# Patient Record
Sex: MALE | Race: WHITE | Employment: OTHER | URBAN - METROPOLITAN AREA
[De-identification: names, ages, dates, MRNs, and addresses within clinical notes are randomized per-mention and may not be internally consistent; named-entity substitution may affect disease eponyms.]

---

## 2017-01-03 RX ORDER — TRAMADOL HYDROCHLORIDE 50 MG/1
TABLET ORAL
Qty: 120 TABLET | Refills: 1 | Status: SHIPPED | OUTPATIENT
Start: 2017-01-03 | End: 2017-03-02 | Stop reason: SDUPTHER

## 2017-01-03 RX ORDER — ISOSORBIDE DINITRATE 10 MG/1
TABLET ORAL
Qty: 93 TABLET | Refills: 1 | Status: SHIPPED | OUTPATIENT
Start: 2017-01-03 | End: 2017-02-28 | Stop reason: SDUPTHER

## 2017-02-01 RX ORDER — TRIAMTERENE AND HYDROCHLOROTHIAZIDE 37.5; 25 MG/1; MG/1
CAPSULE ORAL
Qty: 31 CAPSULE | Refills: 0 | Status: SHIPPED | OUTPATIENT
Start: 2017-02-01 | End: 2017-04-04 | Stop reason: SDUPTHER

## 2017-02-01 RX ORDER — ROPINIROLE 1 MG/1
TABLET, FILM COATED ORAL
Qty: 31 TABLET | Refills: 0 | Status: SHIPPED | OUTPATIENT
Start: 2017-02-01 | End: 2017-04-04 | Stop reason: SDUPTHER

## 2017-02-01 RX ORDER — ATORVASTATIN CALCIUM 10 MG/1
TABLET, FILM COATED ORAL
Qty: 31 TABLET | Refills: 0 | Status: SHIPPED | OUTPATIENT
Start: 2017-02-01 | End: 2017-04-04 | Stop reason: SDUPTHER

## 2017-02-01 RX ORDER — GLIMEPIRIDE 2 MG/1
TABLET ORAL
Qty: 62 TABLET | Refills: 0 | Status: SHIPPED | OUTPATIENT
Start: 2017-02-01 | End: 2017-04-04 | Stop reason: SDUPTHER

## 2017-02-13 ENCOUNTER — OFFICE VISIT (OUTPATIENT)
Dept: INTERNAL MEDICINE CLINIC | Age: 82
End: 2017-02-13

## 2017-02-13 VITALS
DIASTOLIC BLOOD PRESSURE: 80 MMHG | RESPIRATION RATE: 14 BRPM | SYSTOLIC BLOOD PRESSURE: 120 MMHG | BODY MASS INDEX: 27.97 KG/M2 | HEART RATE: 70 BPM | HEIGHT: 66 IN | WEIGHT: 174 LBS

## 2017-02-13 DIAGNOSIS — E11.9 TYPE 2 DIABETES MELLITUS WITHOUT COMPLICATION, WITHOUT LONG-TERM CURRENT USE OF INSULIN (HCC): Primary | ICD-10-CM

## 2017-02-13 DIAGNOSIS — I25.10 CORONARY ARTERY DISEASE INVOLVING NATIVE HEART WITHOUT ANGINA PECTORIS, UNSPECIFIED VESSEL OR LESION TYPE: ICD-10-CM

## 2017-02-13 DIAGNOSIS — E11.69 HYPERLIPIDEMIA ASSOCIATED WITH TYPE 2 DIABETES MELLITUS (HCC): ICD-10-CM

## 2017-02-13 DIAGNOSIS — E78.5 HYPERLIPIDEMIA ASSOCIATED WITH TYPE 2 DIABETES MELLITUS (HCC): ICD-10-CM

## 2017-02-13 DIAGNOSIS — I10 ESSENTIAL HYPERTENSION: ICD-10-CM

## 2017-02-13 DIAGNOSIS — M15.9 PRIMARY OSTEOARTHRITIS INVOLVING MULTIPLE JOINTS: ICD-10-CM

## 2017-02-13 DIAGNOSIS — E11.9 CONTROLLED TYPE 2 DIABETES MELLITUS WITHOUT COMPLICATION, WITHOUT LONG-TERM CURRENT USE OF INSULIN (HCC): ICD-10-CM

## 2017-02-13 DIAGNOSIS — G25.81 RESTLESS LEGS SYNDROME: ICD-10-CM

## 2017-02-13 DIAGNOSIS — E11.9 TYPE 2 DIABETES MELLITUS WITHOUT COMPLICATION, WITHOUT LONG-TERM CURRENT USE OF INSULIN (HCC): ICD-10-CM

## 2017-02-13 DIAGNOSIS — N18.30 CHRONIC KIDNEY DISEASE (CKD), STAGE III (MODERATE) (HCC): ICD-10-CM

## 2017-02-13 DIAGNOSIS — N19 RENAL FAILURE: ICD-10-CM

## 2017-02-13 PROCEDURE — 99214 OFFICE O/P EST MOD 30 MIN: CPT | Performed by: INTERNAL MEDICINE

## 2017-02-13 PROCEDURE — 1036F TOBACCO NON-USER: CPT | Performed by: INTERNAL MEDICINE

## 2017-02-13 PROCEDURE — G8420 CALC BMI NORM PARAMETERS: HCPCS | Performed by: INTERNAL MEDICINE

## 2017-02-13 PROCEDURE — 1123F ACP DISCUSS/DSCN MKR DOCD: CPT | Performed by: INTERNAL MEDICINE

## 2017-02-13 PROCEDURE — G8484 FLU IMMUNIZE NO ADMIN: HCPCS | Performed by: INTERNAL MEDICINE

## 2017-02-13 PROCEDURE — G8427 DOCREV CUR MEDS BY ELIG CLIN: HCPCS | Performed by: INTERNAL MEDICINE

## 2017-02-13 PROCEDURE — G8599 NO ASA/ANTIPLAT THER USE RNG: HCPCS | Performed by: INTERNAL MEDICINE

## 2017-02-13 PROCEDURE — 4040F PNEUMOC VAC/ADMIN/RCVD: CPT | Performed by: INTERNAL MEDICINE

## 2017-02-13 ASSESSMENT — ENCOUNTER SYMPTOMS
NAUSEA: 0
SHORTNESS OF BREATH: 0
VOMITING: 0
RHINORRHEA: 0
WHEEZING: 0
ABDOMINAL PAIN: 0
BACK PAIN: 0

## 2017-02-14 LAB
A/G RATIO: 1.6 (ref 1.1–2.2)
ALBUMIN SERPL-MCNC: 3.9 G/DL (ref 3.4–5)
ALP BLD-CCNC: 103 U/L (ref 40–129)
ALT SERPL-CCNC: 13 U/L (ref 10–40)
ANION GAP SERPL CALCULATED.3IONS-SCNC: 15 MMOL/L (ref 3–16)
AST SERPL-CCNC: 26 U/L (ref 15–37)
BASOPHILS ABSOLUTE: 0 K/UL (ref 0–0.2)
BASOPHILS RELATIVE PERCENT: 0.5 %
BILIRUB SERPL-MCNC: 0.5 MG/DL (ref 0–1)
BUN BLDV-MCNC: 25 MG/DL (ref 7–20)
CALCIUM SERPL-MCNC: 8.9 MG/DL (ref 8.3–10.6)
CHLORIDE BLD-SCNC: 103 MMOL/L (ref 99–110)
CHOLESTEROL, TOTAL: 117 MG/DL (ref 0–199)
CO2: 26 MMOL/L (ref 21–32)
CREAT SERPL-MCNC: 1.5 MG/DL (ref 0.8–1.3)
EOSINOPHILS ABSOLUTE: 0.1 K/UL (ref 0–0.6)
EOSINOPHILS RELATIVE PERCENT: 0.8 %
ESTIMATED AVERAGE GLUCOSE: 145.6 MG/DL
GFR AFRICAN AMERICAN: 53
GFR NON-AFRICAN AMERICAN: 44
GLOBULIN: 2.5 G/DL
GLUCOSE BLD-MCNC: 99 MG/DL (ref 70–99)
HBA1C MFR BLD: 6.7 %
HCT VFR BLD CALC: 47 % (ref 40.5–52.5)
HDLC SERPL-MCNC: 34 MG/DL (ref 40–60)
HEMOGLOBIN: 15.5 G/DL (ref 13.5–17.5)
LDL CHOLESTEROL CALCULATED: 64 MG/DL
LYMPHOCYTES ABSOLUTE: 1.3 K/UL (ref 1–5.1)
LYMPHOCYTES RELATIVE PERCENT: 20.3 %
MCH RBC QN AUTO: 31.1 PG (ref 26–34)
MCHC RBC AUTO-ENTMCNC: 33.1 G/DL (ref 31–36)
MCV RBC AUTO: 94 FL (ref 80–100)
MONOCYTES ABSOLUTE: 0.6 K/UL (ref 0–1.3)
MONOCYTES RELATIVE PERCENT: 10 %
NEUTROPHILS ABSOLUTE: 4.2 K/UL (ref 1.7–7.7)
NEUTROPHILS RELATIVE PERCENT: 68.4 %
PDW BLD-RTO: 13.4 % (ref 12.4–15.4)
PLATELET # BLD: 199 K/UL (ref 135–450)
PMV BLD AUTO: 8.1 FL (ref 5–10.5)
POTASSIUM SERPL-SCNC: 4.3 MMOL/L (ref 3.5–5.1)
RBC # BLD: 4.99 M/UL (ref 4.2–5.9)
SODIUM BLD-SCNC: 144 MMOL/L (ref 136–145)
TOTAL PROTEIN: 6.4 G/DL (ref 6.4–8.2)
TRIGL SERPL-MCNC: 95 MG/DL (ref 0–150)
VLDLC SERPL CALC-MCNC: 19 MG/DL
WBC # BLD: 6.2 K/UL (ref 4–11)

## 2017-03-01 RX ORDER — OLOPATADINE HYDROCHLORIDE 1 MG/ML
SOLUTION/ DROPS OPHTHALMIC
Qty: 5 ML | Refills: 0 | Status: SHIPPED | OUTPATIENT
Start: 2017-03-01 | End: 2019-08-13

## 2017-03-01 RX ORDER — HYDRALAZINE HYDROCHLORIDE 25 MG/1
TABLET, FILM COATED ORAL
Qty: 93 TABLET | Refills: 0 | Status: SHIPPED | OUTPATIENT
Start: 2017-03-01 | End: 2017-05-03 | Stop reason: SDUPTHER

## 2017-03-01 RX ORDER — ATENOLOL 50 MG/1
TABLET ORAL
Qty: 31 TABLET | Refills: 2 | Status: SHIPPED | OUTPATIENT
Start: 2017-03-01 | End: 2017-07-03 | Stop reason: SDUPTHER

## 2017-03-01 RX ORDER — ISOSORBIDE DINITRATE 10 MG/1
TABLET ORAL
Qty: 93 TABLET | Refills: 0 | Status: SHIPPED | OUTPATIENT
Start: 2017-03-01 | End: 2017-04-04 | Stop reason: SDUPTHER

## 2017-03-02 RX ORDER — TRAMADOL HYDROCHLORIDE 50 MG/1
TABLET ORAL
Qty: 120 TABLET | Refills: 2 | Status: SHIPPED | OUTPATIENT
Start: 2017-03-02 | End: 2017-07-27 | Stop reason: SDUPTHER

## 2017-04-04 RX ORDER — ISOSORBIDE DINITRATE 10 MG/1
TABLET ORAL
Qty: 93 TABLET | Refills: 0 | Status: SHIPPED | OUTPATIENT
Start: 2017-04-04 | End: 2017-06-02 | Stop reason: SDUPTHER

## 2017-04-04 RX ORDER — ATORVASTATIN CALCIUM 10 MG/1
TABLET, FILM COATED ORAL
Qty: 31 TABLET | Refills: 0 | Status: SHIPPED | OUTPATIENT
Start: 2017-04-04 | End: 2017-06-02 | Stop reason: SDUPTHER

## 2017-04-04 RX ORDER — GLIMEPIRIDE 2 MG/1
TABLET ORAL
Qty: 62 TABLET | Refills: 0 | Status: SHIPPED | OUTPATIENT
Start: 2017-04-04 | End: 2017-06-02 | Stop reason: SDUPTHER

## 2017-04-04 RX ORDER — TRIAMTERENE AND HYDROCHLOROTHIAZIDE 37.5; 25 MG/1; MG/1
CAPSULE ORAL
Qty: 31 CAPSULE | Refills: 0 | Status: SHIPPED | OUTPATIENT
Start: 2017-04-04 | End: 2017-06-02 | Stop reason: SDUPTHER

## 2017-04-04 RX ORDER — ROPINIROLE 1 MG/1
TABLET, FILM COATED ORAL
Qty: 31 TABLET | Refills: 0 | Status: SHIPPED | OUTPATIENT
Start: 2017-04-04 | End: 2017-06-02 | Stop reason: SDUPTHER

## 2017-05-04 RX ORDER — HYDRALAZINE HYDROCHLORIDE 25 MG/1
TABLET, FILM COATED ORAL
Qty: 93 TABLET | Refills: 1 | Status: SHIPPED | OUTPATIENT
Start: 2017-05-04 | End: 2017-07-31 | Stop reason: SDUPTHER

## 2017-05-17 ENCOUNTER — OFFICE VISIT (OUTPATIENT)
Dept: INTERNAL MEDICINE CLINIC | Age: 82
End: 2017-05-17

## 2017-05-17 VITALS
SYSTOLIC BLOOD PRESSURE: 130 MMHG | HEIGHT: 65 IN | BODY MASS INDEX: 28.32 KG/M2 | WEIGHT: 170 LBS | RESPIRATION RATE: 14 BRPM | DIASTOLIC BLOOD PRESSURE: 80 MMHG | HEART RATE: 70 BPM

## 2017-05-17 DIAGNOSIS — E78.5 HYPERLIPIDEMIA ASSOCIATED WITH TYPE 2 DIABETES MELLITUS (HCC): ICD-10-CM

## 2017-05-17 DIAGNOSIS — Z23 NEED FOR PNEUMOCOCCAL VACCINATION: ICD-10-CM

## 2017-05-17 DIAGNOSIS — G25.81 RESTLESS LEGS SYNDROME: ICD-10-CM

## 2017-05-17 DIAGNOSIS — M15.9 PRIMARY OSTEOARTHRITIS INVOLVING MULTIPLE JOINTS: ICD-10-CM

## 2017-05-17 DIAGNOSIS — E11.9 TYPE 2 DIABETES MELLITUS WITHOUT COMPLICATION, WITHOUT LONG-TERM CURRENT USE OF INSULIN (HCC): Primary | ICD-10-CM

## 2017-05-17 DIAGNOSIS — I10 ESSENTIAL HYPERTENSION: ICD-10-CM

## 2017-05-17 DIAGNOSIS — I25.10 CORONARY ARTERY DISEASE INVOLVING NATIVE HEART WITHOUT ANGINA PECTORIS, UNSPECIFIED VESSEL OR LESION TYPE: ICD-10-CM

## 2017-05-17 DIAGNOSIS — N18.30 CHRONIC KIDNEY DISEASE (CKD), STAGE III (MODERATE) (HCC): ICD-10-CM

## 2017-05-17 DIAGNOSIS — E11.69 HYPERLIPIDEMIA ASSOCIATED WITH TYPE 2 DIABETES MELLITUS (HCC): ICD-10-CM

## 2017-05-17 LAB
BILIRUBIN, POC: NORMAL
BLOOD URINE, POC: NORMAL
CLARITY, POC: NORMAL
COLOR, POC: NORMAL
CREATININE URINE: 164.5 MG/DL (ref 39–259)
GLUCOSE URINE, POC: NORMAL
KETONES, POC: NORMAL
LEUKOCYTE EST, POC: NORMAL
MICROALBUMIN UR-MCNC: <1.2 MG/DL
MICROALBUMIN/CREAT UR-RTO: NORMAL MG/G (ref 0–30)
NITRITE, POC: NORMAL
PH, POC: NORMAL
PROTEIN, POC: NORMAL
SPECIFIC GRAVITY, POC: NORMAL
UROBILINOGEN, POC: NORMAL

## 2017-05-17 PROCEDURE — G8599 NO ASA/ANTIPLAT THER USE RNG: HCPCS | Performed by: INTERNAL MEDICINE

## 2017-05-17 PROCEDURE — G0009 ADMIN PNEUMOCOCCAL VACCINE: HCPCS | Performed by: INTERNAL MEDICINE

## 2017-05-17 PROCEDURE — 1036F TOBACCO NON-USER: CPT | Performed by: INTERNAL MEDICINE

## 2017-05-17 PROCEDURE — 99214 OFFICE O/P EST MOD 30 MIN: CPT | Performed by: INTERNAL MEDICINE

## 2017-05-17 PROCEDURE — G8427 DOCREV CUR MEDS BY ELIG CLIN: HCPCS | Performed by: INTERNAL MEDICINE

## 2017-05-17 PROCEDURE — 90732 PPSV23 VACC 2 YRS+ SUBQ/IM: CPT | Performed by: INTERNAL MEDICINE

## 2017-05-17 PROCEDURE — 4040F PNEUMOC VAC/ADMIN/RCVD: CPT | Performed by: INTERNAL MEDICINE

## 2017-05-17 PROCEDURE — 1123F ACP DISCUSS/DSCN MKR DOCD: CPT | Performed by: INTERNAL MEDICINE

## 2017-05-17 PROCEDURE — G8420 CALC BMI NORM PARAMETERS: HCPCS | Performed by: INTERNAL MEDICINE

## 2017-05-17 PROCEDURE — 81002 URINALYSIS NONAUTO W/O SCOPE: CPT | Performed by: INTERNAL MEDICINE

## 2017-05-17 RX ORDER — DEXTROMETHORPHAN HYDROBROMIDE AND PROMETHAZINE HYDROCHLORIDE 15; 6.25 MG/5ML; MG/5ML
5 SYRUP ORAL 4 TIMES DAILY PRN
Qty: 200 ML | Refills: 0 | Status: SHIPPED | OUTPATIENT
Start: 2017-05-17 | End: 2017-10-19 | Stop reason: SDUPTHER

## 2017-05-17 ASSESSMENT — ENCOUNTER SYMPTOMS
WHEEZING: 0
NAUSEA: 0
ABDOMINAL PAIN: 0
VOMITING: 0
RHINORRHEA: 0
BACK PAIN: 0
SHORTNESS OF BREATH: 0

## 2017-05-25 ENCOUNTER — TELEPHONE (OUTPATIENT)
Dept: ORTHOPEDIC SURGERY | Age: 82
End: 2017-05-25

## 2017-05-25 ENCOUNTER — OFFICE VISIT (OUTPATIENT)
Dept: ORTHOPEDIC SURGERY | Age: 82
End: 2017-05-25

## 2017-05-25 VITALS — HEIGHT: 65 IN | BODY MASS INDEX: 28.32 KG/M2 | WEIGHT: 169.97 LBS

## 2017-05-25 DIAGNOSIS — M25.561 CHRONIC PAIN OF RIGHT KNEE: ICD-10-CM

## 2017-05-25 DIAGNOSIS — G89.29 CHRONIC PAIN OF LEFT KNEE: ICD-10-CM

## 2017-05-25 DIAGNOSIS — M25.562 CHRONIC PAIN OF LEFT KNEE: ICD-10-CM

## 2017-05-25 DIAGNOSIS — G89.29 CHRONIC PAIN OF RIGHT KNEE: ICD-10-CM

## 2017-05-25 DIAGNOSIS — M17.0 PRIMARY OSTEOARTHRITIS OF BOTH KNEES: Primary | ICD-10-CM

## 2017-05-25 PROCEDURE — 20610 DRAIN/INJ JOINT/BURSA W/O US: CPT | Performed by: ORTHOPAEDIC SURGERY

## 2017-05-25 PROCEDURE — 99203 OFFICE O/P NEW LOW 30 MIN: CPT | Performed by: ORTHOPAEDIC SURGERY

## 2017-06-02 RX ORDER — ATORVASTATIN CALCIUM 10 MG/1
TABLET, FILM COATED ORAL
Qty: 31 TABLET | Refills: 0 | Status: SHIPPED | OUTPATIENT
Start: 2017-06-02 | End: 2017-06-02 | Stop reason: SDUPTHER

## 2017-06-02 RX ORDER — ROPINIROLE 1 MG/1
TABLET, FILM COATED ORAL
Qty: 31 TABLET | Refills: 0 | Status: SHIPPED | OUTPATIENT
Start: 2017-06-02 | End: 2017-06-02 | Stop reason: SDUPTHER

## 2017-06-02 RX ORDER — GLIMEPIRIDE 2 MG/1
TABLET ORAL
Qty: 62 TABLET | Refills: 0 | Status: SHIPPED | OUTPATIENT
Start: 2017-06-02 | End: 2017-07-31 | Stop reason: SDUPTHER

## 2017-06-02 RX ORDER — ATORVASTATIN CALCIUM 10 MG/1
TABLET, FILM COATED ORAL
Qty: 31 TABLET | Refills: 0 | Status: SHIPPED | OUTPATIENT
Start: 2017-06-02 | End: 2017-07-31 | Stop reason: SDUPTHER

## 2017-06-02 RX ORDER — TRIAMTERENE AND HYDROCHLOROTHIAZIDE 37.5; 25 MG/1; MG/1
CAPSULE ORAL
Qty: 31 CAPSULE | Refills: 0 | Status: SHIPPED | OUTPATIENT
Start: 2017-06-02 | End: 2017-06-02 | Stop reason: SDUPTHER

## 2017-06-02 RX ORDER — ROPINIROLE 1 MG/1
TABLET, FILM COATED ORAL
Qty: 31 TABLET | Refills: 0 | Status: SHIPPED | OUTPATIENT
Start: 2017-06-02 | End: 2017-10-13 | Stop reason: SDUPTHER

## 2017-06-02 RX ORDER — ISOSORBIDE DINITRATE 10 MG/1
TABLET ORAL
Qty: 93 TABLET | Refills: 0 | Status: SHIPPED | OUTPATIENT
Start: 2017-06-02 | End: 2017-07-31 | Stop reason: SDUPTHER

## 2017-06-02 RX ORDER — TRIAMTERENE AND HYDROCHLOROTHIAZIDE 37.5; 25 MG/1; MG/1
CAPSULE ORAL
Qty: 31 CAPSULE | Refills: 0 | Status: SHIPPED | OUTPATIENT
Start: 2017-06-02 | End: 2017-09-01 | Stop reason: SDUPTHER

## 2017-06-08 ENCOUNTER — OFFICE VISIT (OUTPATIENT)
Dept: ORTHOPEDIC SURGERY | Age: 82
End: 2017-06-08

## 2017-06-08 DIAGNOSIS — M17.0 PRIMARY OSTEOARTHRITIS OF BOTH KNEES: Primary | ICD-10-CM

## 2017-06-08 PROCEDURE — 1036F TOBACCO NON-USER: CPT | Performed by: ORTHOPAEDIC SURGERY

## 2017-06-08 PROCEDURE — 20610 DRAIN/INJ JOINT/BURSA W/O US: CPT | Performed by: ORTHOPAEDIC SURGERY

## 2017-07-05 RX ORDER — ATENOLOL 50 MG/1
TABLET ORAL
Qty: 31 TABLET | Refills: 2 | Status: SHIPPED | OUTPATIENT
Start: 2017-07-05 | End: 2017-09-01 | Stop reason: SDUPTHER

## 2017-07-27 RX ORDER — TRAMADOL HYDROCHLORIDE 50 MG/1
TABLET ORAL
Qty: 120 TABLET | Refills: 2 | Status: SHIPPED | OUTPATIENT
Start: 2017-07-27 | End: 2018-01-10 | Stop reason: SDUPTHER

## 2017-08-01 RX ORDER — ATORVASTATIN CALCIUM 10 MG/1
TABLET, FILM COATED ORAL
Qty: 31 TABLET | Refills: 0 | Status: SHIPPED | OUTPATIENT
Start: 2017-08-01 | End: 2017-10-02 | Stop reason: SDUPTHER

## 2017-08-01 RX ORDER — ISOSORBIDE DINITRATE 10 MG/1
TABLET ORAL
Qty: 93 TABLET | Refills: 0 | Status: SHIPPED | OUTPATIENT
Start: 2017-08-01 | End: 2017-10-02 | Stop reason: SDUPTHER

## 2017-08-01 RX ORDER — HYDRALAZINE HYDROCHLORIDE 25 MG/1
TABLET, FILM COATED ORAL
Qty: 93 TABLET | Refills: 0 | Status: SHIPPED | OUTPATIENT
Start: 2017-08-01 | End: 2017-10-02 | Stop reason: SDUPTHER

## 2017-08-01 RX ORDER — GLIMEPIRIDE 2 MG/1
TABLET ORAL
Qty: 62 TABLET | Refills: 0 | Status: SHIPPED | OUTPATIENT
Start: 2017-08-01 | End: 2017-10-02 | Stop reason: SDUPTHER

## 2017-08-23 ENCOUNTER — OFFICE VISIT (OUTPATIENT)
Dept: INTERNAL MEDICINE CLINIC | Age: 82
End: 2017-08-23

## 2017-08-23 VITALS
SYSTOLIC BLOOD PRESSURE: 120 MMHG | HEIGHT: 65 IN | HEART RATE: 72 BPM | BODY MASS INDEX: 28.32 KG/M2 | DIASTOLIC BLOOD PRESSURE: 70 MMHG | WEIGHT: 170 LBS | RESPIRATION RATE: 14 BRPM

## 2017-08-23 DIAGNOSIS — N19 RENAL FAILURE: ICD-10-CM

## 2017-08-23 DIAGNOSIS — M15.9 PRIMARY OSTEOARTHRITIS INVOLVING MULTIPLE JOINTS: ICD-10-CM

## 2017-08-23 DIAGNOSIS — I25.10 CORONARY ARTERY DISEASE INVOLVING NATIVE HEART WITHOUT ANGINA PECTORIS, UNSPECIFIED VESSEL OR LESION TYPE: ICD-10-CM

## 2017-08-23 DIAGNOSIS — G25.81 RESTLESS LEGS SYNDROME: ICD-10-CM

## 2017-08-23 DIAGNOSIS — E11.9 CONTROLLED TYPE 2 DIABETES MELLITUS WITHOUT COMPLICATION, WITHOUT LONG-TERM CURRENT USE OF INSULIN (HCC): ICD-10-CM

## 2017-08-23 DIAGNOSIS — N18.30 CHRONIC KIDNEY DISEASE (CKD), STAGE III (MODERATE) (HCC): ICD-10-CM

## 2017-08-23 DIAGNOSIS — I10 ESSENTIAL HYPERTENSION: ICD-10-CM

## 2017-08-23 DIAGNOSIS — E78.5 HYPERLIPIDEMIA ASSOCIATED WITH TYPE 2 DIABETES MELLITUS (HCC): ICD-10-CM

## 2017-08-23 DIAGNOSIS — E11.9 CONTROLLED TYPE 2 DIABETES MELLITUS WITHOUT COMPLICATION, WITHOUT LONG-TERM CURRENT USE OF INSULIN (HCC): Primary | ICD-10-CM

## 2017-08-23 DIAGNOSIS — E11.69 HYPERLIPIDEMIA ASSOCIATED WITH TYPE 2 DIABETES MELLITUS (HCC): ICD-10-CM

## 2017-08-23 LAB
A/G RATIO: 1.4 (ref 1.1–2.2)
ALBUMIN SERPL-MCNC: 3.7 G/DL (ref 3.4–5)
ALP BLD-CCNC: 86 U/L (ref 40–129)
ALT SERPL-CCNC: 12 U/L (ref 10–40)
ANION GAP SERPL CALCULATED.3IONS-SCNC: 13 MMOL/L (ref 3–16)
AST SERPL-CCNC: 22 U/L (ref 15–37)
BASOPHILS ABSOLUTE: 0 K/UL (ref 0–0.2)
BASOPHILS RELATIVE PERCENT: 0.6 %
BILIRUB SERPL-MCNC: 0.7 MG/DL (ref 0–1)
BUN BLDV-MCNC: 23 MG/DL (ref 7–20)
CALCIUM SERPL-MCNC: 9.3 MG/DL (ref 8.3–10.6)
CHLORIDE BLD-SCNC: 99 MMOL/L (ref 99–110)
CO2: 25 MMOL/L (ref 21–32)
CREAT SERPL-MCNC: 1.3 MG/DL (ref 0.8–1.3)
EOSINOPHILS ABSOLUTE: 0.1 K/UL (ref 0–0.6)
EOSINOPHILS RELATIVE PERCENT: 1.4 %
GFR AFRICAN AMERICAN: >60
GFR NON-AFRICAN AMERICAN: 52
GLOBULIN: 2.6 G/DL
GLUCOSE BLD-MCNC: 90 MG/DL (ref 70–99)
HCT VFR BLD CALC: 46.2 % (ref 40.5–52.5)
HEMOGLOBIN: 15.3 G/DL (ref 13.5–17.5)
LYMPHOCYTES ABSOLUTE: 1.5 K/UL (ref 1–5.1)
LYMPHOCYTES RELATIVE PERCENT: 25.7 %
MCH RBC QN AUTO: 31.9 PG (ref 26–34)
MCHC RBC AUTO-ENTMCNC: 33.1 G/DL (ref 31–36)
MCV RBC AUTO: 96.4 FL (ref 80–100)
MONOCYTES ABSOLUTE: 0.7 K/UL (ref 0–1.3)
MONOCYTES RELATIVE PERCENT: 12.2 %
NEUTROPHILS ABSOLUTE: 3.6 K/UL (ref 1.7–7.7)
NEUTROPHILS RELATIVE PERCENT: 60.1 %
PDW BLD-RTO: 13.7 % (ref 12.4–15.4)
PLATELET # BLD: 192 K/UL (ref 135–450)
PMV BLD AUTO: 8.1 FL (ref 5–10.5)
POTASSIUM SERPL-SCNC: 4.3 MMOL/L (ref 3.5–5.1)
RBC # BLD: 4.8 M/UL (ref 4.2–5.9)
SODIUM BLD-SCNC: 137 MMOL/L (ref 136–145)
TOTAL PROTEIN: 6.3 G/DL (ref 6.4–8.2)
WBC # BLD: 6 K/UL (ref 4–11)

## 2017-08-23 PROCEDURE — G8419 CALC BMI OUT NRM PARAM NOF/U: HCPCS | Performed by: INTERNAL MEDICINE

## 2017-08-23 PROCEDURE — G8599 NO ASA/ANTIPLAT THER USE RNG: HCPCS | Performed by: INTERNAL MEDICINE

## 2017-08-23 PROCEDURE — 1036F TOBACCO NON-USER: CPT | Performed by: INTERNAL MEDICINE

## 2017-08-23 PROCEDURE — 4040F PNEUMOC VAC/ADMIN/RCVD: CPT | Performed by: INTERNAL MEDICINE

## 2017-08-23 PROCEDURE — 99214 OFFICE O/P EST MOD 30 MIN: CPT | Performed by: INTERNAL MEDICINE

## 2017-08-23 PROCEDURE — G8427 DOCREV CUR MEDS BY ELIG CLIN: HCPCS | Performed by: INTERNAL MEDICINE

## 2017-08-23 PROCEDURE — 1123F ACP DISCUSS/DSCN MKR DOCD: CPT | Performed by: INTERNAL MEDICINE

## 2017-08-23 ASSESSMENT — ENCOUNTER SYMPTOMS
RHINORRHEA: 0
BACK PAIN: 0
VOMITING: 0
WHEEZING: 0
ABDOMINAL PAIN: 0
SHORTNESS OF BREATH: 0
NAUSEA: 0

## 2017-08-24 LAB
ESTIMATED AVERAGE GLUCOSE: 134.1 MG/DL
HBA1C MFR BLD: 6.3 %

## 2017-09-05 RX ORDER — TRIAMTERENE AND HYDROCHLOROTHIAZIDE 37.5; 25 MG/1; MG/1
CAPSULE ORAL
Qty: 31 CAPSULE | Refills: 2 | Status: SHIPPED | OUTPATIENT
Start: 2017-09-05 | End: 2017-12-27 | Stop reason: SDUPTHER

## 2017-09-05 RX ORDER — ATENOLOL 50 MG/1
TABLET ORAL
Qty: 31 TABLET | Refills: 2 | Status: SHIPPED | OUTPATIENT
Start: 2017-09-05 | End: 2017-12-27 | Stop reason: SDUPTHER

## 2017-09-15 ENCOUNTER — TELEPHONE (OUTPATIENT)
Dept: INTERNAL MEDICINE CLINIC | Age: 82
End: 2017-09-15

## 2017-09-15 RX ORDER — GUAIFENESIN 100 MG/5ML
10 SYRUP ORAL 3 TIMES DAILY PRN
Qty: 150 ML | Refills: 0 | Status: SHIPPED | OUTPATIENT
Start: 2017-09-15 | End: 2018-02-28

## 2017-10-02 RX ORDER — GLIMEPIRIDE 2 MG/1
TABLET ORAL
Qty: 62 TABLET | Refills: 1 | Status: SHIPPED | OUTPATIENT
Start: 2017-10-02 | End: 2017-12-27 | Stop reason: SDUPTHER

## 2017-10-02 RX ORDER — ISOSORBIDE DINITRATE 10 MG/1
TABLET ORAL
Qty: 93 TABLET | Refills: 1 | Status: SHIPPED | OUTPATIENT
Start: 2017-10-02 | End: 2017-12-27 | Stop reason: SDUPTHER

## 2017-10-02 RX ORDER — ATORVASTATIN CALCIUM 10 MG/1
TABLET, FILM COATED ORAL
Qty: 31 TABLET | Refills: 1 | Status: SHIPPED | OUTPATIENT
Start: 2017-10-02 | End: 2017-12-27 | Stop reason: SDUPTHER

## 2017-10-02 RX ORDER — HYDRALAZINE HYDROCHLORIDE 25 MG/1
TABLET, FILM COATED ORAL
Qty: 93 TABLET | Refills: 1 | Status: SHIPPED | OUTPATIENT
Start: 2017-10-02 | End: 2017-12-27 | Stop reason: SDUPTHER

## 2017-10-13 RX ORDER — ROPINIROLE 1 MG/1
TABLET, FILM COATED ORAL
Qty: 31 TABLET | Refills: 0 | Status: SHIPPED | OUTPATIENT
Start: 2017-10-13 | End: 2017-11-29 | Stop reason: SDUPTHER

## 2017-10-19 RX ORDER — DEXTROMETHORPHAN HYDROBROMIDE AND PROMETHAZINE HYDROCHLORIDE 15; 6.25 MG/5ML; MG/5ML
5 SYRUP ORAL 4 TIMES DAILY PRN
Qty: 160 ML | Refills: 0 | Status: SHIPPED | OUTPATIENT
Start: 2017-10-19 | End: 2017-10-19 | Stop reason: CLARIF

## 2017-10-20 ENCOUNTER — TELEPHONE (OUTPATIENT)
Dept: INTERNAL MEDICINE CLINIC | Age: 82
End: 2017-10-20

## 2017-10-20 RX ORDER — DEXTROMETHORPHAN POLISTIREX 30 MG/5ML
30 SUSPENSION ORAL 2 TIMES DAILY PRN
Qty: 1 BOTTLE | Refills: 2 | Status: SHIPPED | OUTPATIENT
Start: 2017-10-20 | End: 2017-10-30

## 2017-10-20 NOTE — TELEPHONE ENCOUNTER
----- Message from Jean Paul Carvajal MD sent at 10/20/2017 11:46 AM EDT -----  Contact: pt daughter, Benny Max [phone]  Already done  ----- Message -----  From: Amadarobles Hood  Sent: 10/20/2017  10:44 AM  To: Jean Paul Carvajal MD    Pt daughter said that the pt started coughing yesterday and wants to know if you will send in some sort of cough medicine for him please?       Batsheva/amador    Last visit: 8-23-17  Future visit: 11-22-17

## 2017-11-22 ENCOUNTER — OFFICE VISIT (OUTPATIENT)
Dept: INTERNAL MEDICINE CLINIC | Age: 82
End: 2017-11-22

## 2017-11-22 VITALS
SYSTOLIC BLOOD PRESSURE: 130 MMHG | BODY MASS INDEX: 28.49 KG/M2 | HEART RATE: 80 BPM | WEIGHT: 171 LBS | HEIGHT: 65 IN | DIASTOLIC BLOOD PRESSURE: 80 MMHG | RESPIRATION RATE: 14 BRPM

## 2017-11-22 DIAGNOSIS — I25.10 CORONARY ARTERY DISEASE INVOLVING NATIVE HEART WITHOUT ANGINA PECTORIS, UNSPECIFIED VESSEL OR LESION TYPE: ICD-10-CM

## 2017-11-22 DIAGNOSIS — G25.81 RESTLESS LEGS SYNDROME: ICD-10-CM

## 2017-11-22 DIAGNOSIS — E11.69 HYPERLIPIDEMIA ASSOCIATED WITH TYPE 2 DIABETES MELLITUS (HCC): ICD-10-CM

## 2017-11-22 DIAGNOSIS — E78.5 HYPERLIPIDEMIA ASSOCIATED WITH TYPE 2 DIABETES MELLITUS (HCC): ICD-10-CM

## 2017-11-22 DIAGNOSIS — N18.30 CHRONIC KIDNEY DISEASE (CKD), STAGE III (MODERATE) (HCC): ICD-10-CM

## 2017-11-22 DIAGNOSIS — E11.9 TYPE 2 DIABETES MELLITUS WITHOUT COMPLICATION, WITHOUT LONG-TERM CURRENT USE OF INSULIN (HCC): Primary | ICD-10-CM

## 2017-11-22 DIAGNOSIS — I10 ESSENTIAL HYPERTENSION: ICD-10-CM

## 2017-11-22 DIAGNOSIS — M17.0 PRIMARY OSTEOARTHRITIS OF BOTH KNEES: ICD-10-CM

## 2017-11-22 PROCEDURE — G8599 NO ASA/ANTIPLAT THER USE RNG: HCPCS | Performed by: INTERNAL MEDICINE

## 2017-11-22 PROCEDURE — G8417 CALC BMI ABV UP PARAM F/U: HCPCS | Performed by: INTERNAL MEDICINE

## 2017-11-22 PROCEDURE — 1036F TOBACCO NON-USER: CPT | Performed by: INTERNAL MEDICINE

## 2017-11-22 PROCEDURE — G8484 FLU IMMUNIZE NO ADMIN: HCPCS | Performed by: INTERNAL MEDICINE

## 2017-11-22 PROCEDURE — 99214 OFFICE O/P EST MOD 30 MIN: CPT | Performed by: INTERNAL MEDICINE

## 2017-11-22 PROCEDURE — 1123F ACP DISCUSS/DSCN MKR DOCD: CPT | Performed by: INTERNAL MEDICINE

## 2017-11-22 PROCEDURE — G8428 CUR MEDS NOT DOCUMENT: HCPCS | Performed by: INTERNAL MEDICINE

## 2017-11-22 PROCEDURE — 4040F PNEUMOC VAC/ADMIN/RCVD: CPT | Performed by: INTERNAL MEDICINE

## 2017-11-22 ASSESSMENT — PATIENT HEALTH QUESTIONNAIRE - PHQ9
1. LITTLE INTEREST OR PLEASURE IN DOING THINGS: 0
SUM OF ALL RESPONSES TO PHQ QUESTIONS 1-9: 0
SUM OF ALL RESPONSES TO PHQ9 QUESTIONS 1 & 2: 0
2. FEELING DOWN, DEPRESSED OR HOPELESS: 0

## 2017-11-22 ASSESSMENT — ENCOUNTER SYMPTOMS
RHINORRHEA: 0
SHORTNESS OF BREATH: 0
BACK PAIN: 0
WHEEZING: 0
NAUSEA: 0
VOMITING: 0
ABDOMINAL PAIN: 0

## 2017-11-22 NOTE — PROGRESS NOTES
Subjective:      Patient ID: Angel Carrillo is a 80 y.o. male. HPI    Patient is here for follow up of CAD, diabetes, hypertension, hyperlipidemia, OA, renal failure and restless legs syndrome. Patient presents for routine coronary artery disease follow-up. CAD- Current symptoms: non-existent Pain radiation: none Patient also complains of no other symptoms. Doing well. Patient's does not check his sugars but his DM is controlled. Patient does not have polydipsia and polyuria. No numbness or tingling. Last A1C was 6.3. Patient's BP is controlled on current medications. No chest pain or dyspnea. His OA is progressive. He uses a wheelchair. His restless legs is stable. Current Outpatient Prescriptions   Medication Sig Dispense Refill    rOPINIRole (REQUIP) 1 MG tablet TAKE ONE TABLET AT BEDTIME 31 tablet 0    isosorbide dinitrate (ISORDIL) 10 MG tablet TAKE 1 TABLET THREE TIMES DAILY 93 tablet 1    glimepiride (AMARYL) 2 MG tablet TAKE 1 TABLET TWICE DAILY 62 tablet 1    atorvastatin (LIPITOR) 10 MG tablet TAKE ONE TABLET AT BEDTIME 31 tablet 1    hydrALAZINE (APRESOLINE) 25 MG tablet TAKE 1 TABLET THREE TIMES DAILY 93 tablet 1    guaiFENesin (ALTARUSSIN) 100 MG/5ML syrup Take 10 mLs by mouth 3 times daily as needed for Cough 150 mL 0    triamterene-hydrochlorothiazide (DYAZIDE) 37.5-25 MG per capsule TAKE 1 CAPSULE ONCE DAILY 31 capsule 2    atenolol (TENORMIN) 50 MG tablet TAKE (1) TABLET DAILY 31 tablet 2    traMADol (ULTRAM) 50 MG tablet TAKE 1 TABLET EVERY 6 HOURS AS NEEDED 120 tablet 2    olopatadine (PATANOL) 0.1 % ophthalmic solution PLACE 1 DROP INTO EACH EYE TWICE DAILY 5 mL 0    Misc. Devices (MARIA ISABEL ROLLING WALKER ELITE) MISC Use for ambulation 1 each 0    aspirin 81 MG tablet Take 1 tablet by mouth daily. 30 tablet      No current facility-administered medications for this visit. Review of Systems   Constitutional: Negative for activity change and appetite change. Judgment and thought content normal.       Assessment:      1. Type 2 diabetes mellitus without complication, without long-term current use of insulin ([facility] [address]     2. Coronary artery disease involving native heart without angina pectoris, unspecified vessel or lesion type     3. Essential hypertension     4. Restless legs syndrome     5. Hyperlipidemia associated with type 2 diabetes mellitus ([facility] [address]     6. Chronic kidney disease (CKD), stage III (moderate)     7. Primary osteoarthritis of both knees             Plan:       # DM type 2: well controlled. No issues. Doing well. # CAD: no chest pain. No issues. #  Hypertension: is well controlled. #  CKD: stable. It is stage 3. #  Allergies:  Uses Patanol drops. #  OA: It is progressive. Pain controlled. #  Hyperlipidemia: at goal.        Decrease calorie intake. Exercise,weight loss recommended. The current medical regimen is effective;  continue present plan and medications. See orders.

## 2017-11-30 RX ORDER — ROPINIROLE 1 MG/1
TABLET, FILM COATED ORAL
Qty: 31 TABLET | Refills: 3 | Status: SHIPPED | OUTPATIENT
Start: 2017-11-30 | End: 2017-12-01 | Stop reason: SDUPTHER

## 2017-12-01 RX ORDER — ROPINIROLE 1 MG/1
TABLET, FILM COATED ORAL
Qty: 31 TABLET | Refills: 0 | Status: SHIPPED | OUTPATIENT
Start: 2017-12-01 | End: 2018-01-31 | Stop reason: SDUPTHER

## 2017-12-28 RX ORDER — ATORVASTATIN CALCIUM 10 MG/1
TABLET, FILM COATED ORAL
Qty: 31 TABLET | Refills: 1 | Status: SHIPPED | OUTPATIENT
Start: 2017-12-28 | End: 2018-03-01 | Stop reason: SDUPTHER

## 2017-12-28 RX ORDER — GLIMEPIRIDE 2 MG/1
TABLET ORAL
Qty: 62 TABLET | Refills: 1 | Status: SHIPPED | OUTPATIENT
Start: 2017-12-28 | End: 2018-03-01 | Stop reason: SDUPTHER

## 2017-12-28 RX ORDER — HYDRALAZINE HYDROCHLORIDE 25 MG/1
TABLET, FILM COATED ORAL
Qty: 93 TABLET | Refills: 1 | Status: SHIPPED | OUTPATIENT
Start: 2017-12-28 | End: 2018-03-01 | Stop reason: SDUPTHER

## 2017-12-28 RX ORDER — TRIAMTERENE AND HYDROCHLOROTHIAZIDE 37.5; 25 MG/1; MG/1
CAPSULE ORAL
Qty: 31 CAPSULE | Refills: 1 | Status: SHIPPED | OUTPATIENT
Start: 2017-12-28 | End: 2018-03-01 | Stop reason: SDUPTHER

## 2017-12-28 RX ORDER — ISOSORBIDE DINITRATE 10 MG/1
TABLET ORAL
Qty: 93 TABLET | Refills: 1 | Status: SHIPPED | OUTPATIENT
Start: 2017-12-28 | End: 2018-03-01 | Stop reason: SDUPTHER

## 2017-12-28 RX ORDER — ATENOLOL 50 MG/1
TABLET ORAL
Qty: 31 TABLET | Refills: 1 | Status: SHIPPED | OUTPATIENT
Start: 2017-12-28 | End: 2018-03-01 | Stop reason: SDUPTHER

## 2018-01-10 DIAGNOSIS — M15.9 PRIMARY OSTEOARTHRITIS INVOLVING MULTIPLE JOINTS: Primary | ICD-10-CM

## 2018-01-12 RX ORDER — TRAMADOL HYDROCHLORIDE 50 MG/1
TABLET ORAL
Qty: 120 TABLET | Refills: 0 | Status: SHIPPED | OUTPATIENT
Start: 2018-01-12 | End: 2018-02-10 | Stop reason: SDUPTHER

## 2018-01-31 RX ORDER — ROPINIROLE 1 MG/1
TABLET, FILM COATED ORAL
Qty: 31 TABLET | Refills: 0 | Status: SHIPPED | OUTPATIENT
Start: 2018-01-31 | End: 2018-03-01 | Stop reason: SDUPTHER

## 2018-02-10 DIAGNOSIS — M15.9 PRIMARY OSTEOARTHRITIS INVOLVING MULTIPLE JOINTS: ICD-10-CM

## 2018-02-12 RX ORDER — TRAMADOL HYDROCHLORIDE 50 MG/1
TABLET ORAL
Qty: 120 TABLET | Refills: 0 | Status: SHIPPED | OUTPATIENT
Start: 2018-02-12 | End: 2018-03-14 | Stop reason: SDUPTHER

## 2018-02-28 ENCOUNTER — OFFICE VISIT (OUTPATIENT)
Dept: INTERNAL MEDICINE CLINIC | Age: 83
End: 2018-02-28

## 2018-02-28 VITALS
BODY MASS INDEX: 28.32 KG/M2 | SYSTOLIC BLOOD PRESSURE: 130 MMHG | WEIGHT: 170 LBS | HEIGHT: 65 IN | RESPIRATION RATE: 14 BRPM | DIASTOLIC BLOOD PRESSURE: 80 MMHG | HEART RATE: 60 BPM

## 2018-02-28 DIAGNOSIS — E11.9 TYPE 2 DIABETES MELLITUS WITHOUT COMPLICATION, WITHOUT LONG-TERM CURRENT USE OF INSULIN (HCC): ICD-10-CM

## 2018-02-28 DIAGNOSIS — E11.9 TYPE 2 DIABETES MELLITUS WITHOUT COMPLICATION, WITHOUT LONG-TERM CURRENT USE OF INSULIN (HCC): Primary | ICD-10-CM

## 2018-02-28 DIAGNOSIS — I10 ESSENTIAL HYPERTENSION: ICD-10-CM

## 2018-02-28 DIAGNOSIS — Z00.00 ROUTINE GENERAL MEDICAL EXAMINATION AT A HEALTH CARE FACILITY: ICD-10-CM

## 2018-02-28 DIAGNOSIS — Z71.89 ACP (ADVANCE CARE PLANNING): ICD-10-CM

## 2018-02-28 LAB
A/G RATIO: 1.6 (ref 1.1–2.2)
ALBUMIN SERPL-MCNC: 3.9 G/DL (ref 3.4–5)
ALP BLD-CCNC: 87 U/L (ref 40–129)
ALT SERPL-CCNC: 12 U/L (ref 10–40)
ANION GAP SERPL CALCULATED.3IONS-SCNC: 11 MMOL/L (ref 3–16)
AST SERPL-CCNC: 22 U/L (ref 15–37)
BASOPHILS ABSOLUTE: 0 K/UL (ref 0–0.2)
BASOPHILS RELATIVE PERCENT: 0.7 %
BILIRUB SERPL-MCNC: 0.4 MG/DL (ref 0–1)
BUN BLDV-MCNC: 20 MG/DL (ref 7–20)
CALCIUM SERPL-MCNC: 8.6 MG/DL (ref 8.3–10.6)
CHLORIDE BLD-SCNC: 101 MMOL/L (ref 99–110)
CHOLESTEROL, TOTAL: 105 MG/DL (ref 0–199)
CO2: 28 MMOL/L (ref 21–32)
CREAT SERPL-MCNC: 1.3 MG/DL (ref 0.8–1.3)
EOSINOPHILS ABSOLUTE: 0 K/UL (ref 0–0.6)
EOSINOPHILS RELATIVE PERCENT: 0.5 %
GFR AFRICAN AMERICAN: >60
GFR NON-AFRICAN AMERICAN: 51
GLOBULIN: 2.4 G/DL
GLUCOSE BLD-MCNC: 180 MG/DL (ref 70–99)
HCT VFR BLD CALC: 35.7 % (ref 40.5–52.5)
HDLC SERPL-MCNC: 36 MG/DL (ref 40–60)
HEMOGLOBIN: 12.2 G/DL (ref 13.5–17.5)
LDL CHOLESTEROL CALCULATED: 45 MG/DL
LYMPHOCYTES ABSOLUTE: 1.2 K/UL (ref 1–5.1)
LYMPHOCYTES RELATIVE PERCENT: 18.2 %
MCH RBC QN AUTO: 29.9 PG (ref 26–34)
MCHC RBC AUTO-ENTMCNC: 34.2 G/DL (ref 31–36)
MCV RBC AUTO: 87.5 FL (ref 80–100)
MONOCYTES ABSOLUTE: 0.5 K/UL (ref 0–1.3)
MONOCYTES RELATIVE PERCENT: 8 %
NEUTROPHILS ABSOLUTE: 4.7 K/UL (ref 1.7–7.7)
NEUTROPHILS RELATIVE PERCENT: 72.6 %
PDW BLD-RTO: 14 % (ref 12.4–15.4)
PLATELET # BLD: 192 K/UL (ref 135–450)
PMV BLD AUTO: 8 FL (ref 5–10.5)
POTASSIUM SERPL-SCNC: 4.3 MMOL/L (ref 3.5–5.1)
RBC # BLD: 4.08 M/UL (ref 4.2–5.9)
SODIUM BLD-SCNC: 140 MMOL/L (ref 136–145)
TOTAL PROTEIN: 6.3 G/DL (ref 6.4–8.2)
TRIGL SERPL-MCNC: 121 MG/DL (ref 0–150)
VLDLC SERPL CALC-MCNC: 24 MG/DL
WBC # BLD: 6.5 K/UL (ref 4–11)

## 2018-02-28 PROCEDURE — G0439 PPPS, SUBSEQ VISIT: HCPCS | Performed by: INTERNAL MEDICINE

## 2018-02-28 PROCEDURE — 99497 ADVNCD CARE PLAN 30 MIN: CPT | Performed by: INTERNAL MEDICINE

## 2018-02-28 PROCEDURE — G8599 NO ASA/ANTIPLAT THER USE RNG: HCPCS | Performed by: INTERNAL MEDICINE

## 2018-02-28 ASSESSMENT — ANXIETY QUESTIONNAIRES: GAD7 TOTAL SCORE: 0

## 2018-02-28 ASSESSMENT — PATIENT HEALTH QUESTIONNAIRE - PHQ9: SUM OF ALL RESPONSES TO PHQ QUESTIONS 1-9: 0

## 2018-02-28 ASSESSMENT — LIFESTYLE VARIABLES: HOW OFTEN DO YOU HAVE A DRINK CONTAINING ALCOHOL: 0

## 2018-02-28 NOTE — PATIENT INSTRUCTIONS
Advance Directives: Care Instructions  Your Care Instructions  An advance directive is a legal way to state your wishes at the end of your life. It tells your family and your doctor what to do if you can no longer say what you want. There are two main types of advance directives. You can change them any time that your wishes change. · A living will tells your family and your doctor your wishes about life support and other treatment. · A durable power of  for health care lets you name a person to make treatment decisions for you when you can't speak for yourself. This person is called a health care agent. If you do not have an advance directive, decisions about your medical care may be made by a doctor or a  who doesn't know you. It may help to think of an advance directive as a gift to the people who care for you. If you have one, they won't have to make tough decisions by themselves. Follow-up care is a key part of your treatment and safety. Be sure to make and go to all appointments, and call your doctor if you are having problems. It's also a good idea to know your test results and keep a list of the medicines you take. How can you care for yourself at home? · Discuss your wishes with your loved ones and your doctor. This way, there are no surprises. · Many states have a unique form. Or you might use a universal form that has been approved by many states. This kind of form can sometimes be completed and stored online. Your electronic copy will then be available wherever you have a connection to the Internet. In most cases, doctors will respect your wishes even if you have a form from a different state. · You don't need a  to do an advance directive. But you may want to get legal advice. · Think about these questions when you prepare an advance directive:  ¨ Who do you want to make decisions about your medical care if you are not able to?  Many people choose a family member or can't breathe on your own, your heart stops, or you're unable to swallow. · What things would you still want to be able to do after you receive life-support methods? Would you want to be able to walk? To speak? To eat on your own? To live without the help of machines? · If you have a choice, where do you want to be cared for? In your home? At a hospital or nursing home? · Do you want certain [facility] practices performed if you become very ill? · If you have a choice at the end of your life, where would you prefer to die? At home? In a hospital or nursing home? Somewhere else? · Would you prefer to be buried or cremated? · Do you want your organs to be donated after you die? What should you do with your living will? · Make sure that your family members and your health care agent have copies of your living will. · Give your doctor a copy of your living will to keep in your medical record. If you have more than one doctor, make sure that each one has a copy. · You may want to put a copy of your living will where it can be easily found. Where can you learn more? Go to https://[employer]. org and sign in to your [employer]. account. Enter M627 in the [employer] box to learn more about \"Learning About Living [place]. \"     If you do not have an account, please click on the \"Sign Up Now\" link. Current as of: September 24, 2016  Content Version: 11.5  © 0187-2477 Healthwise, Incorporated. Care instructions adapted under license by [facility] ([facility]). If you have questions about a medical condition or this instruction, always ask your healthcare professional. [place] [address] any warranty or liability for your use of this information. Personalized Preventive Plan for [facility] - 2/28/2018  Medicare offers a range of preventive health benefits.  Some of the tests and screenings are paid in full while other may be subject to a deductible, co-insurance, and/or vision)especially medicines like prescription pain medicines and drugs used to treat mental health conditions   Depending on the brittleness of your bones, the consequences of a fall can be serious and long lasting. Home Life   Research by the Association of Aging [facility]) shows that some home accidents among older adults can be prevented by making simple lifestyle changes and basic modifications and repairs to the home environment. Here are some lifestyle changes that experts recommend:   Have your hearing and vision checked regularly. Be sure to wear prescription glasses that are right for you. Speak to your doctor or pharmacist about the possible side effects of your medicines. A number of medicines can cause dizziness. If you have problems with sleep, talk to your doctor. Limit your intake of alcohol. If necessary, use a cane or walker to help maintain your balance. Wear supportive, rubber-soled shoes, even at home. If you live in a region that gets wintry weather, you may want to put special cleats on your shoes to prevent you from slipping on the snow and ice. Exercise regularly to help maintain muscle tone, agility, and balance. Always hold the banister when going up or down stairs. Also, use  bars when getting in or out of the bath or shower, or using the toilet. To avoid dizziness, get up slowly from a lying down position. Sit up first, dangling your legs for a minute or two before rising to a standing position. Overall Home Safety Check   According to the Consumer Product Safety Commision's \"Older Consumer Home Safety Checklist,\" it is important to check for potential hazards in each room. And remember, proper lighting is an essential factor in home safety. If you cannot see clearly, you are more likely to fall. Important questions to ask yourself include:   Are lamp, electric, extension, and telephone cords placed out of the flow of traffic and maintained in good condition?  Have have a small fire extinguisher. Arrange your kitchen with frequently used items on lower shelves to avoid the need to stand on a stepstool to reach them. Make sure countertops are well-lit to avoid injuries while cutting and preparing food. In the Bathroom    Use a non-slip mat or decals in the tub and shower, since wet, soapy tile or porcelain surfaces are extremely slippery. Make sure bathroom rugs are non-skid or tape them firmly to the floor. Bathtubs should have at least one, preferably two, grab bars, firmly attached to structural supports in the wall. (Do not use built-in soap holders or glass shower doors as grab bars.)    Tub seats fitted with non-slip material on the legs allow you to wash sitting down. For people with limited mobility, bathtub transfer benches allow you to slide safely into the tub. Raised toilet seats and toilet safety rails are helpful for those with knee or hip problems. In the [facility]    Make sure you use a nightlight and that the area around your bed is clear of potential obstacles. Be careful with electric blankets and never go to sleep with a heating pad, which can cause serious burns even if on a low setting. Use fire-resistant mattress covers and pillows, and NEVER smoke in bed. Keep a phone next to the bed that is programmed to dial 911 at the push of a button. If you have a chronic condition, you may want to sign on with an automatic call-in service. Typically the system includes a small pendant that connects directly to an emergency medical voice-response system. You should also make arrangements to stay in contact with someonefriend, neighbor, family memberon a regular schedule. Fire Prevention   According to the [employer] (Smoke Alarms for Every) [address] [place] [address], senior citizens are one of the two highest risk groups for death and serious injuries due to residential fires.    When cooking, wear short-sleeved items, never a bulky long-sleeved robe. The [facility]'s Safety Checklist for Older Consumers emphasizes the importance of checking basements, garages, workshops and storage areas for fire hazards, such as volatile liquids, piles of old rags or clothing and overloaded circuits. Never smoke in bed or when lying down on a couch or recliner chair. Small portable electric or kerosene heaters are responsible for many home fires and should be used cautiously if at all. If you do use one, be sure to keep them away from flammable materials. In case of fire, make sure you have a pre-established emergency exit plan. Have a professional check your fireplace and other fuel-burning appliances yearly. Hel[employer]   Baby boomers entering the masters years will continue to see the development of new products to help older adults live safely and independently in spite of age-related changes. Making Life More Livable  , by Vinny Conklin, lists over 1,000 products for \"living well in the mature years,\" such as bathing and mobility aids, household security devices, ergonomically designed knives and peelers, and faucet valves and knobs for temperature control. Medical supply stores and organizations are good sources of information about products that improve your quality of life and insure your safety.      Last Reviewed: November 2009 Keenan Jordan MD   Updated: 3/7/2011     ·

## 2018-03-01 LAB
ESTIMATED AVERAGE GLUCOSE: 119.8 MG/DL
HBA1C MFR BLD: 5.8 %

## 2018-03-05 RX ORDER — HYDRALAZINE HYDROCHLORIDE 25 MG/1
TABLET, FILM COATED ORAL
Qty: 93 TABLET | Refills: 5 | Status: SHIPPED | OUTPATIENT
Start: 2018-03-05 | End: 2018-09-06

## 2018-03-05 RX ORDER — ISOSORBIDE DINITRATE 10 MG/1
TABLET ORAL
Qty: 93 TABLET | Refills: 5 | Status: SHIPPED | OUTPATIENT
Start: 2018-03-05 | End: 2018-09-05 | Stop reason: SDUPTHER

## 2018-03-05 RX ORDER — GLIMEPIRIDE 2 MG/1
TABLET ORAL
Qty: 62 TABLET | Refills: 5 | Status: SHIPPED | OUTPATIENT
Start: 2018-03-05 | End: 2018-09-05 | Stop reason: SDUPTHER

## 2018-03-05 RX ORDER — TRIAMTERENE AND HYDROCHLOROTHIAZIDE 37.5; 25 MG/1; MG/1
CAPSULE ORAL
Qty: 31 CAPSULE | Refills: 5 | Status: SHIPPED | OUTPATIENT
Start: 2018-03-05 | End: 2018-09-05 | Stop reason: SDUPTHER

## 2018-03-05 RX ORDER — ATENOLOL 50 MG/1
TABLET ORAL
Qty: 31 TABLET | Refills: 5 | Status: SHIPPED | OUTPATIENT
Start: 2018-03-05 | End: 2018-09-05 | Stop reason: SDUPTHER

## 2018-03-05 RX ORDER — ROPINIROLE 1 MG/1
TABLET, FILM COATED ORAL
Qty: 31 TABLET | Refills: 5 | Status: SHIPPED | OUTPATIENT
Start: 2018-03-05 | End: 2018-09-05 | Stop reason: SDUPTHER

## 2018-03-05 RX ORDER — ATORVASTATIN CALCIUM 10 MG/1
TABLET, FILM COATED ORAL
Qty: 31 TABLET | Refills: 5 | Status: SHIPPED | OUTPATIENT
Start: 2018-03-05 | End: 2018-09-05 | Stop reason: SDUPTHER

## 2018-03-06 ENCOUNTER — TELEPHONE (OUTPATIENT)
Dept: INTERNAL MEDICINE CLINIC | Age: 83
End: 2018-03-06

## 2018-03-06 DIAGNOSIS — E11.69 HYPERLIPIDEMIA ASSOCIATED WITH TYPE 2 DIABETES MELLITUS (HCC): Primary | ICD-10-CM

## 2018-03-06 DIAGNOSIS — E78.5 HYPERLIPIDEMIA ASSOCIATED WITH TYPE 2 DIABETES MELLITUS (HCC): Primary | ICD-10-CM

## 2018-03-14 DIAGNOSIS — M15.9 PRIMARY OSTEOARTHRITIS INVOLVING MULTIPLE JOINTS: ICD-10-CM

## 2018-03-15 DIAGNOSIS — E11.69 HYPERLIPIDEMIA ASSOCIATED WITH TYPE 2 DIABETES MELLITUS (HCC): ICD-10-CM

## 2018-03-15 DIAGNOSIS — E78.5 HYPERLIPIDEMIA ASSOCIATED WITH TYPE 2 DIABETES MELLITUS (HCC): ICD-10-CM

## 2018-03-15 LAB
BASOPHILS ABSOLUTE: 0.1 K/UL (ref 0–0.2)
BASOPHILS RELATIVE PERCENT: 0.7 %
EOSINOPHILS ABSOLUTE: 0.1 K/UL (ref 0–0.6)
EOSINOPHILS RELATIVE PERCENT: 1 %
HCT VFR BLD CALC: 39.2 % (ref 40.5–52.5)
HEMOGLOBIN: 12.8 G/DL (ref 13.5–17.5)
LYMPHOCYTES ABSOLUTE: 1.6 K/UL (ref 1–5.1)
LYMPHOCYTES RELATIVE PERCENT: 20.9 %
MCH RBC QN AUTO: 28.9 PG (ref 26–34)
MCHC RBC AUTO-ENTMCNC: 32.5 G/DL (ref 31–36)
MCV RBC AUTO: 88.9 FL (ref 80–100)
MONOCYTES ABSOLUTE: 0.8 K/UL (ref 0–1.3)
MONOCYTES RELATIVE PERCENT: 10.8 %
NEUTROPHILS ABSOLUTE: 5.1 K/UL (ref 1.7–7.7)
NEUTROPHILS RELATIVE PERCENT: 66.6 %
PDW BLD-RTO: 14.4 % (ref 12.4–15.4)
PLATELET # BLD: 214 K/UL (ref 135–450)
PMV BLD AUTO: 8.2 FL (ref 5–10.5)
RBC # BLD: 4.41 M/UL (ref 4.2–5.9)
WBC # BLD: 7.6 K/UL (ref 4–11)

## 2018-03-15 RX ORDER — TRAMADOL HYDROCHLORIDE 50 MG/1
TABLET ORAL
Qty: 120 TABLET | Refills: 0 | Status: SHIPPED | OUTPATIENT
Start: 2018-03-15 | End: 2018-04-12 | Stop reason: SDUPTHER

## 2018-04-10 ENCOUNTER — TELEPHONE (OUTPATIENT)
Dept: INTERNAL MEDICINE CLINIC | Age: 83
End: 2018-04-10

## 2018-04-12 DIAGNOSIS — M15.9 PRIMARY OSTEOARTHRITIS INVOLVING MULTIPLE JOINTS: ICD-10-CM

## 2018-04-13 RX ORDER — TRAMADOL HYDROCHLORIDE 50 MG/1
TABLET ORAL
Qty: 120 TABLET | Refills: 0 | Status: SHIPPED | OUTPATIENT
Start: 2018-04-13 | End: 2018-05-13

## 2018-05-30 ENCOUNTER — OFFICE VISIT (OUTPATIENT)
Dept: INTERNAL MEDICINE CLINIC | Age: 83
End: 2018-05-30

## 2018-05-30 VITALS
SYSTOLIC BLOOD PRESSURE: 120 MMHG | HEART RATE: 80 BPM | DIASTOLIC BLOOD PRESSURE: 80 MMHG | WEIGHT: 163 LBS | BODY MASS INDEX: 27.16 KG/M2 | HEIGHT: 65 IN | RESPIRATION RATE: 14 BRPM

## 2018-05-30 DIAGNOSIS — M17.0 PRIMARY OSTEOARTHRITIS OF BOTH KNEES: ICD-10-CM

## 2018-05-30 DIAGNOSIS — E11.9 TYPE 2 DIABETES MELLITUS WITHOUT COMPLICATION, WITHOUT LONG-TERM CURRENT USE OF INSULIN (HCC): Primary | ICD-10-CM

## 2018-05-30 DIAGNOSIS — N18.30 CHRONIC KIDNEY DISEASE (CKD), STAGE III (MODERATE) (HCC): ICD-10-CM

## 2018-05-30 DIAGNOSIS — I25.10 CORONARY ARTERY DISEASE INVOLVING NATIVE HEART WITHOUT ANGINA PECTORIS, UNSPECIFIED VESSEL OR LESION TYPE: ICD-10-CM

## 2018-05-30 DIAGNOSIS — I10 ESSENTIAL HYPERTENSION: ICD-10-CM

## 2018-05-30 DIAGNOSIS — E78.5 HYPERLIPIDEMIA ASSOCIATED WITH TYPE 2 DIABETES MELLITUS (HCC): ICD-10-CM

## 2018-05-30 DIAGNOSIS — E11.69 HYPERLIPIDEMIA ASSOCIATED WITH TYPE 2 DIABETES MELLITUS (HCC): ICD-10-CM

## 2018-05-30 DIAGNOSIS — G25.81 RESTLESS LEGS SYNDROME: ICD-10-CM

## 2018-05-30 PROCEDURE — 1036F TOBACCO NON-USER: CPT | Performed by: INTERNAL MEDICINE

## 2018-05-30 PROCEDURE — 1123F ACP DISCUSS/DSCN MKR DOCD: CPT | Performed by: INTERNAL MEDICINE

## 2018-05-30 PROCEDURE — 99214 OFFICE O/P EST MOD 30 MIN: CPT | Performed by: INTERNAL MEDICINE

## 2018-05-30 PROCEDURE — G8599 NO ASA/ANTIPLAT THER USE RNG: HCPCS | Performed by: INTERNAL MEDICINE

## 2018-05-30 PROCEDURE — G8417 CALC BMI ABV UP PARAM F/U: HCPCS | Performed by: INTERNAL MEDICINE

## 2018-05-30 PROCEDURE — 4040F PNEUMOC VAC/ADMIN/RCVD: CPT | Performed by: INTERNAL MEDICINE

## 2018-05-30 PROCEDURE — G8428 CUR MEDS NOT DOCUMENT: HCPCS | Performed by: INTERNAL MEDICINE

## 2018-05-30 ASSESSMENT — ENCOUNTER SYMPTOMS
RHINORRHEA: 0
ABDOMINAL PAIN: 0
VOMITING: 0
WHEEZING: 0
SHORTNESS OF BREATH: 0
BACK PAIN: 0
NAUSEA: 0

## 2018-06-30 DIAGNOSIS — M19.90 ARTHRITIS: ICD-10-CM

## 2018-07-02 RX ORDER — TRAMADOL HYDROCHLORIDE 50 MG/1
TABLET ORAL
Qty: 120 TABLET | Refills: 0 | Status: SHIPPED | OUTPATIENT
Start: 2018-07-02 | End: 2018-08-01 | Stop reason: SDUPTHER

## 2018-08-01 DIAGNOSIS — M19.90 ARTHRITIS: ICD-10-CM

## 2018-08-02 RX ORDER — TRAMADOL HYDROCHLORIDE 50 MG/1
TABLET ORAL
Qty: 120 TABLET | Refills: 0 | Status: SHIPPED | OUTPATIENT
Start: 2018-08-02 | End: 2018-09-01

## 2018-08-22 ENCOUNTER — OFFICE VISIT (OUTPATIENT)
Dept: INTERNAL MEDICINE CLINIC | Age: 83
End: 2018-08-22

## 2018-08-22 VITALS
RESPIRATION RATE: 14 BRPM | SYSTOLIC BLOOD PRESSURE: 130 MMHG | WEIGHT: 156 LBS | HEART RATE: 80 BPM | DIASTOLIC BLOOD PRESSURE: 80 MMHG | HEIGHT: 65 IN | BODY MASS INDEX: 25.99 KG/M2

## 2018-08-22 DIAGNOSIS — I25.10 CORONARY ARTERY DISEASE INVOLVING NATIVE HEART WITHOUT ANGINA PECTORIS, UNSPECIFIED VESSEL OR LESION TYPE: ICD-10-CM

## 2018-08-22 DIAGNOSIS — R63.4 WEIGHT LOSS: ICD-10-CM

## 2018-08-22 DIAGNOSIS — E78.5 HYPERLIPIDEMIA ASSOCIATED WITH TYPE 2 DIABETES MELLITUS (HCC): ICD-10-CM

## 2018-08-22 DIAGNOSIS — I10 ESSENTIAL HYPERTENSION: ICD-10-CM

## 2018-08-22 DIAGNOSIS — E11.9 TYPE 2 DIABETES MELLITUS WITHOUT COMPLICATION, WITHOUT LONG-TERM CURRENT USE OF INSULIN (HCC): ICD-10-CM

## 2018-08-22 DIAGNOSIS — E11.9 TYPE 2 DIABETES MELLITUS WITHOUT COMPLICATION, WITHOUT LONG-TERM CURRENT USE OF INSULIN (HCC): Primary | ICD-10-CM

## 2018-08-22 DIAGNOSIS — E11.69 HYPERLIPIDEMIA ASSOCIATED WITH TYPE 2 DIABETES MELLITUS (HCC): ICD-10-CM

## 2018-08-22 DIAGNOSIS — G25.81 RESTLESS LEGS SYNDROME: ICD-10-CM

## 2018-08-22 DIAGNOSIS — R10.84 GENERALIZED ABDOMINAL PAIN: ICD-10-CM

## 2018-08-22 DIAGNOSIS — K59.00 CONSTIPATION, UNSPECIFIED CONSTIPATION TYPE: ICD-10-CM

## 2018-08-22 PROCEDURE — 99214 OFFICE O/P EST MOD 30 MIN: CPT | Performed by: INTERNAL MEDICINE

## 2018-08-22 PROCEDURE — G8599 NO ASA/ANTIPLAT THER USE RNG: HCPCS | Performed by: INTERNAL MEDICINE

## 2018-08-22 PROCEDURE — 1123F ACP DISCUSS/DSCN MKR DOCD: CPT | Performed by: INTERNAL MEDICINE

## 2018-08-22 PROCEDURE — G8417 CALC BMI ABV UP PARAM F/U: HCPCS | Performed by: INTERNAL MEDICINE

## 2018-08-22 PROCEDURE — 4040F PNEUMOC VAC/ADMIN/RCVD: CPT | Performed by: INTERNAL MEDICINE

## 2018-08-22 PROCEDURE — G8427 DOCREV CUR MEDS BY ELIG CLIN: HCPCS | Performed by: INTERNAL MEDICINE

## 2018-08-22 PROCEDURE — 1101F PT FALLS ASSESS-DOCD LE1/YR: CPT | Performed by: INTERNAL MEDICINE

## 2018-08-22 PROCEDURE — 1036F TOBACCO NON-USER: CPT | Performed by: INTERNAL MEDICINE

## 2018-08-22 RX ORDER — SENNA PLUS 8.6 MG/1
2 TABLET ORAL 2 TIMES DAILY
Qty: 120 TABLET | Refills: 11 | Status: SHIPPED | OUTPATIENT
Start: 2018-08-22 | End: 2019-08-13

## 2018-08-22 ASSESSMENT — ENCOUNTER SYMPTOMS
WHEEZING: 0
BACK PAIN: 0
VOMITING: 0
SHORTNESS OF BREATH: 0
RHINORRHEA: 0
ABDOMINAL PAIN: 0
NAUSEA: 0

## 2018-08-22 NOTE — PROGRESS NOTES
medications for this visit. Review of Systems   Constitutional: Negative for activity change and appetite change. HENT: Negative for postnasal drip and rhinorrhea. Respiratory: Negative for shortness of breath and wheezing. Cardiovascular: Negative for chest pain, palpitations and leg swelling. Gastrointestinal: Negative for abdominal pain, nausea and vomiting. Genitourinary: Negative for difficulty urinating and frequency. Musculoskeletal: Negative for back pain and joint swelling. Skin: Negative for rash. Neurological: Negative for light-headedness. Psychiatric/Behavioral: Negative for sleep disturbance. There are no changes to past medical history, family history, social history or review of systems(except as noted in the history section) since prior note (all reviewed with patient). /80 (Site: Right Arm, Position: Sitting, Cuff Size: Medium Adult)   Pulse 80   Resp 14   Ht 5' 5\" (1.651 m)   Wt 156 lb (70.8 kg)   BMI 25.96 kg/m²      Objective:   Physical Exam   Constitutional: He is oriented to person, place, and time. He appears well-developed and well-nourished. HENT:   Head: Normocephalic. Eyes: Pupils are equal, round, and reactive to light. Conjunctivae and EOM are normal.   Neck: Trachea normal and normal range of motion. Neck supple. No JVD present. Carotid bruit is not present. No thyroid mass and no thyromegaly present. Cardiovascular: Normal rate, regular rhythm and normal heart sounds. Exam reveals no gallop. No murmur heard. Pulmonary/Chest: Effort normal and breath sounds normal. No respiratory distress. He has no wheezes. He has no rales. Abdominal: Soft. Bowel sounds are normal. He exhibits no distension and no mass. There is no splenomegaly or hepatomegaly. There is no tenderness. Musculoskeletal: He exhibits no edema. Lymphadenopathy:     He has no cervical adenopathy.    Neurological: He is alert and oriented to person, place, and his DM is controlled. Patient does not have polydipsia and polyuria. No numbness or tingling. Last A1C was 5.8. Patient's BP is controlled on current medications. No chest pain or dyspnea. His OA is progressive. He uses a wheelchair. His restless legs is stable. He saw GI for mild anemia. He is on iron pills and is better. Current Outpatient Prescriptions   Medication Sig Dispense Refill    senna (SENOKOT) 8.6 MG tablet Take 2 tablets by mouth 2 times daily 120 tablet 11    traMADol (ULTRAM) 50 MG tablet TAKE 1 TABLET EVERY 6 HOURS AS NEEDED 120 tablet 0    isosorbide dinitrate (ISORDIL) 10 MG tablet TAKE 1 TABLET THREE TIMES DAILY 93 tablet 5    hydrALAZINE (APRESOLINE) 25 MG tablet TAKE 1 TABLET THREE TIMES DAILY 93 tablet 5    glimepiride (AMARYL) 2 MG tablet TAKE 1 TABLET TWICE DAILY 62 tablet 5    atorvastatin (LIPITOR) 10 MG tablet TAKE ONE TABLET AT BEDTIME 31 tablet 5    atenolol (TENORMIN) 50 MG tablet TAKE (1) TABLET DAILY 31 tablet 5    triamterene-hydrochlorothiazide (DYAZIDE) 37.5-25 MG per capsule TAKE 1 CAPSULE ONCE DAILY 31 capsule 5    rOPINIRole (REQUIP) 1 MG tablet TAKE ONE TABLET AT BEDTIME 31 tablet 5    olopatadine (PATANOL) 0.1 % ophthalmic solution PLACE 1 DROP INTO EACH EYE TWICE DAILY 5 mL 0    Misc. Devices (MARIA ISABEL ROLLING WALKER [employer]) MISC Use for ambulation 1 each 0    aspirin 81 MG tablet Take 1 tablet by mouth daily. 30 tablet      No current facility-administered medications for this visit. Review of Systems   Constitutional: Negative for activity change and appetite change. HENT: Negative for postnasal drip and rhinorrhea. Respiratory: Negative for shortness of breath and wheezing. Cardiovascular: Negative for chest pain, palpitations and leg swelling. Gastrointestinal: Negative for abdominal pain, nausea and vomiting. Genitourinary: Negative for difficulty urinating and frequency.    Musculoskeletal: Negative for back pain and joint lesion type     5. Restless legs syndrome     6. Weight loss  Juan Davis M.D. ([facility])    CT CHEST WO CONTRAST    CT ABDOMEN PELVIS WO CONTRAST Additional Contrast? Oral   7. Generalized abdominal pain  CT CHEST WO CONTRAST    CT ABDOMEN PELVIS WO CONTRAST Additional Contrast? Oral   8. Constipation, unspecified constipation type  CT CHEST WO CONTRAST    CT ABDOMEN PELVIS WO CONTRAST Additional Contrast? Oral           Plan:       # DM type 2: well controlled. No issues. Doing well. # CAD: no chest pain. No issues. #  Hypertension: is well controlled. #  CKD: stable. It is stage 3. #  Allergies:  Uses Patanol drops. #  OA: It is progressive. Pain controlled. #  Hyperlipidemia: at goal.      Claribel Morales received counseling on the following healthy behaviors: nutrition and exercise  Reviewed prior labs and health maintenance  Continue current medications, diet and exercise. Discussed use, benefit, and side effects of prescribed medications. Barriers to medication compliance addressed. Patient given educational materials - see patient instructions  Was a self-tracking handout given in paper form or via [email or web address]? Yes    Requested Prescriptions     Signed Prescriptions Disp Refills    senna (SENOKOT) 8.6 MG tablet 120 tablet 11     Sig: Take 2 tablets by mouth 2 times daily       All patient questions answered. Patient voiced understanding. Quality Measures    Body mass index is 25.96 kg/m². Elevated. Weight control planned discussed conventional weight loss and Healthy diet and regular exercise. BP: 130/80. Blood pressure is normal. Treatment plan consists of No treatment change needed. Fall Risk 2/28/2018 5/17/2017 3/7/2016 1/27/2015 4/14/2014   2 or more falls in past year? no no no no no   Fall with injury in past year? no no no no no     The patient does not have a history of falls. I did not - not indicated , complete a risk assessment for falls.  A plan of care for falls No Treatment plan indicated    Lab Results   Component Value Date    LDLCALC 45 02/28/2018    (goal LDL reduction with dx if diabetes is 50% LDL reduction)    PHQ Scores 2/28/2018 11/22/2017 8/30/2016 4/20/2015 4/14/2014   PHQ2 Score 0 0 0 0 0   PHQ9 Score 0 0 0 0 0     Interpretation of Total Score Depression Severity: 1-4 = Minimal depression, 5-9 = Mild depression, 10-14 = Moderate depression, 15-19 = Moderately severe depression, 20-27 = Severe depression      Derrell Mcdonald was evaluated today and a DME order was entered for a wheeled walker with seat because he requires this to successfully complete daily living tasks of eating, bathing, toileting, personal cares and grooming. A wheeled walker with seat is necessary due to the patient's unsteady gait, upper body weakness, inability to  and ambulation device, ambulating only short distances by pushing a walker, and the need to sit for a short time before resuming ambulation. These tasks cannot be completed with a lesser ambulation device such as a cane, crutch, or standard walker. The need for this equipment was discussed with the patient and he understands and is in agreement. Decrease calorie intake. Exercise,weight loss recommended. The current medical regimen is effective;  continue present plan and medications. See orders. Subjective:      Patient ID: Derrell Mcdonald is a 80 y.o. male. HPI    Patient is here for follow up of CAD, diabetes, hypertension, hyperlipidemia, OA, renal failure and restless legs syndrome. Patient presents for routine coronary artery disease follow-up. CAD- Current symptoms: non-existent Pain radiation: none Patient also complains of no other symptoms. Doing well. Patient's does not check his sugars but his DM is controlled. Patient does not have polydipsia and polyuria. No numbness or tingling. Last A1C was 5.8.   Patient's BP is controlled on current medications. No chest pain or dyspnea. His OA is progressive. He uses a wheelchair. His restless legs is stable. He saw GI for mild anemia. He is on iron pills and is better. Current Outpatient Prescriptions   Medication Sig Dispense Refill    senna (SENOKOT) 8.6 MG tablet Take 2 tablets by mouth 2 times daily 120 tablet 11    traMADol (ULTRAM) 50 MG tablet TAKE 1 TABLET EVERY 6 HOURS AS NEEDED 120 tablet 0    isosorbide dinitrate (ISORDIL) 10 MG tablet TAKE 1 TABLET THREE TIMES DAILY 93 tablet 5    hydrALAZINE (APRESOLINE) 25 MG tablet TAKE 1 TABLET THREE TIMES DAILY 93 tablet 5    glimepiride (AMARYL) 2 MG tablet TAKE 1 TABLET TWICE DAILY 62 tablet 5    atorvastatin (LIPITOR) 10 MG tablet TAKE ONE TABLET AT BEDTIME 31 tablet 5    atenolol (TENORMIN) 50 MG tablet TAKE (1) TABLET DAILY 31 tablet 5    triamterene-hydrochlorothiazide (DYAZIDE) 37.5-25 MG per capsule TAKE 1 CAPSULE ONCE DAILY 31 capsule 5    rOPINIRole (REQUIP) 1 MG tablet TAKE ONE TABLET AT BEDTIME 31 tablet 5    olopatadine (PATANOL) 0.1 % ophthalmic solution PLACE 1 DROP INTO EACH EYE TWICE DAILY 5 mL 0    Misc. Devices (MARIA ISABEL ROLLING WALKER [employer]) MISC Use for ambulation 1 each 0    aspirin 81 MG tablet Take 1 tablet by mouth daily. 30 tablet      No current facility-administered medications for this visit. Review of Systems   Constitutional: Negative for activity change and appetite change. HENT: Negative for postnasal drip and rhinorrhea. Respiratory: Negative for shortness of breath and wheezing. Cardiovascular: Negative for chest pain, palpitations and leg swelling. Gastrointestinal: Negative for abdominal pain, nausea and vomiting. Genitourinary: Negative for difficulty urinating and frequency. Musculoskeletal: Negative for back pain and joint swelling. Skin: Negative for rash. Neurological: Negative for light-headedness. Psychiatric/Behavioral: Negative for sleep disturbance.      There CONTRAST Additional Contrast? Oral   7. Generalized abdominal pain  CT CHEST WO CONTRAST    CT ABDOMEN PELVIS WO CONTRAST Additional Contrast? Oral   8. Constipation, unspecified constipation type  CT CHEST WO CONTRAST    CT ABDOMEN PELVIS WO CONTRAST Additional Contrast? Oral           Plan:       # DM type 2: well controlled. No issues. Doing well. # CAD: no chest pain. No issues. #  Hypertension: is well controlled. #  CKD: stable. It is stage 3. #  Allergies:  Uses Patanol drops. #  OA: It is progressive. Pain controlled. #  Hyperlipidemia: at goal.         Decrease calorie intake. Exercise,weight loss recommended. The current medical regimen is effective;  continue present plan and medications. See orders.

## 2018-08-23 LAB — TSH SERPL DL<=0.05 MIU/L-ACNC: 1.88 UIU/ML (ref 0.27–4.2)

## 2018-09-26 ENCOUNTER — HOSPITAL ENCOUNTER (OUTPATIENT)
Dept: CT IMAGING | Age: 83
Discharge: HOME | End: 2018-09-26
Payer: MEDICARE

## 2018-09-26 DIAGNOSIS — R63.4 WEIGHT LOSS: ICD-10-CM

## 2018-09-26 DIAGNOSIS — R10.84 GENERALIZED ABDOMINAL PAIN: ICD-10-CM

## 2018-09-26 DIAGNOSIS — K59.00 CONSTIPATION, UNSPECIFIED CONSTIPATION TYPE: ICD-10-CM

## 2018-09-26 PROCEDURE — 74176 CT ABD & PELVIS W/O CONTRAST: CPT

## 2018-09-26 PROCEDURE — 6360000004 HC RX CONTRAST MEDICATION: Performed by: INTERNAL MEDICINE

## 2018-09-26 PROCEDURE — 71250 CT THORAX DX C-: CPT

## 2018-09-26 RX ADMIN — IOHEXOL 50 ML: 240 INJECTION, SOLUTION INTRATHECAL; INTRAVASCULAR; INTRAVENOUS; ORAL at 17:28

## 2018-11-02 DIAGNOSIS — M17.0 PRIMARY OSTEOARTHRITIS OF BOTH KNEES: Primary | ICD-10-CM

## 2018-11-05 RX ORDER — TRAMADOL HYDROCHLORIDE 50 MG/1
TABLET ORAL
Qty: 120 TABLET | Refills: 0 | Status: SHIPPED | OUTPATIENT
Start: 2018-11-05 | End: 2018-12-04 | Stop reason: SDUPTHER

## 2018-11-21 ENCOUNTER — OFFICE VISIT (OUTPATIENT)
Dept: INTERNAL MEDICINE CLINIC | Age: 83
End: 2018-11-21

## 2018-11-21 VITALS
DIASTOLIC BLOOD PRESSURE: 80 MMHG | RESPIRATION RATE: 14 BRPM | SYSTOLIC BLOOD PRESSURE: 120 MMHG | HEIGHT: 65 IN | WEIGHT: 162 LBS | HEART RATE: 70 BPM | BODY MASS INDEX: 26.99 KG/M2

## 2018-11-21 DIAGNOSIS — E11.9 TYPE 2 DIABETES MELLITUS WITHOUT COMPLICATION, WITHOUT LONG-TERM CURRENT USE OF INSULIN (HCC): Primary | ICD-10-CM

## 2018-11-21 DIAGNOSIS — E78.5 HYPERLIPIDEMIA ASSOCIATED WITH TYPE 2 DIABETES MELLITUS (HCC): ICD-10-CM

## 2018-11-21 DIAGNOSIS — I10 ESSENTIAL HYPERTENSION: ICD-10-CM

## 2018-11-21 DIAGNOSIS — N18.30 CHRONIC KIDNEY DISEASE (CKD), STAGE III (MODERATE) (HCC): ICD-10-CM

## 2018-11-21 DIAGNOSIS — M17.0 PRIMARY OSTEOARTHRITIS OF BOTH KNEES: ICD-10-CM

## 2018-11-21 DIAGNOSIS — E11.9 TYPE 2 DIABETES MELLITUS WITHOUT COMPLICATION, WITHOUT LONG-TERM CURRENT USE OF INSULIN (HCC): ICD-10-CM

## 2018-11-21 DIAGNOSIS — E11.69 HYPERLIPIDEMIA ASSOCIATED WITH TYPE 2 DIABETES MELLITUS (HCC): ICD-10-CM

## 2018-11-21 DIAGNOSIS — I25.10 CORONARY ARTERY DISEASE INVOLVING NATIVE HEART WITHOUT ANGINA PECTORIS, UNSPECIFIED VESSEL OR LESION TYPE: ICD-10-CM

## 2018-11-21 PROCEDURE — G8427 DOCREV CUR MEDS BY ELIG CLIN: HCPCS | Performed by: INTERNAL MEDICINE

## 2018-11-21 PROCEDURE — G8417 CALC BMI ABV UP PARAM F/U: HCPCS | Performed by: INTERNAL MEDICINE

## 2018-11-21 PROCEDURE — 4040F PNEUMOC VAC/ADMIN/RCVD: CPT | Performed by: INTERNAL MEDICINE

## 2018-11-21 PROCEDURE — G8599 NO ASA/ANTIPLAT THER USE RNG: HCPCS | Performed by: INTERNAL MEDICINE

## 2018-11-21 PROCEDURE — G8484 FLU IMMUNIZE NO ADMIN: HCPCS | Performed by: INTERNAL MEDICINE

## 2018-11-21 PROCEDURE — 99214 OFFICE O/P EST MOD 30 MIN: CPT | Performed by: INTERNAL MEDICINE

## 2018-11-21 PROCEDURE — 1101F PT FALLS ASSESS-DOCD LE1/YR: CPT | Performed by: INTERNAL MEDICINE

## 2018-11-21 PROCEDURE — 1123F ACP DISCUSS/DSCN MKR DOCD: CPT | Performed by: INTERNAL MEDICINE

## 2018-11-21 PROCEDURE — 1036F TOBACCO NON-USER: CPT | Performed by: INTERNAL MEDICINE

## 2018-11-21 ASSESSMENT — ENCOUNTER SYMPTOMS
ABDOMINAL PAIN: 0
BACK PAIN: 0
SHORTNESS OF BREATH: 0
NAUSEA: 0
WHEEZING: 0
VOMITING: 0
RHINORRHEA: 0

## 2018-11-21 NOTE — PROGRESS NOTES
this visit. Review of Systems   Constitutional: Negative for activity change and appetite change. HENT: Negative for postnasal drip and rhinorrhea. Respiratory: Negative for shortness of breath and wheezing. Cardiovascular: Negative for chest pain, palpitations and leg swelling. Gastrointestinal: Negative for abdominal pain, nausea and vomiting. Genitourinary: Negative for difficulty urinating and frequency. Musculoskeletal: Negative for back pain and joint swelling. Skin: Negative for rash. Neurological: Negative for light-headedness. Psychiatric/Behavioral: Negative for sleep disturbance. There are no changes to past medical history, family history, social history or review of systems(except as noted in the history section) since prior note (all reviewed with patient). /80 (Site: Left Upper Arm, Position: Sitting, Cuff Size: Medium Adult)   Pulse 70   Resp 14   Ht 5' 5\" (1.651 m)   Wt 162 lb (73.5 kg)   BMI 26.96 kg/m²      Objective:   Physical Exam   Constitutional: He is oriented to person, place, and time. He appears well-developed and well-nourished. HENT:   Head: Normocephalic. Eyes: Pupils are equal, round, and reactive to light. Conjunctivae and EOM are normal.   Neck: Trachea normal and normal range of motion. Neck supple. No JVD present. Carotid bruit is not present. No thyroid mass and no thyromegaly present. Cardiovascular: Normal rate, regular rhythm and normal heart sounds. Exam reveals no gallop. No murmur heard. Pulmonary/Chest: Effort normal and breath sounds normal. No respiratory distress. He has no wheezes. He has no rales. Abdominal: Soft. Bowel sounds are normal. He exhibits no distension and no mass. There is no splenomegaly or hepatomegaly. There is no tenderness. Musculoskeletal: He exhibits no edema. Lymphadenopathy:     He has no cervical adenopathy. Neurological: He is alert and oriented to person, place, and time. prior note (all reviewed with patient). Ht 5' 5\" (1.651 m)   Wt 162 lb (73.5 kg)   BMI 26.96 kg/m²      Objective:   Physical Exam   Constitutional: He is oriented to person, place, and time. He appears well-developed and well-nourished. HENT:   Head: Normocephalic. Eyes: Conjunctivae and EOM are normal. Pupils are equal, round, and reactive to light. Neck: Trachea normal and normal range of motion. Neck supple. No JVD present. Carotid bruit is not present. No thyroid mass and no thyromegaly present. Cardiovascular: Normal rate, regular rhythm and normal heart sounds. Exam reveals no gallop. No murmur heard. Pulmonary/Chest: Effort normal and breath sounds normal. No respiratory distress. He has no wheezes. He has no rales. Abdominal: Soft. Bowel sounds are normal. He exhibits no distension and no mass. There is no splenomegaly or hepatomegaly. There is no tenderness. Musculoskeletal: He exhibits no edema. Lymphadenopathy:     He has no cervical adenopathy. Neurological: He is alert and oriented to person, place, and time. Skin: Skin is warm and dry. No rash noted. Psychiatric: He has a normal mood and affect. His behavior is normal. Judgment and thought content normal.       Assessment:       Diagnosis Orders   1. Type 2 diabetes mellitus without complication, without long-term current use of insulin ([facility])  Comprehensive Metabolic Panel    CBC Auto Differential    Uric Acid    Hemoglobin A1C   2. Hyperlipidemia associated with type 2 diabetes mellitus ([facility] [address]     3. Essential hypertension     4. Coronary artery disease involving native heart without angina pectoris, unspecified vessel or lesion type     5. Chronic kidney disease (CKD), stage III (moderate) ([facility])     6. Primary osteoarthritis of both knees             Plan:       # DM type 2: well controlled. No issues. Doing well. # CAD: no chest pain. No issues. #  Hypertension: is well controlled. #  CKD: stable. It is stage 3.   #

## 2018-11-22 LAB
A/G RATIO: 1.7 (ref 1.1–2.2)
ALBUMIN SERPL-MCNC: 4.2 G/DL (ref 3.4–5)
ALP BLD-CCNC: 113 U/L (ref 40–129)
ALT SERPL-CCNC: 31 U/L (ref 10–40)
ANION GAP SERPL CALCULATED.3IONS-SCNC: 15 MMOL/L (ref 3–16)
AST SERPL-CCNC: 23 U/L (ref 15–37)
BASOPHILS ABSOLUTE: 0 K/UL (ref 0–0.2)
BASOPHILS RELATIVE PERCENT: 0.5 %
BILIRUB SERPL-MCNC: 0.9 MG/DL (ref 0–1)
BUN BLDV-MCNC: 28 MG/DL (ref 7–20)
CALCIUM SERPL-MCNC: 9.2 MG/DL (ref 8.3–10.6)
CHLORIDE BLD-SCNC: 101 MMOL/L (ref 99–110)
CO2: 26 MMOL/L (ref 21–32)
CREAT SERPL-MCNC: 1.6 MG/DL (ref 0.8–1.3)
EOSINOPHILS ABSOLUTE: 0.1 K/UL (ref 0–0.6)
EOSINOPHILS RELATIVE PERCENT: 0.8 %
ESTIMATED AVERAGE GLUCOSE: 111.2 MG/DL
GFR AFRICAN AMERICAN: 49
GFR NON-AFRICAN AMERICAN: 40
GLOBULIN: 2.5 G/DL
GLUCOSE BLD-MCNC: 100 MG/DL (ref 70–99)
HBA1C MFR BLD: 5.5 %
HCT VFR BLD CALC: 41.8 % (ref 40.5–52.5)
HEMOGLOBIN: 14 G/DL (ref 13.5–17.5)
LYMPHOCYTES ABSOLUTE: 1.3 K/UL (ref 1–5.1)
LYMPHOCYTES RELATIVE PERCENT: 15.6 %
MCH RBC QN AUTO: 32.3 PG (ref 26–34)
MCHC RBC AUTO-ENTMCNC: 33.4 G/DL (ref 31–36)
MCV RBC AUTO: 96.6 FL (ref 80–100)
MONOCYTES ABSOLUTE: 0.7 K/UL (ref 0–1.3)
MONOCYTES RELATIVE PERCENT: 8.6 %
NEUTROPHILS ABSOLUTE: 6.2 K/UL (ref 1.7–7.7)
NEUTROPHILS RELATIVE PERCENT: 74.5 %
PDW BLD-RTO: 12.8 % (ref 12.4–15.4)
PLATELET # BLD: 266 K/UL (ref 135–450)
PMV BLD AUTO: 8 FL (ref 5–10.5)
POTASSIUM SERPL-SCNC: 4.5 MMOL/L (ref 3.5–5.1)
RBC # BLD: 4.33 M/UL (ref 4.2–5.9)
SODIUM BLD-SCNC: 142 MMOL/L (ref 136–145)
TOTAL PROTEIN: 6.7 G/DL (ref 6.4–8.2)
URIC ACID, SERUM: 7.5 MG/DL (ref 3.5–7.2)
WBC # BLD: 8.4 K/UL (ref 4–11)

## 2018-12-04 DIAGNOSIS — M17.0 PRIMARY OSTEOARTHRITIS OF BOTH KNEES: ICD-10-CM

## 2018-12-04 RX ORDER — ROPINIROLE 1 MG/1
TABLET, FILM COATED ORAL
Qty: 31 TABLET | Refills: 0 | Status: SHIPPED | OUTPATIENT
Start: 2018-12-04 | End: 2019-01-02 | Stop reason: SDUPTHER

## 2018-12-04 RX ORDER — HYDRALAZINE HYDROCHLORIDE 25 MG/1
TABLET, FILM COATED ORAL
Qty: 93 TABLET | Refills: 0 | Status: SHIPPED | OUTPATIENT
Start: 2018-12-04 | End: 2019-02-04

## 2018-12-04 RX ORDER — ATENOLOL 50 MG/1
TABLET ORAL
Qty: 31 TABLET | Refills: 0 | Status: SHIPPED | OUTPATIENT
Start: 2018-12-04 | End: 2019-01-02 | Stop reason: SDUPTHER

## 2018-12-04 RX ORDER — GLIMEPIRIDE 2 MG/1
TABLET ORAL
Qty: 62 TABLET | Refills: 0 | Status: SHIPPED | OUTPATIENT
Start: 2018-12-04 | End: 2019-01-02 | Stop reason: SDUPTHER

## 2018-12-04 RX ORDER — ATORVASTATIN CALCIUM 10 MG/1
TABLET, FILM COATED ORAL
Qty: 31 TABLET | Refills: 0 | Status: SHIPPED | OUTPATIENT
Start: 2018-12-04 | End: 2019-01-02 | Stop reason: SDUPTHER

## 2018-12-04 RX ORDER — TRAMADOL HYDROCHLORIDE 50 MG/1
TABLET ORAL
Qty: 120 TABLET | Refills: 0 | Status: SHIPPED | OUTPATIENT
Start: 2018-12-04 | End: 2019-01-02 | Stop reason: SDUPTHER

## 2018-12-04 RX ORDER — ISOSORBIDE DINITRATE 10 MG/1
TABLET ORAL
Qty: 93 TABLET | Refills: 0 | Status: SHIPPED | OUTPATIENT
Start: 2018-12-04 | End: 2019-01-02 | Stop reason: SDUPTHER

## 2018-12-04 RX ORDER — TRIAMTERENE AND HYDROCHLOROTHIAZIDE 37.5; 25 MG/1; MG/1
CAPSULE ORAL
Qty: 31 CAPSULE | Refills: 0 | Status: SHIPPED | OUTPATIENT
Start: 2018-12-04 | End: 2019-01-02 | Stop reason: SDUPTHER

## 2019-01-01 ENCOUNTER — CLINICAL DOCUMENTATION (OUTPATIENT)
Dept: SPIRITUAL SERVICES | Age: 84
End: 2019-01-01

## 2019-01-01 ENCOUNTER — HOSPITAL ENCOUNTER (EMERGENCY)
Age: 84
Discharge: HOME | End: 2019-09-08
Attending: EMERGENCY MEDICINE
Payer: MEDICARE

## 2019-01-01 ENCOUNTER — APPOINTMENT (OUTPATIENT)
Dept: GENERAL RADIOLOGY | Age: 84
End: 2019-01-01
Payer: MEDICARE

## 2019-01-01 ENCOUNTER — OFFICE VISIT (OUTPATIENT)
Dept: INTERNAL MEDICINE CLINIC | Age: 84
End: 2019-01-01

## 2019-01-01 ENCOUNTER — HOSPITAL ENCOUNTER (EMERGENCY)
Age: 84
Discharge: HOME | End: 2019-09-23
Attending: EMERGENCY MEDICINE
Payer: MEDICARE

## 2019-01-01 ENCOUNTER — TELEPHONE (OUTPATIENT)
Dept: INTERNAL MEDICINE CLINIC | Age: 84
End: 2019-01-01

## 2019-01-01 VITALS
OXYGEN SATURATION: 99 % | SYSTOLIC BLOOD PRESSURE: 120 MMHG | DIASTOLIC BLOOD PRESSURE: 80 MMHG | WEIGHT: 142 LBS | HEART RATE: 90 BPM | HEIGHT: 65 IN | RESPIRATION RATE: 14 BRPM | BODY MASS INDEX: 23.66 KG/M2

## 2019-01-01 VITALS
BODY MASS INDEX: 26.33 KG/M2 | TEMPERATURE: 98.5 F | WEIGHT: 158 LBS | RESPIRATION RATE: 18 BRPM | HEART RATE: 95 BPM | OXYGEN SATURATION: 100 % | DIASTOLIC BLOOD PRESSURE: 75 MMHG | HEIGHT: 65 IN | SYSTOLIC BLOOD PRESSURE: 130 MMHG

## 2019-01-01 VITALS
DIASTOLIC BLOOD PRESSURE: 82 MMHG | RESPIRATION RATE: 17 BRPM | WEIGHT: 142 LBS | SYSTOLIC BLOOD PRESSURE: 130 MMHG | BODY MASS INDEX: 23.66 KG/M2 | OXYGEN SATURATION: 98 % | HEIGHT: 65 IN | TEMPERATURE: 98.7 F | HEART RATE: 105 BPM

## 2019-01-01 VITALS
DIASTOLIC BLOOD PRESSURE: 80 MMHG | HEART RATE: 70 BPM | BODY MASS INDEX: 24.99 KG/M2 | SYSTOLIC BLOOD PRESSURE: 128 MMHG | HEIGHT: 65 IN | WEIGHT: 150 LBS | RESPIRATION RATE: 14 BRPM

## 2019-01-01 VITALS
HEIGHT: 65 IN | WEIGHT: 146 LBS | BODY MASS INDEX: 24.32 KG/M2 | SYSTOLIC BLOOD PRESSURE: 110 MMHG | HEART RATE: 72 BPM | TEMPERATURE: 97.7 F | DIASTOLIC BLOOD PRESSURE: 65 MMHG

## 2019-01-01 DIAGNOSIS — I10 ESSENTIAL HYPERTENSION: ICD-10-CM

## 2019-01-01 DIAGNOSIS — R79.89 ELEVATED LFTS: ICD-10-CM

## 2019-01-01 DIAGNOSIS — I25.10 CORONARY ARTERY DISEASE INVOLVING NATIVE HEART WITHOUT ANGINA PECTORIS, UNSPECIFIED VESSEL OR LESION TYPE: ICD-10-CM

## 2019-01-01 DIAGNOSIS — R73.9 HYPERGLYCEMIA: Primary | ICD-10-CM

## 2019-01-01 DIAGNOSIS — N18.30 CHRONIC KIDNEY DISEASE (CKD), STAGE III (MODERATE) (HCC): ICD-10-CM

## 2019-01-01 DIAGNOSIS — R05.9 COUGH: ICD-10-CM

## 2019-01-01 DIAGNOSIS — G25.81 RESTLESS LEGS SYNDROME: ICD-10-CM

## 2019-01-01 DIAGNOSIS — E78.5 HYPERLIPIDEMIA ASSOCIATED WITH TYPE 2 DIABETES MELLITUS (HCC): ICD-10-CM

## 2019-01-01 DIAGNOSIS — Z71.89 ACP (ADVANCE CARE PLANNING): ICD-10-CM

## 2019-01-01 DIAGNOSIS — E11.69 HYPERLIPIDEMIA ASSOCIATED WITH TYPE 2 DIABETES MELLITUS (HCC): ICD-10-CM

## 2019-01-01 DIAGNOSIS — R31.0 GROSS HEMATURIA: Primary | ICD-10-CM

## 2019-01-01 DIAGNOSIS — E11.9 TYPE 2 DIABETES MELLITUS WITHOUT COMPLICATION, WITHOUT LONG-TERM CURRENT USE OF INSULIN (HCC): ICD-10-CM

## 2019-01-01 DIAGNOSIS — Z00.00 ENCOUNTER FOR INITIAL PREVENTIVE PHYSICAL EXAMINATION COVERED BY MEDICARE: Primary | ICD-10-CM

## 2019-01-01 DIAGNOSIS — E11.9 TYPE 2 DIABETES MELLITUS WITHOUT COMPLICATION, WITHOUT LONG-TERM CURRENT USE OF INSULIN (HCC): Primary | ICD-10-CM

## 2019-01-01 DIAGNOSIS — Z00.00 ROUTINE GENERAL MEDICAL EXAMINATION AT A HEALTH CARE FACILITY: ICD-10-CM

## 2019-01-01 DIAGNOSIS — C61 ADENOCARCINOMA OF PROSTATE (HCC): ICD-10-CM

## 2019-01-01 DIAGNOSIS — J20.9 ACUTE BRONCHITIS, UNSPECIFIED ORGANISM: Primary | ICD-10-CM

## 2019-01-01 LAB
A/G RATIO: 0.9 (ref 1.1–2.2)
A/G RATIO: 1.2 (ref 1.1–2.2)
ALBUMIN SERPL-MCNC: 3.4 G/DL (ref 3.4–5)
ALBUMIN SERPL-MCNC: 3.5 G/DL (ref 3.4–5)
ALP BLD-CCNC: 108 U/L (ref 40–129)
ALP BLD-CCNC: 118 U/L (ref 40–129)
ALT SERPL-CCNC: 6 U/L (ref 10–40)
ALT SERPL-CCNC: 6 U/L (ref 10–40)
ANION GAP SERPL CALCULATED.3IONS-SCNC: 12 MMOL/L (ref 3–16)
ANION GAP SERPL CALCULATED.3IONS-SCNC: 12 MMOL/L (ref 3–16)
ANION GAP SERPL CALCULATED.3IONS-SCNC: 14 MMOL/L (ref 3–16)
ANION GAP SERPL CALCULATED.3IONS-SCNC: 14 MMOL/L (ref 3–16)
ANION GAP SERPL CALCULATED.3IONS-SCNC: 17 MMOL/L (ref 3–16)
AST SERPL-CCNC: 11 U/L (ref 15–37)
AST SERPL-CCNC: 13 U/L (ref 15–37)
BASOPHILS ABSOLUTE: 0.1 K/UL (ref 0–0.2)
BASOPHILS RELATIVE PERCENT: 0.8 %
BASOPHILS RELATIVE PERCENT: 1 %
BASOPHILS RELATIVE PERCENT: 1 %
BASOPHILS RELATIVE PERCENT: 1.1 %
BILIRUB SERPL-MCNC: 0.5 MG/DL (ref 0–1)
BILIRUB SERPL-MCNC: 0.5 MG/DL (ref 0–1)
BILIRUBIN URINE: ABNORMAL
BLOOD, URINE: ABNORMAL
BUN BLDV-MCNC: 20 MG/DL (ref 7–20)
BUN BLDV-MCNC: 23 MG/DL (ref 7–20)
BUN BLDV-MCNC: 24 MG/DL (ref 7–20)
BUN BLDV-MCNC: 25 MG/DL (ref 7–20)
BUN BLDV-MCNC: 26 MG/DL (ref 7–20)
CALCIUM SERPL-MCNC: 8.8 MG/DL (ref 8.3–10.6)
CALCIUM SERPL-MCNC: 8.9 MG/DL (ref 8.3–10.6)
CALCIUM SERPL-MCNC: 8.9 MG/DL (ref 8.3–10.6)
CALCIUM SERPL-MCNC: 9 MG/DL (ref 8.3–10.6)
CALCIUM SERPL-MCNC: 9 MG/DL (ref 8.3–10.6)
CHLORIDE BLD-SCNC: 100 MMOL/L (ref 99–110)
CHLORIDE BLD-SCNC: 100 MMOL/L (ref 99–110)
CHLORIDE BLD-SCNC: 105 MMOL/L (ref 99–110)
CHLORIDE BLD-SCNC: 93 MMOL/L (ref 99–110)
CHLORIDE BLD-SCNC: 97 MMOL/L (ref 99–110)
CHOLESTEROL, TOTAL: 143 MG/DL (ref 0–199)
CLARITY: ABNORMAL
CO2: 22 MMOL/L (ref 21–32)
CO2: 23 MMOL/L (ref 21–32)
CO2: 23 MMOL/L (ref 21–32)
CO2: 24 MMOL/L (ref 21–32)
CO2: 24 MMOL/L (ref 21–32)
COLOR: ABNORMAL
CREAT SERPL-MCNC: 1 MG/DL (ref 0.8–1.3)
CREAT SERPL-MCNC: 1.1 MG/DL (ref 0.8–1.3)
CREAT SERPL-MCNC: 1.2 MG/DL (ref 0.8–1.3)
CREAT SERPL-MCNC: 1.2 MG/DL (ref 0.8–1.3)
CREAT SERPL-MCNC: 1.3 MG/DL (ref 0.8–1.3)
EKG ATRIAL RATE: 107 BPM
EKG DIAGNOSIS: NORMAL
EKG P AXIS: 75 DEGREES
EKG P-R INTERVAL: 230 MS
EKG Q-T INTERVAL: 350 MS
EKG QRS DURATION: 68 MS
EKG QTC CALCULATION (BAZETT): 467 MS
EKG R AXIS: 45 DEGREES
EKG T AXIS: -62 DEGREES
EKG VENTRICULAR RATE: 107 BPM
EOSINOPHILS ABSOLUTE: 0.1 K/UL (ref 0–0.6)
EOSINOPHILS ABSOLUTE: 0.2 K/UL (ref 0–0.6)
EOSINOPHILS ABSOLUTE: 0.3 K/UL (ref 0–0.6)
EOSINOPHILS ABSOLUTE: 0.3 K/UL (ref 0–0.6)
EOSINOPHILS RELATIVE PERCENT: 1.7 %
EOSINOPHILS RELATIVE PERCENT: 2 %
EOSINOPHILS RELATIVE PERCENT: 3.4 %
EOSINOPHILS RELATIVE PERCENT: 4.6 %
ESTIMATED AVERAGE GLUCOSE: 105.4 MG/DL
ESTIMATED AVERAGE GLUCOSE: 223.1 MG/DL
GFR AFRICAN AMERICAN: >60
GFR NON-AFRICAN AMERICAN: 51
GFR NON-AFRICAN AMERICAN: 56
GFR NON-AFRICAN AMERICAN: 56
GFR NON-AFRICAN AMERICAN: >60
GFR NON-AFRICAN AMERICAN: >60
GLOBULIN: 2.9 G/DL
GLOBULIN: 3.6 G/DL
GLUCOSE BLD-MCNC: 124 MG/DL (ref 70–99)
GLUCOSE BLD-MCNC: 233 MG/DL (ref 70–99)
GLUCOSE BLD-MCNC: 255 MG/DL (ref 70–99)
GLUCOSE BLD-MCNC: 392 MG/DL (ref 70–99)
GLUCOSE BLD-MCNC: 604 MG/DL (ref 70–99)
GLUCOSE URINE: ABNORMAL MG/DL
HBA1C MFR BLD: 5.3 %
HBA1C MFR BLD: 9.4 %
HCT VFR BLD CALC: 30.4 % (ref 40.5–52.5)
HCT VFR BLD CALC: 30.4 % (ref 40.5–52.5)
HCT VFR BLD CALC: 32.8 % (ref 40.5–52.5)
HCT VFR BLD CALC: 34.2 % (ref 40.5–52.5)
HDLC SERPL-MCNC: 38 MG/DL (ref 40–60)
HEMOGLOBIN: 10 G/DL (ref 13.5–17.5)
HEMOGLOBIN: 10.6 G/DL (ref 13.5–17.5)
HEMOGLOBIN: 9.4 G/DL (ref 13.5–17.5)
HEMOGLOBIN: 9.5 G/DL (ref 13.5–17.5)
INR BLD: 1.08 (ref 0.86–1.14)
KETONES, URINE: ABNORMAL MG/DL
LDL CHOLESTEROL CALCULATED: 84 MG/DL
LEUKOCYTE ESTERASE, URINE: ABNORMAL
LYMPHOCYTES ABSOLUTE: 1.2 K/UL (ref 1–5.1)
LYMPHOCYTES ABSOLUTE: 1.3 K/UL (ref 1–5.1)
LYMPHOCYTES ABSOLUTE: 1.3 K/UL (ref 1–5.1)
LYMPHOCYTES ABSOLUTE: 1.6 K/UL (ref 1–5.1)
LYMPHOCYTES RELATIVE PERCENT: 14 %
LYMPHOCYTES RELATIVE PERCENT: 15.7 %
LYMPHOCYTES RELATIVE PERCENT: 18.2 %
LYMPHOCYTES RELATIVE PERCENT: 22.2 %
MCH RBC QN AUTO: 22.5 PG (ref 26–34)
MCH RBC QN AUTO: 23.3 PG (ref 26–34)
MCH RBC QN AUTO: 24 PG (ref 26–34)
MCH RBC QN AUTO: 24.8 PG (ref 26–34)
MCHC RBC AUTO-ENTMCNC: 30.3 G/DL (ref 31–36)
MCHC RBC AUTO-ENTMCNC: 30.9 G/DL (ref 31–36)
MCHC RBC AUTO-ENTMCNC: 31 G/DL (ref 31–36)
MCHC RBC AUTO-ENTMCNC: 31.3 G/DL (ref 31–36)
MCV RBC AUTO: 74 FL (ref 80–100)
MCV RBC AUTO: 74.5 FL (ref 80–100)
MCV RBC AUTO: 77.7 FL (ref 80–100)
MCV RBC AUTO: 80.1 FL (ref 80–100)
MICROSCOPIC EXAMINATION: YES
MONOCYTES ABSOLUTE: 0.6 K/UL (ref 0–1.3)
MONOCYTES ABSOLUTE: 0.8 K/UL (ref 0–1.3)
MONOCYTES ABSOLUTE: 0.9 K/UL (ref 0–1.3)
MONOCYTES ABSOLUTE: 1.1 K/UL (ref 0–1.3)
MONOCYTES RELATIVE PERCENT: 10 %
MONOCYTES RELATIVE PERCENT: 10.3 %
MONOCYTES RELATIVE PERCENT: 13.2 %
MONOCYTES RELATIVE PERCENT: 9.9 %
NEUTROPHILS ABSOLUTE: 3.4 K/UL (ref 1.7–7.7)
NEUTROPHILS ABSOLUTE: 5.7 K/UL (ref 1.7–7.7)
NEUTROPHILS ABSOLUTE: 5.8 K/UL (ref 1.7–7.7)
NEUTROPHILS ABSOLUTE: 6.5 K/UL (ref 1.7–7.7)
NEUTROPHILS RELATIVE PERCENT: 62.2 %
NEUTROPHILS RELATIVE PERCENT: 66.1 %
NEUTROPHILS RELATIVE PERCENT: 71.2 %
NEUTROPHILS RELATIVE PERCENT: 71.4 %
NITRITE, URINE: ABNORMAL
ORGANISM: ABNORMAL
PDW BLD-RTO: 16.1 % (ref 12.4–15.4)
PDW BLD-RTO: 16.6 % (ref 12.4–15.4)
PDW BLD-RTO: 16.9 % (ref 12.4–15.4)
PDW BLD-RTO: 18.4 % (ref 12.4–15.4)
PH UA: ABNORMAL (ref 5–8)
PLATELET # BLD: 205 K/UL (ref 135–450)
PLATELET # BLD: 231 K/UL (ref 135–450)
PLATELET # BLD: 272 K/UL (ref 135–450)
PLATELET # BLD: 313 K/UL (ref 135–450)
PMV BLD AUTO: 7.4 FL (ref 5–10.5)
PMV BLD AUTO: 7.5 FL (ref 5–10.5)
PMV BLD AUTO: 7.6 FL (ref 5–10.5)
PMV BLD AUTO: 7.7 FL (ref 5–10.5)
POTASSIUM SERPL-SCNC: 3.4 MMOL/L (ref 3.5–5.1)
POTASSIUM SERPL-SCNC: 4.1 MMOL/L (ref 3.5–5.1)
POTASSIUM SERPL-SCNC: 4.2 MMOL/L (ref 3.5–5.1)
POTASSIUM SERPL-SCNC: 4.2 MMOL/L (ref 3.5–5.1)
POTASSIUM SERPL-SCNC: 4.3 MMOL/L (ref 3.5–5.1)
PROTEIN UA: ABNORMAL MG/DL
PROTHROMBIN TIME: 12.3 SEC (ref 9.8–13)
RBC # BLD: 3.91 M/UL (ref 4.2–5.9)
RBC # BLD: 4.08 M/UL (ref 4.2–5.9)
RBC # BLD: 4.27 M/UL (ref 4.2–5.9)
RBC # BLD: 4.44 M/UL (ref 4.2–5.9)
RBC UA: >100 /HPF (ref 0–2)
SODIUM BLD-SCNC: 131 MMOL/L (ref 136–145)
SODIUM BLD-SCNC: 134 MMOL/L (ref 136–145)
SODIUM BLD-SCNC: 136 MMOL/L (ref 136–145)
SODIUM BLD-SCNC: 139 MMOL/L (ref 136–145)
SODIUM BLD-SCNC: 140 MMOL/L (ref 136–145)
SPECIFIC GRAVITY UA: 1.02 (ref 1–1.03)
TOTAL PROTEIN: 6.4 G/DL (ref 6.4–8.2)
TOTAL PROTEIN: 7 G/DL (ref 6.4–8.2)
TRIGL SERPL-MCNC: 106 MG/DL (ref 0–150)
TSH SERPL DL<=0.05 MIU/L-ACNC: 3.48 UIU/ML (ref 0.27–4.2)
URIC ACID, SERUM: 4.2 MG/DL (ref 3.5–7.2)
URINE CULTURE, ROUTINE: ABNORMAL
URINE TYPE: ABNORMAL
UROBILINOGEN, URINE: ABNORMAL E.U./DL
VLDLC SERPL CALC-MCNC: 21 MG/DL
WBC # BLD: 5.5 K/UL (ref 4–11)
WBC # BLD: 8.1 K/UL (ref 4–11)
WBC # BLD: 8.5 K/UL (ref 4–11)
WBC # BLD: 9.1 K/UL (ref 4–11)
WBC UA: ABNORMAL /HPF (ref 0–5)

## 2019-01-01 PROCEDURE — G8598 ASA/ANTIPLAT THER USED: HCPCS | Performed by: INTERNAL MEDICINE

## 2019-01-01 PROCEDURE — G8420 CALC BMI NORM PARAMETERS: HCPCS | Performed by: PHYSICIAN ASSISTANT

## 2019-01-01 PROCEDURE — 85610 PROTHROMBIN TIME: CPT

## 2019-01-01 PROCEDURE — 36415 COLL VENOUS BLD VENIPUNCTURE: CPT

## 2019-01-01 PROCEDURE — 87077 CULTURE AEROBIC IDENTIFY: CPT

## 2019-01-01 PROCEDURE — G8427 DOCREV CUR MEDS BY ELIG CLIN: HCPCS | Performed by: INTERNAL MEDICINE

## 2019-01-01 PROCEDURE — 51798 US URINE CAPACITY MEASURE: CPT

## 2019-01-01 PROCEDURE — 99213 OFFICE O/P EST LOW 20 MIN: CPT | Performed by: PHYSICIAN ASSISTANT

## 2019-01-01 PROCEDURE — 1123F ACP DISCUSS/DSCN MKR DOCD: CPT | Performed by: PHYSICIAN ASSISTANT

## 2019-01-01 PROCEDURE — 85025 COMPLETE CBC W/AUTO DIFF WBC: CPT

## 2019-01-01 PROCEDURE — 1036F TOBACCO NON-USER: CPT | Performed by: PHYSICIAN ASSISTANT

## 2019-01-01 PROCEDURE — 4040F PNEUMOC VAC/ADMIN/RCVD: CPT | Performed by: INTERNAL MEDICINE

## 2019-01-01 PROCEDURE — 1123F ACP DISCUSS/DSCN MKR DOCD: CPT | Performed by: INTERNAL MEDICINE

## 2019-01-01 PROCEDURE — 81001 URINALYSIS AUTO W/SCOPE: CPT

## 2019-01-01 PROCEDURE — 96374 THER/PROPH/DIAG INJ IV PUSH: CPT

## 2019-01-01 PROCEDURE — 93005 ELECTROCARDIOGRAM TRACING: CPT | Performed by: EMERGENCY MEDICINE

## 2019-01-01 PROCEDURE — G0439 PPPS, SUBSEQ VISIT: HCPCS | Performed by: INTERNAL MEDICINE

## 2019-01-01 PROCEDURE — 71045 X-RAY EXAM CHEST 1 VIEW: CPT

## 2019-01-01 PROCEDURE — G8427 DOCREV CUR MEDS BY ELIG CLIN: HCPCS | Performed by: PHYSICIAN ASSISTANT

## 2019-01-01 PROCEDURE — 80053 COMPREHEN METABOLIC PANEL: CPT

## 2019-01-01 PROCEDURE — 1036F TOBACCO NON-USER: CPT | Performed by: INTERNAL MEDICINE

## 2019-01-01 PROCEDURE — 99283 EMERGENCY DEPT VISIT LOW MDM: CPT

## 2019-01-01 PROCEDURE — G8484 FLU IMMUNIZE NO ADMIN: HCPCS | Performed by: INTERNAL MEDICINE

## 2019-01-01 PROCEDURE — G8598 ASA/ANTIPLAT THER USED: HCPCS | Performed by: PHYSICIAN ASSISTANT

## 2019-01-01 PROCEDURE — 99284 EMERGENCY DEPT VISIT MOD MDM: CPT

## 2019-01-01 PROCEDURE — G8420 CALC BMI NORM PARAMETERS: HCPCS | Performed by: INTERNAL MEDICINE

## 2019-01-01 PROCEDURE — 99214 OFFICE O/P EST MOD 30 MIN: CPT | Performed by: INTERNAL MEDICINE

## 2019-01-01 PROCEDURE — 6370000000 HC RX 637 (ALT 250 FOR IP): Performed by: EMERGENCY MEDICINE

## 2019-01-01 PROCEDURE — 4040F PNEUMOC VAC/ADMIN/RCVD: CPT | Performed by: PHYSICIAN ASSISTANT

## 2019-01-01 PROCEDURE — 87186 SC STD MICRODIL/AGAR DIL: CPT

## 2019-01-01 PROCEDURE — 99497 ADVNCD CARE PLAN 30 MIN: CPT | Performed by: INTERNAL MEDICINE

## 2019-01-01 PROCEDURE — 80048 BASIC METABOLIC PNL TOTAL CA: CPT

## 2019-01-01 PROCEDURE — 87086 URINE CULTURE/COLONY COUNT: CPT

## 2019-01-01 PROCEDURE — 93010 ELECTROCARDIOGRAM REPORT: CPT | Performed by: INTERNAL MEDICINE

## 2019-01-01 RX ORDER — DOXYCYCLINE HYCLATE 100 MG
100 TABLET ORAL 2 TIMES DAILY
Qty: 14 TABLET | Refills: 0 | OUTPATIENT
Start: 2019-01-01 | End: 2019-01-01

## 2019-01-01 RX ORDER — TRIAMTERENE AND HYDROCHLOROTHIAZIDE 37.5; 25 MG/1; MG/1
CAPSULE ORAL
Qty: 30 CAPSULE | Refills: 2 | Status: SHIPPED | OUTPATIENT
Start: 2019-01-01 | End: 2019-01-01 | Stop reason: SDUPTHER

## 2019-01-01 RX ORDER — GLIMEPIRIDE 2 MG/1
TABLET ORAL
Qty: 60 TABLET | Refills: 1 | Status: SHIPPED | OUTPATIENT
Start: 2019-01-01 | End: 2019-01-01 | Stop reason: SDUPTHER

## 2019-01-01 RX ORDER — TRIAMTERENE AND HYDROCHLOROTHIAZIDE 37.5; 25 MG/1; MG/1
CAPSULE ORAL
Qty: 30 CAPSULE | Refills: 2 | Status: ON HOLD | OUTPATIENT
Start: 2019-01-01 | End: 2020-01-01 | Stop reason: HOSPADM

## 2019-01-01 RX ORDER — BENZONATATE 200 MG/1
200 CAPSULE ORAL 3 TIMES DAILY PRN
Qty: 30 CAPSULE | Refills: 0 | Status: SHIPPED | OUTPATIENT
Start: 2019-01-01 | End: 2019-01-01

## 2019-01-01 RX ORDER — ROPINIROLE 1 MG/1
TABLET, FILM COATED ORAL
Qty: 30 TABLET | Refills: 2 | Status: SHIPPED | OUTPATIENT
Start: 2019-01-01 | End: 2019-01-01 | Stop reason: SDUPTHER

## 2019-01-01 RX ORDER — GLIMEPIRIDE 2 MG/1
TABLET ORAL
Qty: 60 TABLET | Refills: 2 | Status: SHIPPED | OUTPATIENT
Start: 2019-01-01 | End: 2020-01-01 | Stop reason: SDUPTHER

## 2019-01-01 RX ORDER — AZITHROMYCIN 250 MG/1
250 TABLET, FILM COATED ORAL SEE ADMIN INSTRUCTIONS
Qty: 6 TABLET | Refills: 0 | Status: SHIPPED | OUTPATIENT
Start: 2019-01-01 | End: 2019-01-01

## 2019-01-01 RX ORDER — ROPINIROLE 1 MG/1
TABLET, FILM COATED ORAL
Qty: 30 TABLET | Refills: 2 | Status: SHIPPED | OUTPATIENT
Start: 2019-01-01 | End: 2020-01-01

## 2019-01-01 RX ORDER — ISOSORBIDE DINITRATE 10 MG/1
TABLET ORAL
Qty: 90 TABLET | Refills: 2 | Status: SHIPPED | OUTPATIENT
Start: 2019-01-01 | End: 2019-01-01 | Stop reason: SDUPTHER

## 2019-01-01 RX ORDER — ISOSORBIDE DINITRATE 10 MG/1
TABLET ORAL
Qty: 90 TABLET | Refills: 2 | Status: SHIPPED | OUTPATIENT
Start: 2019-01-01 | End: 2020-01-01

## 2019-01-01 RX ORDER — BENZONATATE 200 MG/1
200 CAPSULE ORAL 3 TIMES DAILY PRN
Qty: 30 CAPSULE | Refills: 0 | OUTPATIENT
Start: 2019-01-01 | End: 2019-01-01

## 2019-01-01 RX ORDER — DOXYCYCLINE HYCLATE 100 MG
100 TABLET ORAL 2 TIMES DAILY
Qty: 14 TABLET | Refills: 0 | Status: SHIPPED | OUTPATIENT
Start: 2019-01-01 | End: 2019-01-01

## 2019-01-01 RX ADMIN — INSULIN HUMAN 10 UNITS: 100 INJECTION, SOLUTION PARENTERAL at 21:38

## 2019-01-01 ASSESSMENT — ENCOUNTER SYMPTOMS
COUGH: 1
VOMITING: 0
SHORTNESS OF BREATH: 0
ABDOMINAL PAIN: 0
WHEEZING: 0
SHORTNESS OF BREATH: 0
NAUSEA: 0
CONSTIPATION: 0
NAUSEA: 0
SORE THROAT: 0
RHINORRHEA: 1
BACK PAIN: 0
ABDOMINAL PAIN: 0
RHINORRHEA: 0
DIARRHEA: 0
VOMITING: 0

## 2019-01-01 ASSESSMENT — PATIENT HEALTH QUESTIONNAIRE - PHQ9: SUM OF ALL RESPONSES TO PHQ QUESTIONS 1-9: 3

## 2019-01-01 ASSESSMENT — LIFESTYLE VARIABLES: HOW OFTEN DO YOU HAVE A DRINK CONTAINING ALCOHOL: 0

## 2019-01-02 DIAGNOSIS — M17.0 PRIMARY OSTEOARTHRITIS OF BOTH KNEES: ICD-10-CM

## 2019-01-03 RX ORDER — HYDRALAZINE HYDROCHLORIDE 25 MG/1
TABLET, FILM COATED ORAL
Qty: 93 TABLET | Refills: 0 | Status: SHIPPED | OUTPATIENT
Start: 2019-01-03 | End: 2019-02-04

## 2019-01-03 RX ORDER — ROPINIROLE 1 MG/1
TABLET, FILM COATED ORAL
Qty: 31 TABLET | Refills: 0 | Status: SHIPPED | OUTPATIENT
Start: 2019-01-03 | End: 2019-02-01 | Stop reason: SDUPTHER

## 2019-01-03 RX ORDER — ISOSORBIDE DINITRATE 10 MG/1
TABLET ORAL
Qty: 93 TABLET | Refills: 0 | Status: SHIPPED | OUTPATIENT
Start: 2019-01-03 | End: 2019-02-01 | Stop reason: SDUPTHER

## 2019-01-03 RX ORDER — TRIAMTERENE AND HYDROCHLOROTHIAZIDE 37.5; 25 MG/1; MG/1
CAPSULE ORAL
Qty: 31 CAPSULE | Refills: 0 | Status: SHIPPED | OUTPATIENT
Start: 2019-01-03 | End: 2019-02-01 | Stop reason: SDUPTHER

## 2019-01-03 RX ORDER — ATORVASTATIN CALCIUM 10 MG/1
TABLET, FILM COATED ORAL
Qty: 31 TABLET | Refills: 0 | Status: SHIPPED | OUTPATIENT
Start: 2019-01-03 | End: 2019-02-01 | Stop reason: SDUPTHER

## 2019-01-03 RX ORDER — GLIMEPIRIDE 2 MG/1
TABLET ORAL
Qty: 62 TABLET | Refills: 0 | Status: SHIPPED | OUTPATIENT
Start: 2019-01-03 | End: 2019-02-01 | Stop reason: SDUPTHER

## 2019-01-03 RX ORDER — TRAMADOL HYDROCHLORIDE 50 MG/1
TABLET ORAL
Qty: 120 TABLET | Refills: 0 | Status: SHIPPED | OUTPATIENT
Start: 2019-01-03 | End: 2019-02-01 | Stop reason: SDUPTHER

## 2019-01-03 RX ORDER — ATENOLOL 50 MG/1
TABLET ORAL
Qty: 31 TABLET | Refills: 0 | Status: SHIPPED | OUTPATIENT
Start: 2019-01-03 | End: 2019-02-01 | Stop reason: SDUPTHER

## 2019-02-01 DIAGNOSIS — M17.0 PRIMARY OSTEOARTHRITIS OF BOTH KNEES: ICD-10-CM

## 2019-02-04 RX ORDER — TRIAMTERENE AND HYDROCHLOROTHIAZIDE 37.5; 25 MG/1; MG/1
CAPSULE ORAL
Qty: 31 CAPSULE | Refills: 0 | Status: SHIPPED | OUTPATIENT
Start: 2019-02-04 | End: 2019-03-02 | Stop reason: SDUPTHER

## 2019-02-04 RX ORDER — TRAMADOL HYDROCHLORIDE 50 MG/1
TABLET ORAL
Qty: 120 TABLET | Refills: 0 | Status: SHIPPED | OUTPATIENT
Start: 2019-02-04 | End: 2019-03-04

## 2019-02-04 RX ORDER — GLIMEPIRIDE 2 MG/1
TABLET ORAL
Qty: 62 TABLET | Refills: 0 | Status: SHIPPED | OUTPATIENT
Start: 2019-02-04 | End: 2019-03-02 | Stop reason: SDUPTHER

## 2019-02-04 RX ORDER — ATENOLOL 50 MG/1
TABLET ORAL
Qty: 31 TABLET | Refills: 0 | Status: SHIPPED | OUTPATIENT
Start: 2019-02-04 | End: 2019-03-02 | Stop reason: SDUPTHER

## 2019-02-04 RX ORDER — ATORVASTATIN CALCIUM 10 MG/1
TABLET, FILM COATED ORAL
Qty: 31 TABLET | Refills: 0 | Status: SHIPPED | OUTPATIENT
Start: 2019-02-04 | End: 2019-03-02 | Stop reason: SDUPTHER

## 2019-02-04 RX ORDER — ROPINIROLE 1 MG/1
TABLET, FILM COATED ORAL
Qty: 31 TABLET | Refills: 0 | Status: SHIPPED | OUTPATIENT
Start: 2019-02-04 | End: 2019-03-02 | Stop reason: SDUPTHER

## 2019-02-04 RX ORDER — HYDRALAZINE HYDROCHLORIDE 25 MG/1
TABLET, FILM COATED ORAL
Qty: 93 TABLET | Refills: 0 | Status: SHIPPED | OUTPATIENT
Start: 2019-02-04 | End: 2019-03-04

## 2019-02-04 RX ORDER — ISOSORBIDE DINITRATE 10 MG/1
TABLET ORAL
Qty: 93 TABLET | Refills: 0 | Status: SHIPPED | OUTPATIENT
Start: 2019-02-04 | End: 2019-03-02 | Stop reason: SDUPTHER

## 2019-02-20 ENCOUNTER — OFFICE VISIT (OUTPATIENT)
Dept: INTERNAL MEDICINE CLINIC | Age: 84
End: 2019-02-20

## 2019-02-20 VITALS
RESPIRATION RATE: 14 BRPM | WEIGHT: 166 LBS | BODY MASS INDEX: 27.66 KG/M2 | HEIGHT: 65 IN | DIASTOLIC BLOOD PRESSURE: 80 MMHG | SYSTOLIC BLOOD PRESSURE: 120 MMHG | HEART RATE: 70 BPM

## 2019-02-20 DIAGNOSIS — I25.10 CORONARY ARTERY DISEASE INVOLVING NATIVE HEART WITHOUT ANGINA PECTORIS, UNSPECIFIED VESSEL OR LESION TYPE: ICD-10-CM

## 2019-02-20 DIAGNOSIS — E11.69 HYPERLIPIDEMIA ASSOCIATED WITH TYPE 2 DIABETES MELLITUS (HCC): ICD-10-CM

## 2019-02-20 DIAGNOSIS — N18.30 CHRONIC KIDNEY DISEASE (CKD), STAGE III (MODERATE) (HCC): ICD-10-CM

## 2019-02-20 DIAGNOSIS — M17.0 PRIMARY OSTEOARTHRITIS OF BOTH KNEES: ICD-10-CM

## 2019-02-20 DIAGNOSIS — E11.9 TYPE 2 DIABETES MELLITUS WITHOUT COMPLICATION, WITHOUT LONG-TERM CURRENT USE OF INSULIN (HCC): Primary | ICD-10-CM

## 2019-02-20 DIAGNOSIS — E11.9 CONTROLLED TYPE 2 DIABETES MELLITUS WITHOUT COMPLICATION, WITHOUT LONG-TERM CURRENT USE OF INSULIN (HCC): ICD-10-CM

## 2019-02-20 DIAGNOSIS — E78.5 HYPERLIPIDEMIA ASSOCIATED WITH TYPE 2 DIABETES MELLITUS (HCC): ICD-10-CM

## 2019-02-20 DIAGNOSIS — I10 ESSENTIAL HYPERTENSION: ICD-10-CM

## 2019-02-20 PROCEDURE — G8484 FLU IMMUNIZE NO ADMIN: HCPCS | Performed by: INTERNAL MEDICINE

## 2019-02-20 PROCEDURE — G8599 NO ASA/ANTIPLAT THER USE RNG: HCPCS | Performed by: INTERNAL MEDICINE

## 2019-02-20 PROCEDURE — 1101F PT FALLS ASSESS-DOCD LE1/YR: CPT | Performed by: INTERNAL MEDICINE

## 2019-02-20 PROCEDURE — 99214 OFFICE O/P EST MOD 30 MIN: CPT | Performed by: INTERNAL MEDICINE

## 2019-02-20 PROCEDURE — G8417 CALC BMI ABV UP PARAM F/U: HCPCS | Performed by: INTERNAL MEDICINE

## 2019-02-20 PROCEDURE — G8427 DOCREV CUR MEDS BY ELIG CLIN: HCPCS | Performed by: INTERNAL MEDICINE

## 2019-02-20 PROCEDURE — 4040F PNEUMOC VAC/ADMIN/RCVD: CPT | Performed by: INTERNAL MEDICINE

## 2019-02-20 PROCEDURE — 1036F TOBACCO NON-USER: CPT | Performed by: INTERNAL MEDICINE

## 2019-02-20 PROCEDURE — 1123F ACP DISCUSS/DSCN MKR DOCD: CPT | Performed by: INTERNAL MEDICINE

## 2019-02-20 ASSESSMENT — ENCOUNTER SYMPTOMS
NAUSEA: 0
ABDOMINAL PAIN: 0
BACK PAIN: 0
RHINORRHEA: 0
WHEEZING: 0
SHORTNESS OF BREATH: 0
VOMITING: 0

## 2019-03-02 DIAGNOSIS — M17.0 PRIMARY OSTEOARTHRITIS OF BOTH KNEES: Primary | ICD-10-CM

## 2019-03-04 RX ORDER — ATORVASTATIN CALCIUM 10 MG/1
TABLET, FILM COATED ORAL
Qty: 31 TABLET | Refills: 0 | Status: SHIPPED | OUTPATIENT
Start: 2019-03-04 | End: 2019-04-01 | Stop reason: SDUPTHER

## 2019-03-04 RX ORDER — HYDRALAZINE HYDROCHLORIDE 25 MG/1
TABLET, FILM COATED ORAL
Qty: 93 TABLET | Refills: 0 | Status: SHIPPED | OUTPATIENT
Start: 2019-03-04 | End: 2019-04-02

## 2019-03-04 RX ORDER — ISOSORBIDE DINITRATE 10 MG/1
TABLET ORAL
Qty: 93 TABLET | Refills: 0 | Status: SHIPPED | OUTPATIENT
Start: 2019-03-04 | End: 2019-04-01 | Stop reason: SDUPTHER

## 2019-03-04 RX ORDER — TRAMADOL HYDROCHLORIDE 50 MG/1
TABLET ORAL
Qty: 120 TABLET | Refills: 0 | Status: SHIPPED | OUTPATIENT
Start: 2019-03-04 | End: 2019-04-02

## 2019-03-04 RX ORDER — ROPINIROLE 1 MG/1
TABLET, FILM COATED ORAL
Qty: 31 TABLET | Refills: 0 | Status: SHIPPED | OUTPATIENT
Start: 2019-03-04 | End: 2019-04-01 | Stop reason: SDUPTHER

## 2019-03-04 RX ORDER — ATENOLOL 50 MG/1
TABLET ORAL
Qty: 31 TABLET | Refills: 0 | Status: SHIPPED | OUTPATIENT
Start: 2019-03-04 | End: 2019-04-01 | Stop reason: SDUPTHER

## 2019-03-04 RX ORDER — GLIMEPIRIDE 2 MG/1
TABLET ORAL
Qty: 62 TABLET | Refills: 0 | Status: SHIPPED | OUTPATIENT
Start: 2019-03-04 | End: 2019-04-01 | Stop reason: SDUPTHER

## 2019-03-04 RX ORDER — TRIAMTERENE AND HYDROCHLOROTHIAZIDE 37.5; 25 MG/1; MG/1
CAPSULE ORAL
Qty: 31 CAPSULE | Refills: 0 | Status: SHIPPED | OUTPATIENT
Start: 2019-03-04 | End: 2019-04-01 | Stop reason: SDUPTHER

## 2019-04-01 DIAGNOSIS — M19.90 ARTHRITIS: Primary | ICD-10-CM

## 2019-04-02 RX ORDER — ATORVASTATIN CALCIUM 10 MG/1
TABLET, FILM COATED ORAL
Qty: 30 TABLET | Refills: 2 | Status: SHIPPED | OUTPATIENT
Start: 2019-04-02 | End: 2019-06-03 | Stop reason: SDUPTHER

## 2019-04-02 RX ORDER — ATENOLOL 50 MG/1
TABLET ORAL
Qty: 30 TABLET | Refills: 2 | Status: ON HOLD | OUTPATIENT
Start: 2019-04-02 | End: 2019-06-17 | Stop reason: HOSPADM

## 2019-04-02 RX ORDER — GLIMEPIRIDE 2 MG/1
TABLET ORAL
Qty: 60 TABLET | Refills: 2 | Status: SHIPPED | OUTPATIENT
Start: 2019-04-02 | End: 2019-06-03 | Stop reason: SDUPTHER

## 2019-04-02 RX ORDER — TRAMADOL HYDROCHLORIDE 50 MG/1
TABLET ORAL
Qty: 120 TABLET | Refills: 0 | Status: SHIPPED | OUTPATIENT
Start: 2019-04-03 | End: 2019-05-03

## 2019-04-02 RX ORDER — ISOSORBIDE DINITRATE 10 MG/1
TABLET ORAL
Qty: 90 TABLET | Refills: 2 | Status: SHIPPED | OUTPATIENT
Start: 2019-04-02 | End: 2019-01-01 | Stop reason: SDUPTHER

## 2019-04-02 RX ORDER — ROPINIROLE 1 MG/1
TABLET, FILM COATED ORAL
Qty: 30 TABLET | Refills: 2 | Status: SHIPPED | OUTPATIENT
Start: 2019-04-02 | End: 2019-01-01 | Stop reason: SDUPTHER

## 2019-04-02 RX ORDER — TRIAMTERENE AND HYDROCHLOROTHIAZIDE 37.5; 25 MG/1; MG/1
CAPSULE ORAL
Qty: 30 CAPSULE | Refills: 2 | Status: ON HOLD | OUTPATIENT
Start: 2019-04-02 | End: 2019-06-17 | Stop reason: HOSPADM

## 2019-04-02 RX ORDER — HYDRALAZINE HYDROCHLORIDE 25 MG/1
TABLET, FILM COATED ORAL
Qty: 90 TABLET | Refills: 2 | Status: ON HOLD | OUTPATIENT
Start: 2019-04-02 | End: 2019-06-17 | Stop reason: HOSPADM

## 2019-05-06 DIAGNOSIS — M17.0 PRIMARY OSTEOARTHRITIS OF BOTH KNEES: Primary | ICD-10-CM

## 2019-05-06 RX ORDER — TRAMADOL HYDROCHLORIDE 50 MG/1
TABLET ORAL
Qty: 120 TABLET | Refills: 0 | Status: SHIPPED | OUTPATIENT
Start: 2019-05-06 | End: 2019-06-05

## 2019-06-01 DIAGNOSIS — M17.0 PRIMARY OSTEOARTHRITIS OF BOTH KNEES: Primary | ICD-10-CM

## 2019-06-03 RX ORDER — GLIMEPIRIDE 2 MG/1
TABLET ORAL
Qty: 180 TABLET | Refills: 0 | Status: SHIPPED | OUTPATIENT
Start: 2019-06-03 | End: 2019-06-12 | Stop reason: ALTCHOICE

## 2019-06-03 RX ORDER — TRAMADOL HYDROCHLORIDE 50 MG/1
TABLET ORAL
Qty: 120 TABLET | Refills: 0 | Status: ON HOLD | OUTPATIENT
Start: 2019-06-06 | End: 2019-06-17 | Stop reason: SDUPTHER

## 2019-06-03 RX ORDER — ATORVASTATIN CALCIUM 10 MG/1
TABLET, FILM COATED ORAL
Qty: 90 TABLET | Refills: 0 | Status: ON HOLD | OUTPATIENT
Start: 2019-06-03 | End: 2019-06-17 | Stop reason: HOSPADM

## 2019-06-12 ENCOUNTER — OFFICE VISIT (OUTPATIENT)
Dept: INTERNAL MEDICINE CLINIC | Age: 84
End: 2019-06-12

## 2019-06-12 VITALS
RESPIRATION RATE: 14 BRPM | DIASTOLIC BLOOD PRESSURE: 70 MMHG | BODY MASS INDEX: 23.99 KG/M2 | SYSTOLIC BLOOD PRESSURE: 120 MMHG | WEIGHT: 144 LBS | HEART RATE: 70 BPM | HEIGHT: 65 IN

## 2019-06-12 DIAGNOSIS — I10 ESSENTIAL HYPERTENSION: ICD-10-CM

## 2019-06-12 DIAGNOSIS — G25.81 RESTLESS LEGS SYNDROME: ICD-10-CM

## 2019-06-12 DIAGNOSIS — E78.5 HYPERLIPIDEMIA ASSOCIATED WITH TYPE 2 DIABETES MELLITUS (HCC): ICD-10-CM

## 2019-06-12 DIAGNOSIS — E11.9 TYPE 2 DIABETES MELLITUS WITHOUT COMPLICATION, WITHOUT LONG-TERM CURRENT USE OF INSULIN (HCC): Primary | ICD-10-CM

## 2019-06-12 DIAGNOSIS — N18.30 CHRONIC KIDNEY DISEASE (CKD), STAGE III (MODERATE) (HCC): ICD-10-CM

## 2019-06-12 DIAGNOSIS — E11.69 HYPERLIPIDEMIA ASSOCIATED WITH TYPE 2 DIABETES MELLITUS (HCC): ICD-10-CM

## 2019-06-12 DIAGNOSIS — I25.10 CORONARY ARTERY DISEASE INVOLVING NATIVE HEART WITHOUT ANGINA PECTORIS, UNSPECIFIED VESSEL OR LESION TYPE: ICD-10-CM

## 2019-06-12 DIAGNOSIS — E11.9 TYPE 2 DIABETES MELLITUS WITHOUT COMPLICATION, WITHOUT LONG-TERM CURRENT USE OF INSULIN (HCC): ICD-10-CM

## 2019-06-12 LAB
A/G RATIO: 1 (ref 1.1–2.2)
ALBUMIN SERPL-MCNC: 3.1 G/DL (ref 3.4–5)
ALP BLD-CCNC: 406 U/L (ref 40–129)
ALT SERPL-CCNC: 66 U/L (ref 10–40)
ANION GAP SERPL CALCULATED.3IONS-SCNC: 15 MMOL/L (ref 3–16)
AST SERPL-CCNC: 142 U/L (ref 15–37)
BASOPHILS ABSOLUTE: 0.1 K/UL (ref 0–0.2)
BASOPHILS RELATIVE PERCENT: 0.6 %
BILIRUB SERPL-MCNC: 2.3 MG/DL (ref 0–1)
BILIRUBIN, POC: NORMAL
BLOOD URINE, POC: NORMAL
BUN BLDV-MCNC: 26 MG/DL (ref 7–20)
CALCIUM SERPL-MCNC: 8.7 MG/DL (ref 8.3–10.6)
CHLORIDE BLD-SCNC: 102 MMOL/L (ref 99–110)
CHOLESTEROL, TOTAL: 87 MG/DL (ref 0–199)
CLARITY, POC: NORMAL
CO2: 23 MMOL/L (ref 21–32)
COLOR, POC: NORMAL
CREAT SERPL-MCNC: 1.5 MG/DL (ref 0.8–1.3)
CREATININE URINE: 111 MG/DL (ref 39–259)
EOSINOPHILS ABSOLUTE: 0.1 K/UL (ref 0–0.6)
EOSINOPHILS RELATIVE PERCENT: 0.5 %
GFR AFRICAN AMERICAN: 53
GFR NON-AFRICAN AMERICAN: 44
GLOBULIN: 3.1 G/DL
GLUCOSE BLD-MCNC: 71 MG/DL (ref 70–99)
GLUCOSE URINE, POC: NORMAL
HCT VFR BLD CALC: 41.8 % (ref 40.5–52.5)
HDLC SERPL-MCNC: 44 MG/DL (ref 40–60)
HEMOGLOBIN: 13.6 G/DL (ref 13.5–17.5)
KETONES, POC: NORMAL
LDL CHOLESTEROL CALCULATED: 30 MG/DL
LEUKOCYTE EST, POC: NORMAL
LYMPHOCYTES ABSOLUTE: 1.3 K/UL (ref 1–5.1)
LYMPHOCYTES RELATIVE PERCENT: 9.6 %
MCH RBC QN AUTO: 30.6 PG (ref 26–34)
MCHC RBC AUTO-ENTMCNC: 32.5 G/DL (ref 31–36)
MCV RBC AUTO: 94.2 FL (ref 80–100)
MICROALBUMIN UR-MCNC: <1.2 MG/DL
MICROALBUMIN/CREAT UR-RTO: NORMAL MG/G (ref 0–30)
MONOCYTES ABSOLUTE: 1 K/UL (ref 0–1.3)
MONOCYTES RELATIVE PERCENT: 7.8 %
NEUTROPHILS ABSOLUTE: 10.9 K/UL (ref 1.7–7.7)
NEUTROPHILS RELATIVE PERCENT: 81.5 %
NITRITE, POC: POSITIVE
PDW BLD-RTO: 16.5 % (ref 12.4–15.4)
PH, POC: NORMAL
PLATELET # BLD: 234 K/UL (ref 135–450)
PMV BLD AUTO: 8.4 FL (ref 5–10.5)
POTASSIUM SERPL-SCNC: 4.6 MMOL/L (ref 3.5–5.1)
PROTEIN, POC: NORMAL
RBC # BLD: 4.43 M/UL (ref 4.2–5.9)
SODIUM BLD-SCNC: 140 MMOL/L (ref 136–145)
SPECIFIC GRAVITY, POC: NORMAL
TOTAL PROTEIN: 6.2 G/DL (ref 6.4–8.2)
TRIGL SERPL-MCNC: 63 MG/DL (ref 0–150)
URIC ACID, SERUM: 7.1 MG/DL (ref 3.5–7.2)
UROBILINOGEN, POC: NORMAL
VITAMIN B-12: 898 PG/ML (ref 211–911)
VLDLC SERPL CALC-MCNC: 13 MG/DL
WBC # BLD: 13.3 K/UL (ref 4–11)

## 2019-06-12 PROCEDURE — 1036F TOBACCO NON-USER: CPT | Performed by: INTERNAL MEDICINE

## 2019-06-12 PROCEDURE — G8599 NO ASA/ANTIPLAT THER USE RNG: HCPCS | Performed by: INTERNAL MEDICINE

## 2019-06-12 PROCEDURE — 1123F ACP DISCUSS/DSCN MKR DOCD: CPT | Performed by: INTERNAL MEDICINE

## 2019-06-12 PROCEDURE — 4040F PNEUMOC VAC/ADMIN/RCVD: CPT | Performed by: INTERNAL MEDICINE

## 2019-06-12 PROCEDURE — G8427 DOCREV CUR MEDS BY ELIG CLIN: HCPCS | Performed by: INTERNAL MEDICINE

## 2019-06-12 PROCEDURE — 99214 OFFICE O/P EST MOD 30 MIN: CPT | Performed by: INTERNAL MEDICINE

## 2019-06-12 PROCEDURE — 81002 URINALYSIS NONAUTO W/O SCOPE: CPT | Performed by: INTERNAL MEDICINE

## 2019-06-12 PROCEDURE — G8420 CALC BMI NORM PARAMETERS: HCPCS | Performed by: INTERNAL MEDICINE

## 2019-06-12 ASSESSMENT — ENCOUNTER SYMPTOMS
ABDOMINAL PAIN: 0
BACK PAIN: 0
RHINORRHEA: 0
VOMITING: 0
SHORTNESS OF BREATH: 0
NAUSEA: 0
WHEEZING: 0

## 2019-06-12 NOTE — PROGRESS NOTES
Subjective:      Patient ID: Jewell Sesay is a 80 y.o. male. HPI  Patient is here for follow up of CAD, diabetes, hypertension, hyperlipidemia, OA, renal failure and restless legs syndrome. Patient presents for routine coronary artery disease follow-up. CAD- Current symptoms: non-existent Pain radiation: none Patient also complains of no other symptoms. Doing well. Patient's does not check his sugars but his DM is controlled. Patient does not have polydipsia and polyuria. No numbness or tingling. Last A1C was 5.5. He has lost 22 lbs. His son says he is not eating well and drinks a lot of water. He has become more fussy. CT abd and chest recently was negative. Patient's BP is controlled on current medications. No chest pain or dyspnea. His OA is progressive. He uses a wheelchair. His restless legs is stable. Current Outpatient Medications:     traMADol (ULTRAM) 50 MG tablet, TAKE 1 TABLET EVERY 6 HOURS AS NEEDED, Disp: 120 tablet, Rfl: 0    atorvastatin (LIPITOR) 10 MG tablet, TAKE ONE TABLET AT BEDTIME, Disp: 90 tablet, Rfl: 0    atenolol (TENORMIN) 50 MG tablet, TAKE (1) TABLET DAILY, Disp: 30 tablet, Rfl: 2    isosorbide dinitrate (ISORDIL) 10 MG tablet, TAKE 1 TABLET THREE TIMES DAILY, Disp: 90 tablet, Rfl: 2    rOPINIRole (REQUIP) 1 MG tablet, TAKE ONE TABLET AT BEDTIME, Disp: 30 tablet, Rfl: 2    hydrALAZINE (APRESOLINE) 25 MG tablet, TAKE 1 TABLET THREE TIMES DAILY, Disp: 90 tablet, Rfl: 2    triamterene-hydrochlorothiazide (DYAZIDE) 37.5-25 MG per capsule, TAKE 1 CAPSULE ONCE DAILY, Disp: 30 capsule, Rfl: 2    senna (SENOKOT) 8.6 MG tablet, Take 2 tablets by mouth 2 times daily, Disp: 120 tablet, Rfl: 11    olopatadine (PATANOL) 0.1 % ophthalmic solution, PLACE 1 DROP INTO EACH EYE TWICE DAILY, Disp: 5 mL, Rfl: 0    Misc. Devices ([address]) MISC, Use for ambulation, Disp: 1 each, Rfl: 0    aspirin 81 MG tablet, Take 1 tablet by mouth daily. , Disp: 30 tablet, Rfl:           Review of Systems   Constitutional: Positive for unexpected weight change. Negative for activity change and appetite change. HENT: Negative for postnasal drip and rhinorrhea. Respiratory: Negative for shortness of breath and wheezing. Cardiovascular: Negative for chest pain, palpitations and leg swelling. Gastrointestinal: Negative for abdominal pain, nausea and vomiting. Genitourinary: Negative for difficulty urinating and frequency. Musculoskeletal: Negative for back pain and joint swelling. Skin: Negative for rash. Neurological: Negative for light-headedness. Psychiatric/Behavioral: Negative for sleep disturbance. There are no changes to past medical history, family history, social history or review of systems(except as noted in the history section) since prior note (all reviewed with patient). /70 (Site: Right Upper Arm, Position: Sitting, Cuff Size: Medium Adult)   Pulse 70   Resp 14   Ht 5' 5\" (1.651 m)   Wt 144 lb (65.3 kg)   BMI 23.96 kg/m²      Objective:   Physical Exam   Constitutional: He is oriented to person, place, and time. He appears well-developed and well-nourished. HENT:   Head: Normocephalic. Eyes: Pupils are equal, round, and reactive to light. Conjunctivae and EOM are normal.   Neck: Trachea normal and normal range of motion. Neck supple. No JVD present. Carotid bruit is not present. No thyroid mass and no thyromegaly present. Cardiovascular: Normal rate, regular rhythm and normal heart sounds. Exam reveals no gallop. No murmur heard. Pulmonary/Chest: Effort normal and breath sounds normal. No respiratory distress. He has no wheezes. He has no rales. Abdominal: Soft. Bowel sounds are normal. He exhibits no distension and no mass. There is no splenomegaly or hepatomegaly. There is no tenderness. Musculoskeletal: He exhibits no edema. Lymphadenopathy:     He has no cervical adenopathy.    Neurological: He is alert and oriented to person, place, and time. Skin: Skin is warm and dry. No rash noted. Psychiatric: He has a normal mood and affect. His behavior is normal. Judgment and thought content normal.       Assessment:       Diagnosis Orders   1. Type 2 diabetes mellitus without complication, without long-term current use of insulin ([facility])  Comprehensive Metabolic Panel    CBC Auto Differential    Hemoglobin A1C    Lipid Panel    Microalbumin / Creatinine Urine Ratio    POCT Urinalysis no Micro    Uric Acid    Vitamin B12   2. Hyperlipidemia associated with type 2 diabetes mellitus ([facility] [address]     3. Essential hypertension     4. Coronary artery disease involving native heart without angina pectoris, unspecified vessel or lesion type     5. Chronic kidney disease (CKD), stage III (moderate) ([facility])     6. Restless legs syndrome             Plan:       # DM type 2: well controlled. No issues. Doing well. Stop Amaryl. # CAD: no chest pain. No issues. #  Hypertension: is well controlled. #  CKD: stable. It is stage 3. #  Allergies:  Uses Patanol drops. #  OA: It is progressive. Pain controlled. #  Hyperlipidemia: at goal.  #  Weight loss. ? Poor appetite. Neg work up recently. Limited prognosis given advance age. Decrease calorie intake. Exercise,weight loss recommended. The current medical regimen is effective;  continue present plan and medications. See orders.

## 2019-06-13 ENCOUNTER — TELEPHONE (OUTPATIENT)
Dept: INTERNAL MEDICINE CLINIC | Age: 84
End: 2019-06-13

## 2019-06-13 DIAGNOSIS — R79.89 ELEVATED LFTS: Primary | ICD-10-CM

## 2019-06-13 LAB
ESTIMATED AVERAGE GLUCOSE: 125.5 MG/DL
HBA1C MFR BLD: 6 %

## 2019-06-14 ENCOUNTER — APPOINTMENT (OUTPATIENT)
Dept: GENERAL RADIOLOGY | Age: 84
DRG: 637 | End: 2019-06-14
Payer: MEDICARE

## 2019-06-14 ENCOUNTER — APPOINTMENT (OUTPATIENT)
Dept: CT IMAGING | Age: 84
DRG: 637 | End: 2019-06-14
Payer: MEDICARE

## 2019-06-14 ENCOUNTER — HOSPITAL ENCOUNTER (INPATIENT)
Age: 84
LOS: 3 days | Discharge: NURSING, REHAB OR LONG-TERM CARE | DRG: 637 | End: 2019-06-17
Attending: FAMILY MEDICINE | Admitting: INTERNAL MEDICINE
Payer: MEDICARE

## 2019-06-14 DIAGNOSIS — E16.2 HYPOGLYCEMIA: ICD-10-CM

## 2019-06-14 DIAGNOSIS — R79.89 ELEVATED LFTS: ICD-10-CM

## 2019-06-14 DIAGNOSIS — R41.0 CONFUSION: Primary | ICD-10-CM

## 2019-06-14 DIAGNOSIS — M17.0 PRIMARY OSTEOARTHRITIS OF BOTH KNEES: ICD-10-CM

## 2019-06-14 DIAGNOSIS — T68.XXXA HYPOTHERMIA, INITIAL ENCOUNTER: ICD-10-CM

## 2019-06-14 PROBLEM — G93.40 ACUTE ENCEPHALOPATHY: Status: ACTIVE | Noted: 2019-06-14

## 2019-06-14 LAB
A/G RATIO: 0.8 (ref 1.1–2.2)
ALBUMIN SERPL-MCNC: 3.1 G/DL (ref 3.4–5)
ALP BLD-CCNC: 389 U/L (ref 40–129)
ALT SERPL-CCNC: 71 U/L (ref 10–40)
AMMONIA: 30 UMOL/L (ref 16–60)
ANION GAP SERPL CALCULATED.3IONS-SCNC: 13 MMOL/L (ref 3–16)
APTT: 29.3 SEC (ref 26–36)
AST SERPL-CCNC: 109 U/L (ref 15–37)
BASOPHILS ABSOLUTE: 0 K/UL (ref 0–0.2)
BASOPHILS RELATIVE PERCENT: 0.3 %
BILIRUB SERPL-MCNC: 1.9 MG/DL (ref 0–1)
BILIRUBIN URINE: NEGATIVE
BLOOD, URINE: NEGATIVE
BUN BLDV-MCNC: 22 MG/DL (ref 7–20)
CALCIUM SERPL-MCNC: 9.1 MG/DL (ref 8.3–10.6)
CHLORIDE BLD-SCNC: 102 MMOL/L (ref 99–110)
CLARITY: CLEAR
CO2: 27 MMOL/L (ref 21–32)
COLOR: YELLOW
CREAT SERPL-MCNC: 1.4 MG/DL (ref 0.8–1.3)
EKG ATRIAL RATE: 78 BPM
EKG DIAGNOSIS: NORMAL
EKG Q-T INTERVAL: 418 MS
EKG QRS DURATION: 76 MS
EKG QTC CALCULATION (BAZETT): 488 MS
EKG R AXIS: 20 DEGREES
EKG T AXIS: -16 DEGREES
EKG VENTRICULAR RATE: 82 BPM
EOSINOPHILS ABSOLUTE: 0 K/UL (ref 0–0.6)
EOSINOPHILS RELATIVE PERCENT: 0.1 %
ETHANOL: NORMAL MG/DL (ref 0–0.08)
GFR AFRICAN AMERICAN: 57
GFR NON-AFRICAN AMERICAN: 47
GLOBULIN: 3.8 G/DL
GLUCOSE BLD-MCNC: 157 MG/DL
GLUCOSE BLD-MCNC: 157 MG/DL (ref 70–99)
GLUCOSE BLD-MCNC: 47 MG/DL (ref 70–99)
GLUCOSE BLD-MCNC: 88 MG/DL (ref 70–99)
GLUCOSE URINE: NEGATIVE MG/DL
HCT VFR BLD CALC: 44.3 % (ref 40.5–52.5)
HEMOGLOBIN: 14.4 G/DL (ref 13.5–17.5)
INR BLD: 1.06 (ref 0.86–1.14)
KETONES, URINE: NEGATIVE MG/DL
LACTIC ACID: 2.3 MMOL/L (ref 0.4–2)
LEUKOCYTE ESTERASE, URINE: NEGATIVE
LIPASE: 22 U/L (ref 13–60)
LYMPHOCYTES ABSOLUTE: 0.8 K/UL (ref 1–5.1)
LYMPHOCYTES RELATIVE PERCENT: 6.3 %
MCH RBC QN AUTO: 30.1 PG (ref 26–34)
MCHC RBC AUTO-ENTMCNC: 32.5 G/DL (ref 31–36)
MCV RBC AUTO: 92.8 FL (ref 80–100)
MICROSCOPIC EXAMINATION: ABNORMAL
MONOCYTES ABSOLUTE: 1.3 K/UL (ref 0–1.3)
MONOCYTES RELATIVE PERCENT: 9.6 %
NEUTROPHILS ABSOLUTE: 11 K/UL (ref 1.7–7.7)
NEUTROPHILS RELATIVE PERCENT: 83.7 %
NITRITE, URINE: NEGATIVE
OCCULT BLOOD DIAGNOSTIC: NORMAL
PDW BLD-RTO: 16.3 % (ref 12.4–15.4)
PERFORMED ON: ABNORMAL
PERFORMED ON: NORMAL
PH UA: 7 (ref 5–8)
PLATELET # BLD: 271 K/UL (ref 135–450)
PMV BLD AUTO: 8.1 FL (ref 5–10.5)
POTASSIUM SERPL-SCNC: 3.8 MMOL/L (ref 3.5–5.1)
PROTEIN UA: NEGATIVE MG/DL
PROTHROMBIN TIME: 12.1 SEC (ref 9.8–13)
RBC # BLD: 4.78 M/UL (ref 4.2–5.9)
SODIUM BLD-SCNC: 142 MMOL/L (ref 136–145)
SPECIFIC GRAVITY UA: 1.01 (ref 1–1.03)
TOTAL PROTEIN: 6.9 G/DL (ref 6.4–8.2)
URINE REFLEX TO CULTURE: ABNORMAL
URINE TYPE: ABNORMAL
UROBILINOGEN, URINE: 4 E.U./DL
WBC # BLD: 13.1 K/UL (ref 4–11)

## 2019-06-14 PROCEDURE — 36415 COLL VENOUS BLD VENIPUNCTURE: CPT

## 2019-06-14 PROCEDURE — 2580000003 HC RX 258: Performed by: FAMILY MEDICINE

## 2019-06-14 PROCEDURE — 87040 BLOOD CULTURE FOR BACTERIA: CPT

## 2019-06-14 PROCEDURE — G0328 FECAL BLOOD SCRN IMMUNOASSAY: HCPCS

## 2019-06-14 PROCEDURE — 96365 THER/PROPH/DIAG IV INF INIT: CPT

## 2019-06-14 PROCEDURE — 96375 TX/PRO/DX INJ NEW DRUG ADDON: CPT

## 2019-06-14 PROCEDURE — 99285 EMERGENCY DEPT VISIT HI MDM: CPT

## 2019-06-14 PROCEDURE — 85025 COMPLETE CBC W/AUTO DIFF WBC: CPT

## 2019-06-14 PROCEDURE — 83690 ASSAY OF LIPASE: CPT

## 2019-06-14 PROCEDURE — G0480 DRUG TEST DEF 1-7 CLASSES: HCPCS

## 2019-06-14 PROCEDURE — 85610 PROTHROMBIN TIME: CPT

## 2019-06-14 PROCEDURE — 93005 ELECTROCARDIOGRAM TRACING: CPT | Performed by: FAMILY MEDICINE

## 2019-06-14 PROCEDURE — 1200000000 HC SEMI PRIVATE

## 2019-06-14 PROCEDURE — 71045 X-RAY EXAM CHEST 1 VIEW: CPT

## 2019-06-14 PROCEDURE — 85730 THROMBOPLASTIN TIME PARTIAL: CPT

## 2019-06-14 PROCEDURE — 81003 URINALYSIS AUTO W/O SCOPE: CPT

## 2019-06-14 PROCEDURE — 83605 ASSAY OF LACTIC ACID: CPT

## 2019-06-14 PROCEDURE — 6360000004 HC RX CONTRAST MEDICATION: Performed by: FAMILY MEDICINE

## 2019-06-14 PROCEDURE — 2500000003 HC RX 250 WO HCPCS: Performed by: FAMILY MEDICINE

## 2019-06-14 PROCEDURE — 96372 THER/PROPH/DIAG INJ SC/IM: CPT

## 2019-06-14 PROCEDURE — 82140 ASSAY OF AMMONIA: CPT

## 2019-06-14 PROCEDURE — 2580000003 HC RX 258

## 2019-06-14 PROCEDURE — 84443 ASSAY THYROID STIM HORMONE: CPT

## 2019-06-14 PROCEDURE — 51702 INSERT TEMP BLADDER CATH: CPT

## 2019-06-14 PROCEDURE — 70450 CT HEAD/BRAIN W/O DYE: CPT

## 2019-06-14 PROCEDURE — 93010 ELECTROCARDIOGRAM REPORT: CPT | Performed by: INTERNAL MEDICINE

## 2019-06-14 PROCEDURE — 80053 COMPREHEN METABOLIC PANEL: CPT

## 2019-06-14 PROCEDURE — 6360000002 HC RX W HCPCS: Performed by: FAMILY MEDICINE

## 2019-06-14 PROCEDURE — 74177 CT ABD & PELVIS W/CONTRAST: CPT

## 2019-06-14 RX ORDER — DEXTROSE MONOHYDRATE 25 G/50ML
50 INJECTION, SOLUTION INTRAVENOUS ONCE
Status: COMPLETED | OUTPATIENT
Start: 2019-06-14 | End: 2019-06-14

## 2019-06-14 RX ORDER — 0.9 % SODIUM CHLORIDE 0.9 %
1000 INTRAVENOUS SOLUTION INTRAVENOUS ONCE
Status: DISCONTINUED | OUTPATIENT
Start: 2019-06-14 | End: 2019-06-14

## 2019-06-14 RX ORDER — DEXTROSE AND SODIUM CHLORIDE 5; .9 G/100ML; G/100ML
INJECTION, SOLUTION INTRAVENOUS CONTINUOUS
Status: DISCONTINUED | OUTPATIENT
Start: 2019-06-14 | End: 2019-06-15

## 2019-06-14 RX ORDER — PIPERACILLIN SODIUM, TAZOBACTAM SODIUM 4; .5 G/20ML; G/20ML
4.5 INJECTION, POWDER, LYOPHILIZED, FOR SOLUTION INTRAVENOUS ONCE
Status: COMPLETED | OUTPATIENT
Start: 2019-06-14 | End: 2019-06-14

## 2019-06-14 RX ORDER — 0.9 % SODIUM CHLORIDE 0.9 %
30 INTRAVENOUS SOLUTION INTRAVENOUS ONCE
Status: COMPLETED | OUTPATIENT
Start: 2019-06-14 | End: 2019-06-14

## 2019-06-14 RX ORDER — DEXTROSE MONOHYDRATE 25 G/50ML
INJECTION, SOLUTION INTRAVENOUS
Status: COMPLETED
Start: 2019-06-14 | End: 2019-06-14

## 2019-06-14 RX ADMIN — PIPERACILLIN AND TAZOBACTAM 4.5 G: 4; .5 INJECTION, POWDER, FOR SOLUTION INTRAVENOUS at 22:00

## 2019-06-14 RX ADMIN — SODIUM CHLORIDE 1959 ML: 9 INJECTION, SOLUTION INTRAVENOUS at 20:12

## 2019-06-14 RX ADMIN — METRONIDAZOLE 500 MG: 500 INJECTION, SOLUTION INTRAVENOUS at 20:12

## 2019-06-14 RX ADMIN — DEXTROSE 50 % IN WATER (D50W) INTRAVENOUS SYRINGE 50 ML: at 20:17

## 2019-06-14 RX ADMIN — DEXTROSE MONOHYDRATE AND SODIUM CHLORIDE: 5; .9 INJECTION, SOLUTION INTRAVENOUS at 22:21

## 2019-06-14 RX ADMIN — DEXTROSE MONOHYDRATE 50 ML: 25 INJECTION, SOLUTION INTRAVENOUS at 20:17

## 2019-06-14 RX ADMIN — IOPAMIDOL 75 ML: 755 INJECTION, SOLUTION INTRAVENOUS at 21:07

## 2019-06-15 ENCOUNTER — APPOINTMENT (OUTPATIENT)
Dept: ULTRASOUND IMAGING | Age: 84
DRG: 637 | End: 2019-06-15
Payer: MEDICARE

## 2019-06-15 LAB
A/G RATIO: 0.7 (ref 1.1–2.2)
ALBUMIN SERPL-MCNC: 2.2 G/DL (ref 3.4–5)
ALP BLD-CCNC: 279 U/L (ref 40–129)
ALT SERPL-CCNC: 48 U/L (ref 10–40)
ANION GAP SERPL CALCULATED.3IONS-SCNC: 9 MMOL/L (ref 3–16)
AST SERPL-CCNC: 68 U/L (ref 15–37)
BASOPHILS ABSOLUTE: 0 K/UL (ref 0–0.2)
BASOPHILS RELATIVE PERCENT: 0.3 %
BILIRUB SERPL-MCNC: 1.3 MG/DL (ref 0–1)
BUN BLDV-MCNC: 17 MG/DL (ref 7–20)
CALCIUM SERPL-MCNC: 7.8 MG/DL (ref 8.3–10.6)
CHLORIDE BLD-SCNC: 105 MMOL/L (ref 99–110)
CO2: 22 MMOL/L (ref 21–32)
CREAT SERPL-MCNC: 1.3 MG/DL (ref 0.8–1.3)
EOSINOPHILS ABSOLUTE: 0 K/UL (ref 0–0.6)
EOSINOPHILS RELATIVE PERCENT: 0.2 %
GFR AFRICAN AMERICAN: >60
GFR NON-AFRICAN AMERICAN: 51
GLOBULIN: 3 G/DL
GLUCOSE BLD-MCNC: 101 MG/DL (ref 70–99)
GLUCOSE BLD-MCNC: 102 MG/DL (ref 70–99)
GLUCOSE BLD-MCNC: 103 MG/DL (ref 70–99)
GLUCOSE BLD-MCNC: 127 MG/DL (ref 70–99)
GLUCOSE BLD-MCNC: 140 MG/DL (ref 70–99)
GLUCOSE BLD-MCNC: 209 MG/DL (ref 70–99)
GLUCOSE BLD-MCNC: 46 MG/DL (ref 70–99)
GLUCOSE BLD-MCNC: 52 MG/DL (ref 70–99)
GLUCOSE BLD-MCNC: 62 MG/DL (ref 70–99)
GLUCOSE BLD-MCNC: 65 MG/DL (ref 70–99)
GLUCOSE BLD-MCNC: 67 MG/DL (ref 70–99)
GLUCOSE BLD-MCNC: 94 MG/DL (ref 70–99)
HCT VFR BLD CALC: 36 % (ref 40.5–52.5)
HEMOGLOBIN: 11.8 G/DL (ref 13.5–17.5)
LACTIC ACID: 1.6 MMOL/L (ref 0.4–2)
LACTIC ACID: 2.1 MMOL/L (ref 0.4–2)
LACTIC ACID: 2.5 MMOL/L (ref 0.4–2)
LYMPHOCYTES ABSOLUTE: 1.1 K/UL (ref 1–5.1)
LYMPHOCYTES RELATIVE PERCENT: 10.4 %
MAGNESIUM: 2 MG/DL (ref 1.8–2.4)
MCH RBC QN AUTO: 30.9 PG (ref 26–34)
MCHC RBC AUTO-ENTMCNC: 32.8 G/DL (ref 31–36)
MCV RBC AUTO: 94.3 FL (ref 80–100)
MONOCYTES ABSOLUTE: 1.2 K/UL (ref 0–1.3)
MONOCYTES RELATIVE PERCENT: 11.4 %
NEUTROPHILS ABSOLUTE: 8.1 K/UL (ref 1.7–7.7)
NEUTROPHILS RELATIVE PERCENT: 77.7 %
PDW BLD-RTO: 16.6 % (ref 12.4–15.4)
PERFORMED ON: ABNORMAL
PERFORMED ON: NORMAL
PLATELET # BLD: 188 K/UL (ref 135–450)
PMV BLD AUTO: 7.8 FL (ref 5–10.5)
POTASSIUM REFLEX MAGNESIUM: 3.4 MMOL/L (ref 3.5–5.1)
RBC # BLD: 3.82 M/UL (ref 4.2–5.9)
SODIUM BLD-SCNC: 136 MMOL/L (ref 136–145)
TOTAL PROTEIN: 5.2 G/DL (ref 6.4–8.2)
WBC # BLD: 10.4 K/UL (ref 4–11)

## 2019-06-15 PROCEDURE — 1200000000 HC SEMI PRIVATE

## 2019-06-15 PROCEDURE — 85025 COMPLETE CBC W/AUTO DIFF WBC: CPT

## 2019-06-15 PROCEDURE — 97165 OT EVAL LOW COMPLEX 30 MIN: CPT

## 2019-06-15 PROCEDURE — 36415 COLL VENOUS BLD VENIPUNCTURE: CPT

## 2019-06-15 PROCEDURE — 6370000000 HC RX 637 (ALT 250 FOR IP): Performed by: INTERNAL MEDICINE

## 2019-06-15 PROCEDURE — 97116 GAIT TRAINING THERAPY: CPT

## 2019-06-15 PROCEDURE — 2580000003 HC RX 258: Performed by: INTERNAL MEDICINE

## 2019-06-15 PROCEDURE — 76705 ECHO EXAM OF ABDOMEN: CPT

## 2019-06-15 PROCEDURE — 97530 THERAPEUTIC ACTIVITIES: CPT

## 2019-06-15 PROCEDURE — 6360000002 HC RX W HCPCS: Performed by: INTERNAL MEDICINE

## 2019-06-15 PROCEDURE — 80053 COMPREHEN METABOLIC PANEL: CPT

## 2019-06-15 PROCEDURE — 97162 PT EVAL MOD COMPLEX 30 MIN: CPT

## 2019-06-15 PROCEDURE — 99223 1ST HOSP IP/OBS HIGH 75: CPT | Performed by: INTERNAL MEDICINE

## 2019-06-15 PROCEDURE — 83735 ASSAY OF MAGNESIUM: CPT

## 2019-06-15 PROCEDURE — 83605 ASSAY OF LACTIC ACID: CPT

## 2019-06-15 PROCEDURE — 97535 SELF CARE MNGMENT TRAINING: CPT

## 2019-06-15 RX ORDER — NICOTINE POLACRILEX 4 MG
15 LOZENGE BUCCAL PRN
Status: DISCONTINUED | OUTPATIENT
Start: 2019-06-15 | End: 2019-06-17 | Stop reason: HOSPADM

## 2019-06-15 RX ORDER — CALCIUM CARBONATE 200(500)MG
500 TABLET,CHEWABLE ORAL 3 TIMES DAILY PRN
Status: DISCONTINUED | OUTPATIENT
Start: 2019-06-15 | End: 2019-06-17 | Stop reason: HOSPADM

## 2019-06-15 RX ORDER — DEXTROSE MONOHYDRATE 25 G/50ML
12.5 INJECTION, SOLUTION INTRAVENOUS PRN
Status: DISCONTINUED | OUTPATIENT
Start: 2019-06-15 | End: 2019-06-17 | Stop reason: HOSPADM

## 2019-06-15 RX ORDER — DEXTROSE MONOHYDRATE 50 MG/ML
100 INJECTION, SOLUTION INTRAVENOUS PRN
Status: DISCONTINUED | OUTPATIENT
Start: 2019-06-15 | End: 2019-06-17 | Stop reason: HOSPADM

## 2019-06-15 RX ORDER — ASPIRIN 81 MG/1
81 TABLET, CHEWABLE ORAL DAILY
Status: DISCONTINUED | OUTPATIENT
Start: 2019-06-15 | End: 2019-06-17 | Stop reason: HOSPADM

## 2019-06-15 RX ORDER — ISOSORBIDE DINITRATE 10 MG/1
10 TABLET ORAL
Status: DISCONTINUED | OUTPATIENT
Start: 2019-06-15 | End: 2019-06-17 | Stop reason: HOSPADM

## 2019-06-15 RX ORDER — SODIUM CHLORIDE 0.9 % (FLUSH) 0.9 %
10 SYRINGE (ML) INJECTION EVERY 12 HOURS SCHEDULED
Status: DISCONTINUED | OUTPATIENT
Start: 2019-06-15 | End: 2019-06-17 | Stop reason: HOSPADM

## 2019-06-15 RX ORDER — ACETAMINOPHEN 325 MG/1
650 TABLET ORAL EVERY 4 HOURS PRN
Status: DISCONTINUED | OUTPATIENT
Start: 2019-06-15 | End: 2019-06-17 | Stop reason: HOSPADM

## 2019-06-15 RX ORDER — DEXTROSE AND SODIUM CHLORIDE 5; .9 G/100ML; G/100ML
INJECTION, SOLUTION INTRAVENOUS CONTINUOUS
Status: DISCONTINUED | OUTPATIENT
Start: 2019-06-15 | End: 2019-06-16

## 2019-06-15 RX ORDER — SODIUM CHLORIDE 0.9 % (FLUSH) 0.9 %
10 SYRINGE (ML) INJECTION PRN
Status: DISCONTINUED | OUTPATIENT
Start: 2019-06-15 | End: 2019-06-17 | Stop reason: HOSPADM

## 2019-06-15 RX ORDER — OLOPATADINE HYDROCHLORIDE 1 MG/ML
1 SOLUTION/ DROPS OPHTHALMIC 2 TIMES DAILY
Status: DISCONTINUED | OUTPATIENT
Start: 2019-06-15 | End: 2019-06-17 | Stop reason: HOSPADM

## 2019-06-15 RX ORDER — POTASSIUM CHLORIDE 20 MEQ/1
20 TABLET, EXTENDED RELEASE ORAL ONCE
Status: COMPLETED | OUTPATIENT
Start: 2019-06-15 | End: 2019-06-15

## 2019-06-15 RX ORDER — ATORVASTATIN CALCIUM 10 MG/1
10 TABLET, FILM COATED ORAL NIGHTLY
Status: DISCONTINUED | OUTPATIENT
Start: 2019-06-15 | End: 2019-06-15

## 2019-06-15 RX ORDER — SENNA PLUS 8.6 MG/1
2 TABLET ORAL 2 TIMES DAILY PRN
Status: DISCONTINUED | OUTPATIENT
Start: 2019-06-15 | End: 2019-06-17 | Stop reason: HOSPADM

## 2019-06-15 RX ORDER — PROCHLORPERAZINE EDISYLATE 5 MG/ML
10 INJECTION INTRAMUSCULAR; INTRAVENOUS EVERY 6 HOURS PRN
Status: DISCONTINUED | OUTPATIENT
Start: 2019-06-15 | End: 2019-06-17 | Stop reason: HOSPADM

## 2019-06-15 RX ORDER — DEXTROSE AND SODIUM CHLORIDE 5; .9 G/100ML; G/100ML
INJECTION, SOLUTION INTRAVENOUS CONTINUOUS
Status: DISPENSED | OUTPATIENT
Start: 2019-06-15 | End: 2019-06-15

## 2019-06-15 RX ORDER — ROPINIROLE 0.25 MG/1
0.5 TABLET, FILM COATED ORAL NIGHTLY
Status: DISCONTINUED | OUTPATIENT
Start: 2019-06-15 | End: 2019-06-17 | Stop reason: HOSPADM

## 2019-06-15 RX ADMIN — ISOSORBIDE DINITRATE 10 MG: 10 TABLET ORAL at 06:19

## 2019-06-15 RX ADMIN — DEXTROSE 50 % IN WATER (D50W) INTRAVENOUS SYRINGE 12.5 G: at 07:51

## 2019-06-15 RX ADMIN — DEXTROSE AND SODIUM CHLORIDE: 5; 900 INJECTION, SOLUTION INTRAVENOUS at 11:22

## 2019-06-15 RX ADMIN — Medication 10 ML: at 09:31

## 2019-06-15 RX ADMIN — POTASSIUM CHLORIDE 20 MEQ: 20 TABLET, EXTENDED RELEASE ORAL at 09:30

## 2019-06-15 RX ADMIN — CALCIUM CARBONATE 500 MG: 500 TABLET, CHEWABLE ORAL at 16:57

## 2019-06-15 RX ADMIN — DEXTROSE MONOHYDRATE AND SODIUM CHLORIDE: 5; .9 INJECTION, SOLUTION INTRAVENOUS at 01:35

## 2019-06-15 RX ADMIN — ROPINIROLE HYDROCHLORIDE 0.5 MG: 0.25 TABLET, FILM COATED ORAL at 21:12

## 2019-06-15 RX ADMIN — DEXTROSE AND SODIUM CHLORIDE: 5; 900 INJECTION, SOLUTION INTRAVENOUS at 17:59

## 2019-06-15 RX ADMIN — ACETAMINOPHEN 650 MG: 325 TABLET ORAL at 21:11

## 2019-06-15 RX ADMIN — DEXTROSE AND SODIUM CHLORIDE: 5; 900 INJECTION, SOLUTION INTRAVENOUS at 04:00

## 2019-06-15 RX ADMIN — ROPINIROLE HYDROCHLORIDE 0.5 MG: 0.25 TABLET, FILM COATED ORAL at 02:31

## 2019-06-15 RX ADMIN — ACETAMINOPHEN 650 MG: 325 TABLET ORAL at 09:32

## 2019-06-15 RX ADMIN — ISOSORBIDE DINITRATE 10 MG: 10 TABLET ORAL at 15:34

## 2019-06-15 RX ADMIN — ASPIRIN 81 MG 81 MG: 81 TABLET ORAL at 09:30

## 2019-06-15 RX ADMIN — CALCIUM CARBONATE 500 MG: 500 TABLET, CHEWABLE ORAL at 21:11

## 2019-06-15 RX ADMIN — OLOPATADINE HYDROCHLORIDE 1 DROP: 1 SOLUTION/ DROPS OPHTHALMIC at 09:31

## 2019-06-15 RX ADMIN — DEXTROSE AND SODIUM CHLORIDE: 5; 900 INJECTION, SOLUTION INTRAVENOUS at 21:12

## 2019-06-15 RX ADMIN — DEXTROSE 50 % IN WATER (D50W) INTRAVENOUS SYRINGE 12.5 G: at 11:21

## 2019-06-15 RX ADMIN — ENOXAPARIN SODIUM 30 MG: 100 INJECTION SUBCUTANEOUS at 09:32

## 2019-06-15 RX ADMIN — OLOPATADINE HYDROCHLORIDE 1 DROP: 1 SOLUTION/ DROPS OPHTHALMIC at 02:31

## 2019-06-15 RX ADMIN — ACETAMINOPHEN 650 MG: 325 TABLET ORAL at 16:57

## 2019-06-15 RX ADMIN — ISOSORBIDE DINITRATE 10 MG: 10 TABLET ORAL at 12:21

## 2019-06-15 RX ADMIN — ATORVASTATIN CALCIUM 10 MG: 10 TABLET, FILM COATED ORAL at 02:31

## 2019-06-15 ASSESSMENT — PAIN DESCRIPTION - FREQUENCY
FREQUENCY: CONTINUOUS
FREQUENCY: INTERMITTENT

## 2019-06-15 ASSESSMENT — PAIN SCALES - GENERAL
PAINLEVEL_OUTOF10: 5
PAINLEVEL_OUTOF10: 0
PAINLEVEL_OUTOF10: 2
PAINLEVEL_OUTOF10: 1
PAINLEVEL_OUTOF10: 0
PAINLEVEL_OUTOF10: 3
PAINLEVEL_OUTOF10: 4
PAINLEVEL_OUTOF10: 3
PAINLEVEL_OUTOF10: 3

## 2019-06-15 ASSESSMENT — PAIN DESCRIPTION - DESCRIPTORS
DESCRIPTORS: ACHING;DISCOMFORT
DESCRIPTORS: ACHING

## 2019-06-15 ASSESSMENT — PAIN DESCRIPTION - PAIN TYPE
TYPE: CHRONIC PAIN
TYPE: CHRONIC PAIN

## 2019-06-15 ASSESSMENT — PAIN DESCRIPTION - PROGRESSION
CLINICAL_PROGRESSION: GRADUALLY WORSENING
CLINICAL_PROGRESSION: GRADUALLY IMPROVING

## 2019-06-15 ASSESSMENT — PAIN DESCRIPTION - LOCATION
LOCATION: KNEE
LOCATION: KNEE

## 2019-06-15 ASSESSMENT — PAIN DESCRIPTION - ONSET
ONSET: GRADUAL
ONSET: GRADUAL

## 2019-06-15 ASSESSMENT — PAIN DESCRIPTION - ORIENTATION: ORIENTATION: RIGHT;LEFT

## 2019-06-15 NOTE — PROGRESS NOTES
FSBS was 52, 1/2 amp Dextrose given at this time. Pt being transported to [address]. Will recheck blood sugar when pt returns.

## 2019-06-15 NOTE — ED NOTES
Patient is alert and oriented. Denies pain. States he's unsure of brought him in today.       Samir Burgess RN  06/14/19 0042

## 2019-06-15 NOTE — ED NOTES
Warmer applied to patient for rectal temperature. Dr. Jessica Goyal aware.      Carlyle Patel RN  06/14/19 2014

## 2019-06-15 NOTE — H&P
History and Physical        HISTORY OF PRESENT ILLNESS: 66-year-old male with a history of hypertension, type 2 diabetes, coronary artery disease and osteoarthritis was brought to the emergency room after his son found him unresponsive at home 911 was called his blood sugar was in the 70s. Continues brought to the emergency room his blood sugar was in the 40s and the patient was confused he was treated with D50. He was placed on D5 IV drip and admitted to the [place] [id number] unit. He was seen with his primary care physician a couple of days ago and was advised to stop Amaryl and the patient assures me that he did stop taking Amaryl. He is not on any other diabetic medications. The family has also noticed a functional decline in the past couple of months or so. Patient has No Known Allergies. Past Medical History:   Diagnosis Date    BPH (benign prostatic hypertrophy)     CAD (coronary artery disease)     Chronic kidney disease (CKD), stage III (moderate) ([facility])     Diabetes mellitus type 2, [facility] 7/28/2015    Hyperlipidemia     Hyperlipidemia associated with type 2 diabetes mellitus ([facility] 11/30/2015    Hypertension     Neoplasm of uncertain behavior of skin 8/19/2011    Osteoarthrosis     Renal failure     Restless legs syndrome     Type II or unspecified type diabetes mellitus without mention of complication, not stated as uncontrolled        History reviewed. No pertinent surgical history.     Scheduled Meds:   aspirin  81 mg Oral Daily    atorvastatin  10 mg Oral Nightly    isosorbide dinitrate  10 mg Oral TID AC    olopatadine  1 drop Both Eyes BID    rOPINIRole  0.5 mg Oral Nightly    sodium chloride flush  10 mL Intravenous 2 times per day    enoxaparin  30 mg Subcutaneous Daily       Continuous Infusions:   dextrose 5 % and 0.9 % NaCl 125 mL/hr at 06/15/19 0400    dextrose         PRN Meds:  senna, sodium chloride flush, prochlorperazine, glucose, dextrose, glucagon (rDNA), dextrose       reports that he has never smoked. He has never used smokeless tobacco.    History reviewed. No pertinent family history. Social History     Socioeconomic History    Marital status:      Spouse name: None    Number of children: None    Years of education: None    Highest education level: None   Occupational History    None   Social Needs    Financial resource strain: None    Food insecurity:     Worry: None     Inability: None    Transportation needs:     Medical: None     Non-medical: None   Tobacco Use    Smoking status: Never Smoker    Smokeless tobacco: Never Used   Substance and Sexual Activity    Alcohol use: No    Drug use: No    Sexual activity: Not Currently     Partners: Female   Lifestyle    Physical activity:     Days per week: None     Minutes per session: None    Stress: None   Relationships    Social connections:     Talks on phone: None     Gets together: None     Attends Jew service: None     Active member of club or organization: None     Attends meetings of clubs or organizations: None     Relationship status: None    Intimate partner violence:     Fear of current or ex partner: None     Emotionally abused: None     Physically abused: None     Forced sexual activity: None   Other Topics Concern    None   Social History Narrative    None     REVIEW OF SYSTEMS:   Constitutional: Negative for fever   HENT: Negative for sore throat   Eyes: Negative for redness   Respiratory: Negative for dyspnea, cough   Cardiovascular: Negative for chest pain   Gastrointestinal: Negative for vomiting, diarrhea   Genitourinary: Negative for hematuria   Musculoskeletal: Negative for arthralgias   Skin: Negative for rash   Neurological: Negative for syncope   Hematological: Negative for adenopathy       Vitals:    06/15/19 0055   BP: (!) 102/58   Pulse: 68   Resp: 16   Temp: 98.6 °F (37 °C)   SpO2: 99%     Gen: No distress. Alert. Eyes: PERRL. No sclera icterus.  No conjunctival injection. ENT: No discharge. Pharynx clear. Neck: Trachea midline. Normal thyroid. Resp: No accessory muscle use. No crackles. No wheezes. No rhonchi. No dullness on percussion. CV: Regular rate. Regular rhythm. No murmur or rub. No edema. GI: Non-tender. Non-distended. No masses. No organomegaly. Normal bowel sounds. No hernia. Skin: Warm and dry. No nodule on exposed extremities. No rash on exposed extremities. Lymph: No cervical LAD. No supraclavicular LAD. M/S: No cyanosis. No joint deformity. No clubbing. Neuro: Awake. Psych: Oriented x 3. No anxiety or agitation. CBC:   Recent Labs     06/12/19  0953 06/14/19  1927 06/15/19  0649   WBC 13.3* 13.1* 10.4   HGB 13.6 14.4 11.8*   HCT 41.8 44.3 36.0*   MCV 94.2 92.8 94.3    271 188     BMP:   Recent Labs     06/12/19  0953 06/14/19  1927 06/15/19  0649    142 136   K 4.6 3.8 3.4*    102 105   CO2 23 27 22   BUN 26* 22* 17   CREATININE 1.5* 1.4* 1.3     LIVER PROFILE:   Recent Labs     06/12/19  0953 06/14/19  1927 06/15/19  0649   * 109* 68*   ALT 66* 71* 48*   LIPASE  --  22.0  --    BILITOT 2.3* 1.9* 1.3*   ALKPHOS 406* 389* 279*     PT/INR:   Recent Labs     06/14/19 1927   PROTIME 12.1   INR 1.06     APTT:   Recent Labs     06/14/19 1927   APTT 29.3     UA:  Recent Labs     06/12/19  1009 06/14/19 1958   NITRITE positive  --    COLORU  --  Yellow   PHUR  --  7.0   CLARITYU  --  Clear   SPECGRAV  --  1.015   LEUKOCYTESUR neg Negative   UROBILINOGEN  --  4.0*   BILIRUBINUR neg Negative   BLOODU neg Negative   GLUCOSEU neg Negative       Chest imaging was reviewed by me- No acute cardiopulmonary disease.  The patient's chin obscures visualization  of the superior mediastinum and lung apices. Possible subluxation of the left glenohumeral joint. CT a/p   No acute abnormality in the abdomen or pelvis. CT head   No acute intracranial hemorrhage or mass effect.     Extensive chronic small vessel ischemic disease. Small bilateral basal ganglia remote lacunar infarcts. ASSESSMENT:  Active Problems:    Acute encephalopathy  Resolved Problems:    * No resolved hospital problems. *        PLAN:  1. Metabolic encephalopathy secondary to hypoglycemia. The encephalopathy has now resolved. He is currently on a D5 IV drip. His sugars continue to be low. We will start him on a general diet and monitor sugars very closely. 2.  Coronary artery disease. Stable. Continue aspirin . Hold Lipitor given elevated LFTs. 3.  Hypertension. His blood pressure readings have been low. Hold the triamterene hydrochlorothiazide and atenolol. 4.  Elevated LFTs. CT abdomen pelvis unremarkable. He does not have abdominal pain or tenderness. Hold Lipitor. Will obtain a right upper quadrant ultrasound. 5.  Generalized weakness and debility. Will obtain OT PT consult. Might need SNF.     Lovenox for DVT prophylaxis               [facility] 6/15/2019 7:49 AM

## 2019-06-15 NOTE — PROGRESS NOTES
NT    Upper Extremity Proprioception:   WNL    Coordination and Tone  WNL    Balance  Functional Sitting Balance: WNL  Functional Standing Balance:Impaired    Bed mobility:    Supine to Sit:          SBA with HOB elevated ,used handrail  Sit to Supine:          SBA with HOB flat  Rolling:                    Not Tested  Scooting at EOB:   SBA  Scooting to [facility]:   Not Tested    Transfers:    Sit to stand:                       CGA  Stand to sit:                       CGA  Bed to Chair:          CGA with use of rolling walker (RW) and gait belt    Activity Tolerance   Pt completed therapy session with No adverse symptoms    Dressing:  UE: Mod A to don/dof hospital gown in bed   LE:    Not Tested    Bathing:    UE: Mod A  LE:  Mod A    Eating:   Min A    Toileting: Max A for kennedy care     Positioning Needs: In bed, call light and needs in reach. Alarm Set    Exercise / Activities Initiated:   N/A    Patient/Family Education:   Role of OT  Recommendations for DC  (attempted, pt limited comprehension 2/2 [place])     Assessment of Deficits: Pt seen for Occupational therapy evaluation in acute care setting. Pt demonstrated decreased Activity Tolerance, ADL's, Balance, Bed Mobility, Safety Awareness, Strength and Transfers. Pt functioning below baseline and will likely benefit from skilled occupational therapy services to maximize safety and independence. Goal(s) : To be met in 3 Visits:  1). Bed to toilet: Min A    To be met in 5 Visits:  1). Supine to Sit: Independent  2). Upper Body Bathing:  Min A  3). Lower Body Bathing:  Min A  4). Upper Body Dressing: Min A  5). Lower Body Dressing: Min A  6). Pt to janae UE exs x 15 reps    Rehabilitation Potential:  Good for goals listed above. Strengths for achieving goals include: Pt motivated and Pt cooperative  Barriers to achieving goals include:  Hearing      Plan:   To be seen 3-5 x/wk while in acute care setting for therapeutic exercises, bed mobility, transfers, dressing, bathing, family/patient education with adaptive equipment, breathing technique instruction.      RICARDO Mars, OTR/L   [id number]           If patient discharges from this facility prior to next visit, this note will serve as the Discharge Summary

## 2019-06-15 NOTE — ED PROVIDER NOTES
Triage Chief Complaint:   Altered Mental Status (pt son states pt last seen normal 1430 this afternoon. pt son woke up from nap between 1530 and 1700 and found pt with altered mental status. pt not able to stand and pivot to wheel chair. pt unable to answer questions.  )    [place]:  Isela Riojas is a 80 y.o. male that presents obtunded and unable to ambulate requiring 2 people to get him out of the car into a wheelchair and then into the stretcher. Patient is unable to provide a review of systems. He does demonstrate scleral icterus. Per the family he has had increasing episodes of sleepiness over the last several days but today he would not wake up. There is also been on intended weight loss that the patient's other regular doctor for 2 days ago with no change in therapy at that time. 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 No pertinent surgical history. History reviewed. No pertinent family history.   Social History     Socioeconomic History    Marital status:      Spouse name: Not on file    Number of children: Not on file    Years of education: Not on file    Highest education level: Not on file   Occupational History    Not on file   Social Needs    Financial resource strain: Not on file    Food insecurity:     Worry: Not on file     Inability: Not on file    Transportation needs:     Medical: Not on file Non-medical: Not on file   Tobacco Use    Smoking status: Never Smoker    Smokeless tobacco: Never Used   Substance and Sexual Activity    Alcohol use: No    Drug use: No    Sexual activity: Not Currently     Partners: Female   Lifestyle    Physical activity:     Days per week: Not on file     Minutes per session: Not on file    Stress: Not on file   Relationships    Social connections:     Talks on phone: Not on file     Gets together: Not on file     Attends Spiritism service: Not on file     Active member of club or organization: Not on file     Attends meetings of clubs or organizations: Not on file     Relationship status: Not on file    Intimate partner violence:     Fear of current or ex partner: Not on file     Emotionally abused: Not on file     Physically abused: Not on file     Forced sexual activity: Not on file   Other Topics Concern    Not on file   Social History Narrative    Not on file     Current Facility-Administered Medications   Medication Dose Route Frequency Provider Last Rate Last Dose    aspirin chewable tablet 81 mg  81 mg Oral Daily Fidel Madera MD        atorvastatin (LIPITOR) tablet 10 mg  10 mg Oral Nightly Fidel Madera MD   10 mg at 06/15/19 0231    isosorbide dinitrate (ISORDIL) tablet 10 mg  10 mg Oral TID [facility] [place] Fidel Madera MD   10 mg at 06/15/19 0619    olopatadine (PATANOL) 0.1 % ophthalmic solution 1 drop  1 drop Both Eyes BID Fidel Madera MD   1 drop at 06/15/19 0231    rOPINIRole (REQUIP) tablet 0.5 mg  0.5 mg Oral Nightly Fidel Madera MD   0.5 mg at 06/15/19 0231    senna (SENOKOT) tablet 17.2 mg  2 tablet Oral BID PRN Fidel Madera MD        sodium chloride flush 0.9 % injection 10 mL  10 mL Intravenous 2 times per day Fidel Madera MD        sodium chloride flush 0.9 % injection 10 mL  10 mL Intravenous PRN Fidel Madera MD        enoxaparin (LOVENOX) injection 30 mg  30 mg Subcutaneous Daily MD Guille Mcfadden prochlorperazine (COMPAZINE) injection 10 mg  10 mg Intravenous Q6H PRN Fidel Madera MD        dextrose 5 % and 0.9 % sodium chloride infusion   Intravenous Continuous Fidel Madera  mL/hr at 06/15/19 0400      glucose (GLUTOSE) 40 % oral gel 15 g  15 g Oral PRN Fidel Madera MD        dextrose 50 % IV solution  12.5 g Intravenous PRN Fidel Madera MD        glucagon (rDNA) injection 1 mg  1 mg Intramuscular PRN Fidel Madera MD        dextrose 5 % solution  100 mL/hr Intravenous PRN Fidel Madera MD         No Known Allergies    Nursing Notes Reviewed    Physical Exam:  ED Triage Vitals   Enc Vitals Group      BP 06/14/19 1939 (!) 183/116      Pulse 06/14/19 1939 113      Resp 06/14/19 1939 16      Temp 06/14/19 1958 96.3 °F (35.7 °C)      Temp Source 06/14/19 1958 Rectal      SpO2 06/14/19 1939 96 %      Weight 06/14/19 1939 144 lb (65.3 kg)      Height 06/14/19 1939 5' 5\" (1.651 m)      Head Circumference --       Peak Flow --       Pain Score --       Pain Loc --       Pain Edu? --       Excl. in [address]? --        My pulse ox interpretation is - normal    General appearance: Dry, cachectic, tachycardic, obtunded  Skin:  Warm. Dry. No petechiae or purpura. Eye:  Extraocular movements intact. PERRLA  Ears, nose, mouth and throat: Dry mucous membranes, no trismus. Tympanic membranes bilaterally normal.  Oropharynx with no exudate or erythema. Neck:  Trachea midline. Supple. No cervical lymphadenopathy  Extremity:  No swelling. Normal ROM. No calf pain or asymmetric swelling. No lower extremity edema  Heart: Tachycardic, normal S1 & S2, no extra heart sounds. Perfusion:  Intact, capillary refill 3 to 4 seconds  Respiratory:  Lungs clear to auscultation bilaterally. Respirations nonlabored. Abdominal:  Normal bowel sounds. Firm. No fluid wave no peritoneal signs. No hepatosplenomegaly. Back:  No CVA tenderness to palpation. No bruising.   No CTL tenderness to palpation or step-off  Neurological: Symmetrical facies but obtunded. Protecting airway. Moves upper extremities and lower extremities appropriately to pain.           Psychiatric: Unable to be obtained    I have reviewed and interpreted all of the currently available lab results from this visit (if applicable):  Results for orders placed or performed during the hospital encounter of 06/14/19   CBC Auto Differential   Result Value Ref Range    WBC 13.1 (H) 4.0 - 11.0 K/uL    RBC 4.78 4.20 - 5.90 M/uL    Hemoglobin 14.4 13.5 - 17.5 g/dL    Hematocrit 44.3 40.5 - 52.5 %    MCV 92.8 80.0 - 100.0 fL    MCH 30.1 26.0 - 34.0 pg    MCHC 32.5 31.0 - 36.0 g/dL    RDW 16.3 (H) 12.4 - 15.4 %    Platelets 458 573 - 636 K/uL    MPV 8.1 5.0 - 10.5 fL    Neutrophils % 83.7 %    Lymphocytes % 6.3 %    Monocytes % 9.6 %    Eosinophils % 0.1 %    Basophils % 0.3 %    Neutrophils # 11.0 (H) 1.7 - 7.7 K/uL    Lymphocytes # 0.8 (L) 1.0 - 5.1 K/uL    Monocytes # 1.3 0.0 - 1.3 K/uL    Eosinophils # 0.0 0.0 - 0.6 K/uL    Basophils # 0.0 0.0 - 0.2 K/uL   Comprehensive Metabolic Panel   Result Value Ref Range    Sodium 142 136 - 145 mmol/L    Potassium 3.8 3.5 - 5.1 mmol/L    Chloride 102 99 - 110 mmol/L    CO2 27 21 - 32 mmol/L    Anion Gap 13 3 - 16    Glucose 47 (LL) 70 - 99 mg/dL    BUN 22 (H) 7 - 20 mg/dL    CREATININE 1.4 (H) 0.8 - 1.3 mg/dL    GFR Non- 47 (A) >60    GFR  57 (A) >60    Calcium 9.1 8.3 - 10.6 mg/dL    Total Protein 6.9 6.4 - 8.2 g/dL    Alb 3.1 (L) 3.4 - 5.0 g/dL    Albumin/Globulin Ratio 0.8 (L) 1.1 - 2.2    Total Bilirubin 1.9 (H) 0.0 - 1.0 mg/dL    Alkaline Phosphatase 389 (H) 40 - 129 U/L    ALT 71 (H) 10 - 40 U/L     (H) 15 - 37 U/L    Globulin 3.8 g/dL   Lipase   Result Value Ref Range    Lipase 22.0 13.0 - 60.0 U/L   Urine, reflex to culture   Result Value Ref Range    Color, UA Yellow Straw/Yellow    Clarity, UA Clear Clear    Glucose, Ur Negative Negative mg/dL Bilirubin Urine Negative Negative    Ketones, Urine Negative Negative mg/dL    Specific Gravity, UA 1.015 1.005 - 1.030    Blood, Urine Negative Negative    pH, UA 7.0 5.0 - 8.0    Protein, UA Negative Negative mg/dL    Urobilinogen, Urine 4.0 (A) <2.0 E.U./dL    Nitrite, Urine Negative Negative    Leukocyte Esterase, Urine Negative Negative    Microscopic Examination Not Indicated     Urine Reflex to Culture Not Indicated     Urine Type Not Specified    Ethanol   Result Value Ref Range    Ethanol Lvl None Detected mg/dL   Lactic Acid, Plasma   Result Value Ref Range    Lactic Acid 2.3 (H) 0.4 - 2.0 mmol/L   Ammonia   Result Value Ref Range    Ammonia 30 16 - 60 umol/L   Protime-INR   Result Value Ref Range    Protime 12.1 9.8 - 13.0 sec    INR 1.06 0.86 - 1.14   APTT   Result Value Ref Range    aPTT 29.3 26.0 - 36.0 sec   Blood occult stool #1   Result Value Ref Range    Occult Blood Diagnostic Result: Negative  Normal range: Negative      Lactic Acid, Plasma   Result Value Ref Range    Lactic Acid 2.1 (H) 0.4 - 2.0 mmol/L   POCT Glucose   Result Value Ref Range    POC Glucose 88 70 - 99 mg/dl    Performed on ACCU-CHEK    POCT Glucose   Result Value Ref Range    Glucose 157 mg/dL   POCT Glucose   Result Value Ref Range    POC Glucose 157 (H) 70 - 99 mg/dl    Performed on ACCU-CHEK    POCT Glucose   Result Value Ref Range    POC Glucose 127 (H) 70 - 99 mg/dl    Performed on ACCU-CHEK    POCT Glucose   Result Value Ref Range    POC Glucose 103 (H) 70 - 99 mg/dl    Performed on ACCU-CHEK    POCT Glucose   Result Value Ref Range    POC Glucose 65 (L) 70 - 99 mg/dl    Performed on ACCU-CHEK    POCT Glucose   Result Value Ref Range    POC Glucose 101 (H) 70 - 99 mg/dl    Performed on ACCU-CHEK    EKG 12 Lead   Result Value Ref Range    Ventricular Rate 82 BPM    Atrial Rate 78 BPM    QRS Duration 76 ms    Q-T Interval 418 ms    QTc Calculation (Bazett) 488 ms    R Axis 20 degrees    T Axis -16 degrees    Diagnosis Normal sinus rhythmPremature atrial complexesPremature ventricular complexesNonspecific T wave abnormality , probably digitalis effectProlonged QTAbnormal ECGNo previous ECGs availableConfirmed by Mary Abernathy MD, [facility] ([id number]) on 6/14/2019 9:15:42 PM      Radiographs (if obtained):  [] The following radiograph was interpreted by myself in the absence of a radiologist:   [x] Radiologist's Report Reviewed:  CT ABDOMEN PELVIS W IV CONTRAST   Final Result   No acute abnormality in the abdomen or pelvis. CT Head WO Contrast   Final Result   No acute intracranial hemorrhage or mass effect. Extensive chronic small vessel ischemic disease. Small bilateral basal ganglia remote lacunar infarcts. XR CHEST PORTABLE   Final Result   No acute cardiopulmonary disease. The patient's chin obscures visualization   of the superior mediastinum and lung apices. Possible subluxation of the left glenohumeral joint. EKG (if obtained): (All EKG's are interpreted by myself in the absence of a cardiologist) EKG with no comparison demonstrates normal sinus rhythm. Rate 82. Premature atrial and ventricular complexes are present. QRS 76. QTc 488. Normal R wave progression. Prolonged QTC. No acute ST elevations or depressions. Abnormal EKG. Chart review shows recent radiographs:  Ct Head Wo Contrast    Result Date: 6/14/2019  EXAMINATION: CT OF THE HEAD WITHOUT CONTRAST  6/14/2019 8:57 pm TECHNIQUE: CT of the head was performed without the administration of intravenous contrast. Dose modulation, iterative reconstruction, and/or weight based adjustment of the mA/kV was utilized to reduce the radiation dose to as low as reasonably achievable. COMPARISON: None. HISTORY: ORDERING SYSTEM PROVIDED HISTORY: altered TECHNOLOGIST PROVIDED HISTORY: Has a \"code stroke\" or \"stroke alert\" been called? ->No Ordering Physician Provided Reason for Exam: weight loss Acuity: Acute Type of Exam: Initial FINDINGS: BRAIN/VENTRICLES: Generalized cerebral and cerebellar volume loss. Ill-defined periventricular white matter hypoattenuation, commonly seen in the setting of chronic small vessel ischemic disease. Small bilateral basal ganglia remote lacunar infarcts. There is no acute intracranial hemorrhage, mass effect or midline shift. No abnormal extra-axial fluid collection. The gray-white differentiation is maintained without evidence of an acute infarct. Mild ventricular prominence likely relates to central volume loss. Atherosclerosis of the intracranial internal carotid and vertebrobasilar arteries. . ORBITS: Prior bilateral cataract surgery. SINUSES: The visualized paranasal sinuses and mastoid air cells demonstrate no acute abnormality. SOFT TISSUES/SKULL:  No acute abnormality of the visualized skull or soft tissues. No acute intracranial hemorrhage or mass effect. Extensive chronic small vessel ischemic disease. Small bilateral basal ganglia remote lacunar infarcts. Ct Abdomen Pelvis W Iv Contrast    Result Date: 6/14/2019  EXAMINATION: CT OF THE ABDOMEN AND PELVIS WITH CONTRAST 6/14/2019 8:58 pm TECHNIQUE: CT of the abdomen and pelvis was performed with the administration of intravenous contrast. Multiplanar reformatted images are provided for review. Dose modulation, iterative reconstruction, and/or weight based adjustment of the mA/kV was utilized to reduce the radiation dose to as low as reasonably achievable. COMPARISON: CT abdomen and pelvis 09/26/2018. HISTORY: ORDERING SYSTEM PROVIDED HISTORY: altered, weight loss, abnormal lft's TECHNOLOGIST PROVIDED HISTORY: IV contrast only. Thank you. Ordering Physician Provided Reason for Exam: weight loss Acuity: Acute Type of Exam: Initial FINDINGS: Lower Chest: Coronary artery atherosclerotic vascular calcifications. Limited images through the lung bases demonstrate no acute process. Organs: 1.9 cm cyst in the left hepatic lobe. The gallbladder is absent. fluid bolus, empiric antibiotics with Zosyn for potential intra-abdominal source, blood cultures, urine culture, chest x-ray. Consideration for intubation was made however patient protecting airway. CT had negative for acute changes to include bleed mass stroke fracture or other. Chest x-ray negative for evidence of infiltrate, pneumothorax, pleural effusion, cardiomegaly, wide mediastinum    Glucose 47.  1 amp of D50 given. And D5 normal saline started at 250 cc/h in addition to the 30 cc/kg fluid bolus. With both treatments patient slowly began to regain a normal level of alertness    Liver function enzymes elevated. Unclear etiology. CT abdomen pelvis negative for acute intra-abdominal process as well as any evidence of malignancy surprisingly. Hypothermia treated with warming blankets. Patient did have a transient drop in blood pressure when antibiotics given and warming blankets placed however he responded well to fluid bolus and did not require initiation of pressors. At time patient was transferred he is dramatically better, talking, alert, eating. Family states patient has not looked this good in a weeks. In total critical care time of 70 minutes with CNS, endocrine, infectious disease, renal, metabolic, hepatic, pulmonary, electrolyte systems out acute risk of decompensation and collapse requiring frequent bedside repeat evaluations, consideration of intubation and central line placement, and multiple family updates at bedside. This is exclusionary of any billable procedures. Clinical Impression:  1. Confusion    2. Elevated LFTs    3. Hypothermia, initial encounter    4.  Hypoglycemia      Disposition referral (if applicable):  Markel Salmon MD  [address] , [address] [facility]  [phone]          Disposition medications (if applicable):  Current Discharge Medication List          Comment: Please note this report has been produced using speech recognition software and may contain errors related to that system including errors in grammar, punctuation, and spelling, as well as words and phrases that may be inappropriate. If there are any questions or concerns please feel free to contact the dictating provider for clarification.       Dalton Vasquez MD  06/15/19 6254

## 2019-06-15 NOTE — PLAN OF CARE
80 yoM presented to [facility] lethargic and needed to be carried by 2 people from car into ER.   MS change x last 3 days, functional decline for last month. He was found to have BGT of 47. W/ dextrose and IVF he has perked up significantly per ED doc. Hypoglycemia w/o hx DM  Acute encephalopathy   Elevated LFT's w/ scleral icterus, normal NH3. Lactic acidosis w/o identifiable source of infection.    Leukocytosis  Prolonged QTc

## 2019-06-15 NOTE — ED NOTES
Patient sitting up in bed feeding himself after verbal clearance by Dr. Fred Nails. The patient is alert and oriented. Denies pain.       Dennise Myers RN  06/14/19 5579

## 2019-06-15 NOTE — PROGRESS NOTES
Pt sitting in bed. C/o of knee pain. Ordered tylenol given and ice applied to bilateral knees. Lungs clear to auscultation. Alert and oriented with confusion at times. Call light within reach. NPO at this time for ultrasound. Bed alarm on.

## 2019-06-15 NOTE — PROGRESS NOTES
Inpatient Physical Therapy Evaluation and Treatment    Unit: [facility]  Date:  6/15/2019  Patient Name:    Robert Bueno  Admitting diagnosis:  Acute encephalopathy [G93.40]  Acute encephalopathy [G93.40]  Admit Date:  6/14/2019  Precautions/Restrictions/WB Status/ Lines/ Wounds/ Oxygen: fall risk, IV, bed/chair alarm and guido catheter , [employer]    Treatment Time:  12:40-14:00  Treatment Number:  1   Timed Code Treatment Minutes: 70minutes  Total Treatment Minutes:   [de-identified] minutes  80year old man found unresponsive at home by son, and brought to ED. Blood sugar was in the 40's in the ED. PMH: HTN,DM2, CAD, OA B knees  Abdominal CT was negative; Head CT revealed no acute process, extensive chronic small vessel ischemic disease and small bilateral basal ganglia remote lacunar infarcts    Patient Goals for Therapy: \" to get up \"          Discharge Recommendations: SNF  DME needs for discharge: defer to facility       Therapy recommendations for staff:   Assist of 1 with use of rolling walker (RW) and gait belt for all transfers or ambulation to/from chair    Home Health S4 Level Recommendation:  NA  AM-PAC Mobility Score    AM-PAC Inpatient Mobility Raw Score : 17          Preadmission Environment    Pt. Lives with Jean Marie Noguera"  (son)   Home environment:    mobile home/trailer  Steps to enter first floor:   No steps     (ramp  Steps to second floor: N/A  Bathroom:       Walk in [employer], Grab bars and [address]   Equipment owned:      Caviar and quad cane      Preadmission Status / PLOF:  History of falls             Yes  (Pt reports multiple falls in the last few weeks. Pt reports 1 fall was about a week ago and he laid on the floor for 16 hours.)   Pt. Able to drive          No  Pt Fully independent with ADL's         Yes  Pt. Required assistance from family for:  Pt makes microwave meals and some sandwiches. Pt reports son does cleaning, laundry and grocery shopping.    Pt. Fully independent for transfers and gait and walked with: Dexter Villagomez      Pain   Not currently, c/o bilateral knee pain earlier  Location: B knees  Pain Medicine Status: received tylenol and ice earlier    Cognition    A&O Person  and Place    Able to follow 1 step commands inconsistently    Subjective  Patient lying supine in bed with no family present  Pt agreeable to this PT eval & tx. Requesting to use BSC    Upper Extremity ROM/Strength  Please see OT evaluation. Lower Extremity ROM / Strength    AROM WFL: No  ROM limitations:   Difficulty following cues to complete MMT. Grossly 3+/4-/5 BLE's    Lower Extremity Sensation    WFL    Lower Extremity Proprioception:   NT    Coordination and Tone  NT    Balance  Static Sitting:  Good    Tolerance:   Dynamic Sitting:  Good -   Static Standing: Fair + with RW   Tolerance:   Dynamic Standing: Fair     Bed Mobility   Supine to Sit:   SBA with HOB elevated ,used handrail  Sit to Supine:  SBA with HOB flat  Rolling:   Not Tested  Scooting at EOB: SBA  Scooting to [facility]:  Not Tested    Transfer Training     Sit to stand:   CGA  Stand to sit:   CGA  Bed to BSC:  CGA with use of rolling walker (RW) and gait belt    Gait gait completed as indicated below  Distance:  3ft x2  Deviations (firm surface/linoleum): decreased katy, [place]uff[place] and narrow MARCK   Assistive Device Used:  rolling walker (RW) and gait belt  Level of Assist: CGA  Comment: forward flexed trunk, lacks full extension in B knees    Stair Training deferred, pt unsafe/not appropriate to complete stairs at this time      Activity Tolerance   Pt completed therapy session with No nausea, dizziness, pain or SOB noted w/activity  SpO2:   HR:  BP:     Positioning Needs   Pt returned to bed, call light and needs in reach    Exercises Initiated    N/A    Other  RN states MD does not choose to order and additional x-rays at this time  Attempted to discuss D/C recommendations-unsure that patient fully heard/understood recommendations.     Patient/Family Education Pt educated on role of inpatient PT, POC, importance of continued activity, DC recommendations and calling for assist with mobility. Assessment  Pt seen for Physical Therapy evaluation in acute care setting. Pt demonstrated decreased Activity tolerance, Balance, Safety and Strength and decreased independence with Ambulation, Bed Mobility  and Transfers. Goals : To be met in 3 visits:  1). Independent with LE Ex x 10 reps    To be met in 6 visits:  1). Supine to/from sit: Supervision  2). Sit to/from stand: SBA and with use of RW  3). Bed to chair: SBA and with use of RW  4). Gait: Ambulate 50 ft  with  CGA and with use of RW  and use of rolling walker (RW)  5). Tolerate B LE exercises 3 sets of 10-15 reps    Rehabilitation Potential: Good  Strengths for achieving goals include:   PLOF and Pt cooperative  Barriers to achieving goals include:    Pain  and Weakness    Plan    To be seen 3-5 x / week  while in acute care setting for therapeutic exercises, bed mobility, transfers, progressive gait training, balance training, and family/patient education. [place] [facility], PT #[id number]    If patient discharges from this facility prior to next visit, this note will serve as the Discharge Summary.

## 2019-06-15 NOTE — ED NOTES
Report to [facility] for transport to [facility].      Minh Arauz RN  06/15/19 6726

## 2019-06-15 NOTE — CONSULTS
Gastroenterology Consult Note    Patient:   Krzysztof Cardenas   :    1924   Facility:   [facility]  Referring/PCP: Angel Bae MD  Date:     6/15/2019  Consultant:   Florence Summers DO    Subjective: This 80 y.o. male was admitted 2019 with a diagnosis of \"Acute encephalopathy [G93.40]  Acute encephalopathy [G93.40]\" and is seen in consultation regarding abnormal liver enzymes    81 yo male with HTN, diabetes, CAD, osteoarthritis presented after being found down and unresponsive with hypoglycemia. Patient had normal liver enzymes in 2018 but they were elevated on 2019 and have been trending down since admission. Patient appears to have some confusion but it is unclear what his baseline cognitive status is. Past Medical History:   Diagnosis Date    BPH (benign prostatic hypertrophy)     CAD (coronary artery disease)     Chronic kidney disease (CKD), stage III (moderate) (HCC)     Diabetes mellitus type 2, controlled ([facility] [address] 2015    Hyperlipidemia     Hyperlipidemia associated with type 2 diabetes mellitus ([facility] [address] 2015    Hypertension     Neoplasm of uncertain behavior of skin 2011    Osteoarthrosis     Renal failure     Restless legs syndrome     Type II or unspecified type diabetes mellitus without mention of complication, not stated as uncontrolled      History reviewed. No pertinent surgical history. Social:   Social History     Tobacco Use    Smoking status: Never Smoker    Smokeless tobacco: Never Used   Substance Use Topics    Alcohol use: No     Family: History reviewed. No pertinent family history.     Scheduled Medications:    aspirin  81 mg Oral Daily    isosorbide dinitrate  10 mg Oral TID AC    olopatadine  1 drop Both Eyes BID    rOPINIRole  0.5 mg Oral Nightly    sodium chloride flush  10 mL Intravenous 2 times per day    enoxaparin  30 mg Subcutaneous Daily     Infusions:    dextrose 5 % and 0.9 % NaCl 125 mL/hr at 06/15/19 1122    dextrose       PRN Medications: senna, sodium chloride flush, prochlorperazine, glucose, dextrose, glucagon (rDNA), dextrose, acetaminophen  Allergies: No Known Allergies    ROS: Constitutional: negative for chills, fevers and sweats  Eyes: negative for cataracts, icterus and redness  Ears, nose, mouth, throat, and face: negative for epistaxis, hearing loss and sore throat  Respiratory: negative for cough, hemoptysis and sputum  Cardiovascular: negative for chest pain, dyspnea and lower extremity edema  Gastrointestinal: as per HPI  Genitourinary:negative for dysuria, frequency and hematuria  Neurological: negative for coordination problems, dizziness and gait problems  Behavioral/Psych: negative for anxiety, depression and mood swings    Objective:     Physical Exam:   /70   Pulse 70   Temp 97 °F (36.1 °C) (Oral)   Resp 18   Ht 5' 5\" (1.651 m)   Wt 147 lb 4.8 oz (66.8 kg)   SpO2 100%   BMI 24.51 kg/m²     /70   Pulse 70   Temp 97 °F (36.1 °C) (Oral)   Resp 18   Ht 5' 5\" (1.651 m)   Wt 147 lb 4.8 oz (66.8 kg)   SpO2 100%   BMI 24.51 kg/m²   General appearance: alert, appears stated age and cooperative  Lungs: clear to auscultation bilaterally  Heart: regular rate and rhythm, S1, S2 normal, no murmur, click, rub or gallop  Abdomen: soft, non-tender; bowel sounds normal; no masses,  no organomegaly  Extremities: extremities normal, atraumatic, no cyanosis or edema    Lab and Imaging Review   Labs:  CBC:   Recent Labs     06/14/19  1927 06/15/19  0649   WBC 13.1* 10.4   HGB 14.4 11.8*   HCT 44.3 36.0*   MCV 92.8 94.3    188     BMP:   Recent Labs     06/14/19  1927 06/15/19  0649    136   K 3.8 3.4*    105   CO2 27 22   BUN 22* 17   CREATININE 1.4* 1.3     LIVER PROFILE:   Recent Labs     06/14/19  1927 06/15/19  0649   * 68*   ALT 71* 48*   LIPASE 22.0  --    PROT 6.9 5.2*   BILITOT 1.9* 1.3*   ALKPHOS 389* 279*     PT/INR:   Recent Labs 06/14/19 1927   INR 1.06       Assessment:     79 yo male with abnormal liver enzymes after being found down at home    Plan:   1.  Suspect liver enzyme elevation related to congestion during hypoglycemic episode  Continue to monitor for improvement

## 2019-06-15 NOTE — PROGRESS NOTES
Pt c/o of bilateral severe knee pain. Increased swelling, pt stated no different then normal, reviewed with Dr. Cyn Hussein no imaging at this time. New order for PRN tylenol 2 tabs PO every 4 hours with repeat back.

## 2019-06-15 NOTE — ED NOTES
The patient arrived POV to the ED and was a full lift out of his son's car into a wheelchair to get him into the department. Then again had to be lifted into the bed from the wheelchair. The patient is lethargic. Responds to stimuli with eyes closed with incomprehensible speech. The patient follow commands and can move extremities but can't bear his own weight. The patient has dried dark stool in his boxers and buttocks. Dried urine and stool on the legs. Erythema, dry skin, and scabbing to the hips. Soiled clothing removed. Skin care provided.  FS 88 on arrival.     Anahyia Constance, [address] [place] [address]  06/14/19 0259

## 2019-06-15 NOTE — PLAN OF CARE
Problem: Safety:  Goal: Free from accidental physical injury  Description  Free from accidental physical injury  Outcome: Ongoing  Goal: Free from intentional harm  Description  Free from intentional harm  Outcome: Ongoing     Problem: Pain:  Goal: Patient's pain/discomfort is manageable  Description  Patient's pain/discomfort is manageable  Outcome: Ongoing     Problem: Urinary Elimination:  Goal: Signs and symptoms of infection will decrease  Description  Signs and symptoms of infection will decrease  Outcome: Ongoing  Goal: Complications related to the disease process, condition or treatment will be avoided or minimized  Description  Complications related to the disease process, condition or treatment will be avoided or minimized  Outcome: Ongoing     Problem: Falls - Risk of:  Goal: Will remain free from falls  Description  Will remain free from falls  Outcome: Ongoing  Goal: Absence of physical injury  Description  Absence of physical injury  Outcome: Ongoing

## 2019-06-16 LAB
A/G RATIO: 0.9 (ref 1.1–2.2)
ALBUMIN SERPL-MCNC: 2.7 G/DL (ref 3.4–5)
ALP BLD-CCNC: 254 U/L (ref 40–129)
ALT SERPL-CCNC: 42 U/L (ref 10–40)
ANION GAP SERPL CALCULATED.3IONS-SCNC: 11 MMOL/L (ref 3–16)
AST SERPL-CCNC: 53 U/L (ref 15–37)
BILIRUB SERPL-MCNC: 1.5 MG/DL (ref 0–1)
BUN BLDV-MCNC: 17 MG/DL (ref 7–20)
CALCIUM SERPL-MCNC: 8 MG/DL (ref 8.3–10.6)
CHLORIDE BLD-SCNC: 107 MMOL/L (ref 99–110)
CO2: 19 MMOL/L (ref 21–32)
CREAT SERPL-MCNC: 1.5 MG/DL (ref 0.8–1.3)
GFR AFRICAN AMERICAN: 53
GFR NON-AFRICAN AMERICAN: 44
GLOBULIN: 2.9 G/DL
GLUCOSE BLD-MCNC: 125 MG/DL (ref 70–99)
GLUCOSE BLD-MCNC: 133 MG/DL (ref 70–99)
GLUCOSE BLD-MCNC: 174 MG/DL (ref 70–99)
GLUCOSE BLD-MCNC: 176 MG/DL (ref 70–99)
GLUCOSE BLD-MCNC: 181 MG/DL (ref 70–99)
GLUCOSE BLD-MCNC: 187 MG/DL (ref 70–99)
GLUCOSE BLD-MCNC: 283 MG/DL (ref 70–99)
LACTIC ACID: 2.3 MMOL/L (ref 0.4–2)
LACTIC ACID: 2.6 MMOL/L (ref 0.4–2)
PERFORMED ON: ABNORMAL
POTASSIUM SERPL-SCNC: 3.8 MMOL/L (ref 3.5–5.1)
SODIUM BLD-SCNC: 137 MMOL/L (ref 136–145)
TOTAL PROTEIN: 5.6 G/DL (ref 6.4–8.2)
TSH REFLEX: 1.72 UIU/ML (ref 0.27–4.2)

## 2019-06-16 PROCEDURE — 80053 COMPREHEN METABOLIC PANEL: CPT

## 2019-06-16 PROCEDURE — 83605 ASSAY OF LACTIC ACID: CPT

## 2019-06-16 PROCEDURE — 1200000000 HC SEMI PRIVATE

## 2019-06-16 PROCEDURE — 99232 SBSQ HOSP IP/OBS MODERATE 35: CPT | Performed by: INTERNAL MEDICINE

## 2019-06-16 PROCEDURE — 2580000003 HC RX 258: Performed by: INTERNAL MEDICINE

## 2019-06-16 PROCEDURE — 36415 COLL VENOUS BLD VENIPUNCTURE: CPT

## 2019-06-16 PROCEDURE — 6370000000 HC RX 637 (ALT 250 FOR IP): Performed by: INTERNAL MEDICINE

## 2019-06-16 PROCEDURE — 6360000002 HC RX W HCPCS: Performed by: INTERNAL MEDICINE

## 2019-06-16 RX ADMIN — ISOSORBIDE DINITRATE 10 MG: 10 TABLET ORAL at 05:32

## 2019-06-16 RX ADMIN — PROCHLORPERAZINE EDISYLATE 10 MG: 5 INJECTION INTRAMUSCULAR; INTRAVENOUS at 01:09

## 2019-06-16 RX ADMIN — Medication 10 ML: at 21:31

## 2019-06-16 RX ADMIN — ASPIRIN 81 MG 81 MG: 81 TABLET ORAL at 09:38

## 2019-06-16 RX ADMIN — ENOXAPARIN SODIUM 30 MG: 100 INJECTION SUBCUTANEOUS at 09:38

## 2019-06-16 RX ADMIN — ISOSORBIDE DINITRATE 10 MG: 10 TABLET ORAL at 11:29

## 2019-06-16 RX ADMIN — ACETAMINOPHEN 650 MG: 325 TABLET ORAL at 21:31

## 2019-06-16 RX ADMIN — ACETAMINOPHEN 650 MG: 325 TABLET ORAL at 09:38

## 2019-06-16 RX ADMIN — CALCIUM CARBONATE 500 MG: 500 TABLET, CHEWABLE ORAL at 21:31

## 2019-06-16 RX ADMIN — ACETAMINOPHEN 650 MG: 325 TABLET ORAL at 16:21

## 2019-06-16 RX ADMIN — Medication 10 ML: at 09:38

## 2019-06-16 RX ADMIN — OLOPATADINE HYDROCHLORIDE 1 DROP: 1 SOLUTION/ DROPS OPHTHALMIC at 21:30

## 2019-06-16 RX ADMIN — ISOSORBIDE DINITRATE 10 MG: 10 TABLET ORAL at 16:21

## 2019-06-16 RX ADMIN — ROPINIROLE HYDROCHLORIDE 0.5 MG: 0.25 TABLET, FILM COATED ORAL at 21:31

## 2019-06-16 RX ADMIN — OLOPATADINE HYDROCHLORIDE 1 DROP: 1 SOLUTION/ DROPS OPHTHALMIC at 09:46

## 2019-06-16 RX ADMIN — ACETAMINOPHEN 650 MG: 325 TABLET ORAL at 01:40

## 2019-06-16 ASSESSMENT — PAIN DESCRIPTION - DESCRIPTORS
DESCRIPTORS: ACHING

## 2019-06-16 ASSESSMENT — PAIN DESCRIPTION - LOCATION
LOCATION: KNEE

## 2019-06-16 ASSESSMENT — PAIN DESCRIPTION - PAIN TYPE
TYPE: CHRONIC PAIN

## 2019-06-16 ASSESSMENT — PAIN SCALES - GENERAL
PAINLEVEL_OUTOF10: 4
PAINLEVEL_OUTOF10: 3
PAINLEVEL_OUTOF10: 0
PAINLEVEL_OUTOF10: 2
PAINLEVEL_OUTOF10: 2
PAINLEVEL_OUTOF10: 4
PAINLEVEL_OUTOF10: 3

## 2019-06-16 ASSESSMENT — PAIN DESCRIPTION - ORIENTATION
ORIENTATION: RIGHT;LEFT
ORIENTATION: LEFT
ORIENTATION: RIGHT;LEFT

## 2019-06-16 ASSESSMENT — PAIN DESCRIPTION - PROGRESSION
CLINICAL_PROGRESSION: GRADUALLY WORSENING
CLINICAL_PROGRESSION: GRADUALLY IMPROVING
CLINICAL_PROGRESSION: GRADUALLY WORSENING
CLINICAL_PROGRESSION: GRADUALLY IMPROVING

## 2019-06-16 ASSESSMENT — PAIN DESCRIPTION - FREQUENCY
FREQUENCY: CONTINUOUS
FREQUENCY: CONTINUOUS
FREQUENCY: INTERMITTENT

## 2019-06-16 ASSESSMENT — PAIN DESCRIPTION - ONSET
ONSET: ON-GOING
ONSET: GRADUAL

## 2019-06-16 NOTE — PROGRESS NOTES
Resting quietly in chair. Received report from Toni, [address]. Chair alarm. Call light in reach. Will cont to monitor.   Meenakshi Gearing

## 2019-06-16 NOTE — PROGRESS NOTES
WBC 13.1* 10.4   RBC 4.78 3.82*   HGB 14.4 11.8*   HCT 44.3 36.0*   MCV 92.8 94.3   RDW 16.3* 16.6*    188     BMP:   Recent Labs     06/14/19  1927 06/15/19  0649    136   K 3.8 3.4*    105   CO2 27 22   BUN 22* 17   CREATININE 1.4* 1.3     BNP: No results for input(s): BNP in the last 72 hours. PT/INR:   Recent Labs     06/14/19 1927   PROTIME 12.1   INR 1.06     APTT:  Recent Labs     06/14/19 1927   APTT 29.3     CARDIAC ENZYMES: No results for input(s): CKMB, CKMBINDEX, TROPONINI in the last 72 hours. Invalid input(s): CKTOTAL;3  FASTING LIPID PANEL:  Lab Results   Component Value Date    CHOL 87 06/12/2019    HDL 44 06/12/2019    TRIG 63 06/12/2019     LIVER PROFILE:   Recent Labs     06/14/19  1927 06/15/19  0649   * 68*   ALT 71* 48*   BILITOT 1.9* 1.3*   ALKPHOS 389* 279*         Chest imaging was reviewed by me- No acute cardiopulmonary disease.  The patient's chin obscures visualization  of the superior mediastinum and lung apices. Possible subluxation of the left glenohumeral joint.     CT a/p   No acute abnormality in the abdomen or pelvis.     CT head   No acute intracranial hemorrhage or mass effect. Extensive chronic small vessel ischemic disease. Small bilateral basal ganglia remote lacunar infarcts.       RUQ US   Unremarkable right upper quadrant ultrasound. Nonvisualization gallbladder keeping with cholecystectomy or contracted  state.  Correlate with surgical history. Nonvisualization of pancreas, obscured by overlying bowel gas.     Assessment & Plan:     Patient Active Problem List    Diagnosis Date Noted    Metabolic encephalopathy     Hypoglycemia     Elevated LFTs     Acute encephalopathy 06/14/2019    Primary osteoarthritis of both knees 05/25/2017    Chronic kidney disease (CKD), stage III (moderate) ([facility] [address] 03/07/2016    Hyperlipidemia associated with type 2 diabetes mellitus ([facility] [address] 11/30/2015    Neoplasm of uncertain behavior of skin 08/19/2011    Hypertension     Benign prostatic hyperplasia     CAD (coronary artery disease)     Type 2 diabetes mellitus without complication (HCC)     Osteoarthrosis     Restless legs syndrome     Renal failure      1. Metabolic encephalopathy secondary to hypoglycemia. The encephalopathy has now resolved. He is currently on a D5 IV drip. His sugars continue to be low. We will start him on a general diet and monitor sugars very closely. sugars now stable. d/c IVF     2. Coronary artery disease. Stable. Continue aspirin . Hold Lipitor given elevated LFTs.     3. Hypertension. His blood pressure readings have been low. Hold the triamterene hydrochlorothiazide and atenolol.     4.  Elevated LFTs. CT abdomen pelvis unremarkable. He does not have abdominal pain or tenderness. Hold Lipitor. Will obtain a right upper quadrant ultrasound - negative     5.  Generalized weakness and debility. Will obtain OT PT consult. 6.CKD. CRTN is at baseline.     7.Severe OA knees      Lovenox for DVT prophylaxis       Ot/pt  Needs SNF      Fabián Brown

## 2019-06-16 NOTE — PLAN OF CARE
Problem: Infection:  Goal: Will remain free from infection  Description  Will remain free from infection  Outcome: Ongoing     Problem: Safety:  Goal: Free from accidental physical injury  Description  Free from accidental physical injury  Outcome: Ongoing  Goal: Free from intentional harm  Description  Free from intentional harm  Outcome: Ongoing     Problem: Pain:  Goal: Patient's pain/discomfort is manageable  Description  Patient's pain/discomfort is manageable  Outcome: Ongoing  Goal: Pain level will decrease  Description  Pain level will decrease  Outcome: Ongoing  Goal: Control of acute pain  Description  Control of acute pain  Outcome: Ongoing  Goal: Control of chronic pain  Description  Control of chronic pain  Outcome: Ongoing     Problem: Skin Integrity:  Goal: Skin integrity will stabilize  Description  Skin integrity will stabilize  Outcome: Ongoing     Problem: Falls - Risk of:  Goal: Will remain free from falls  Description  Will remain free from falls  Outcome: Ongoing  Goal: Absence of physical injury  Description  Absence of physical injury  Outcome: Ongoing

## 2019-06-16 NOTE — PROGRESS NOTES
Pt requested to sit up in recliner. Chair alarm on. Pt stated his legs were still hurting. Tylenol 2 po given to pt. Pt given coffee (decaf) and vanilla pudding. Stated his heartburn and nausea was better.  Barbara Burnham

## 2019-06-16 NOTE — CARE COORDINATION
Case Management Assessment  Initial Evaluation    Date/Time of Evaluation: 6/16/2019 4:56 PM  Assessment Completed by: Jesus Andre    Patient Name: Yolande Melo  YOB: 1924  Diagnosis: Acute encephalopathy [G93.40]  Acute encephalopathy [G93.40]  Date / Time: 6/14/2019  7:12 PM  Admission status/Date:   6/14/19  Chart Reviewed: Yes      Patient Interviewed: Yes   Family Interviewed:  Yes - son, Clayton Parks      Hospitalization in the last 30 days:  No    Contacts  :     Relationship to Patient:   Phone Number:    Alternate Contact:     Relationship to Patient:     Phone Number:    Met with:    Current PCP     P. [address] required for SNF : Ova Hand        3 night stay required - Y, N    ADLS  Support Systems:    Transportation: family    Meal Preparation: self    Housing  Home Environment: trailer w/son  Steps: ramp in  Plans to Return to Present Housing: No  Other Identified Issues:     Amber Blanchard 78  Currently active with [employer] [address] : No     Passport/Waiver : No  :                      Phone Number:    Passport/Waiver Services:           Durable Medical Equipment   DME Provider:    Equipment:   Walkerx__Cane_x_RTS__ BSC_x_Shower Chair__  02__ HHN__ CPAP__  BiPap__  Hospital Bed__ W/C___ Other__rolator__      Has Home O2 in place on admit:  No  Informed of need to bring portable home O2 tank on day of discharge for nursing to connect prior to leaving:   Not Indicated  Verbalized agreement/Understanding:   Not Indicated    Community Service Affiliation  Dialysis:  No    · Name:  · Location  · Dialysis Schedule:  · Phone:   · Fax: Outpatient PT/OT: No    Cancer Center: No     CHF Clinic: No     Pulmonary Rehab: No  Pain Clinic: No  Community Mental Health: No    Wound Clinic: No     Other:     DISCHARGE PLAN:   Spoke with patient who is extremely [place]. Son, Clayton Parks also present and able to answer most questions.   Patient lives with one or other of two sons. He uses a walker to ambulate and family assists with all his needs. Therapy is recommending STR. Left list of facilities and will follow up. Explained Case Management role/services.

## 2019-06-16 NOTE — PROGRESS NOTES
PROGRESS NOTE  S:89 yrs Patient  admitted on 6/14/2019 with Acute encephalopathy [G93.40]  Acute encephalopathy [G93.40] . Liver enzymes improving. Patient still unable to name date or president. Alert. Exam:   Vitals:    06/16/19 1356   BP: 135/66   Pulse: 90   Resp: 17   Temp: 97.4 °F (36.3 °C)   SpO2: 100%      General appearance: alert, appears stated age and cooperative  HEENT: PERRLA  Neck: no adenopathy, no carotid bruit, no JVD, supple, symmetrical, trachea midline and thyroid not enlarged, symmetric, no tenderness/mass/nodules  Lungs: clear to auscultation bilaterally  Heart: regular rate and rhythm, S1, S2 normal, no murmur, click, rub or gallop  Abdomen: soft, non-tender; bowel sounds normal; no masses,  no organomegaly  Extremities: extremities normal, atraumatic, no cyanosis or edema     Medications: Reviewed    Labs:  CBC:   Recent Labs     06/14/19  1927 06/15/19  0649   WBC 13.1* 10.4   HGB 14.4 11.8*   HCT 44.3 36.0*   MCV 92.8 94.3    188     BMP:   Recent Labs     06/14/19  1927 06/15/19  0649 06/16/19  0657    136 137   K 3.8 3.4* 3.8    105 107   CO2 27 22 19*   BUN 22* 17 17   CREATININE 1.4* 1.3 1.5*     LIVER PROFILE:   Recent Labs     06/14/19  1927 06/15/19  0649 06/16/19  0657   * 68* 53*   ALT 71* 48* 42*   LIPASE 22.0  --   --    PROT 6.9 5.2* 5.6*   BILITOT 1.9* 1.3* 1.5*   ALKPHOS 389* 279* 254*     PT/INR:   Recent Labs     06/14/19 1927   INR 1.06       IMAGING:      Impression:  79 yo male with abnormal liver enzymes after being found down at home    Recommendation:  1. Liver enzymes continue to improve, awaiting echo report.         Kristin Kennedy DO  3:31 PM 6/16/2019

## 2019-06-16 NOTE — PROGRESS NOTES
Spoke to Dr. Migel Vázquez regarding blood sugar increasing to 283. Stated to switch general diet to carb control diet with repeat back. If no improvement in blood sugar this evening then to start low sliding scale insulin with repeat back.

## 2019-06-16 NOTE — PROGRESS NOTES
Pt laying in bed watching tv. C/o knee pain and PRN tylenol given. Sharif catheter dc'd per MD order. Pt tolerated well. Urinal given to patient. FSBS 181. IV fluids DC'd. Pt eating well. Call light within reach. Bed alarm on.

## 2019-06-16 NOTE — PROGRESS NOTES
Spoke to GEOFF via telephone regarding update on patient care. Yeni Goldstein stated she would like the patient to be placed for SNF at [facility] where his wife is in long term care. Stated she will make arrangements where pt will be staying with her. There homes are close to each other.

## 2019-06-17 VITALS
SYSTOLIC BLOOD PRESSURE: 118 MMHG | DIASTOLIC BLOOD PRESSURE: 72 MMHG | RESPIRATION RATE: 16 BRPM | HEART RATE: 88 BPM | WEIGHT: 147.3 LBS | TEMPERATURE: 98.5 F | BODY MASS INDEX: 24.54 KG/M2 | OXYGEN SATURATION: 96 % | HEIGHT: 65 IN

## 2019-06-17 LAB
GLUCOSE BLD-MCNC: 102 MG/DL (ref 70–99)
GLUCOSE BLD-MCNC: 181 MG/DL (ref 70–99)
PERFORMED ON: ABNORMAL
PERFORMED ON: ABNORMAL

## 2019-06-17 PROCEDURE — 6370000000 HC RX 637 (ALT 250 FOR IP): Performed by: INTERNAL MEDICINE

## 2019-06-17 PROCEDURE — 6360000002 HC RX W HCPCS: Performed by: INTERNAL MEDICINE

## 2019-06-17 PROCEDURE — 99239 HOSP IP/OBS DSCHRG MGMT >30: CPT | Performed by: INTERNAL MEDICINE

## 2019-06-17 PROCEDURE — 2580000003 HC RX 258: Performed by: INTERNAL MEDICINE

## 2019-06-17 RX ORDER — TRAMADOL HYDROCHLORIDE 50 MG/1
50 TABLET ORAL EVERY 6 HOURS PRN
Qty: 12 TABLET | Refills: 0 | Status: SHIPPED | OUTPATIENT
Start: 2019-06-17 | End: 2019-06-20

## 2019-06-17 RX ADMIN — ACETAMINOPHEN 650 MG: 325 TABLET ORAL at 09:25

## 2019-06-17 RX ADMIN — OLOPATADINE HYDROCHLORIDE 1 DROP: 1 SOLUTION/ DROPS OPHTHALMIC at 09:22

## 2019-06-17 RX ADMIN — Medication 10 ML: at 09:22

## 2019-06-17 RX ADMIN — CALCIUM CARBONATE 500 MG: 500 TABLET, CHEWABLE ORAL at 12:41

## 2019-06-17 RX ADMIN — ISOSORBIDE DINITRATE 10 MG: 10 TABLET ORAL at 09:22

## 2019-06-17 RX ADMIN — ENOXAPARIN SODIUM 30 MG: 100 INJECTION SUBCUTANEOUS at 09:22

## 2019-06-17 RX ADMIN — ASPIRIN 81 MG 81 MG: 81 TABLET ORAL at 09:22

## 2019-06-17 ASSESSMENT — PAIN DESCRIPTION - DIRECTION: RADIATING_TOWARDS: DOES NOT RADIATE

## 2019-06-17 ASSESSMENT — PAIN - FUNCTIONAL ASSESSMENT: PAIN_FUNCTIONAL_ASSESSMENT: ACTIVITIES ARE NOT PREVENTED

## 2019-06-17 ASSESSMENT — PAIN SCALES - GENERAL: PAINLEVEL_OUTOF10: 3

## 2019-06-17 ASSESSMENT — PAIN DESCRIPTION - PAIN TYPE: TYPE: CHRONIC PAIN

## 2019-06-17 ASSESSMENT — PAIN DESCRIPTION - ORIENTATION: ORIENTATION: RIGHT;LEFT

## 2019-06-17 ASSESSMENT — PAIN DESCRIPTION - FREQUENCY: FREQUENCY: INTERMITTENT

## 2019-06-17 ASSESSMENT — PAIN DESCRIPTION - ONSET: ONSET: GRADUAL

## 2019-06-17 ASSESSMENT — PAIN DESCRIPTION - DESCRIPTORS: DESCRIPTORS: ACHING;OTHER (COMMENT)

## 2019-06-17 ASSESSMENT — PAIN DESCRIPTION - LOCATION: LOCATION: LEG

## 2019-06-17 NOTE — PROGRESS NOTES
Report called to Herchel Dubin, LPN at [facility]. Discharge paperwork and instructions reviewed with LPN. Verbalized understanding. Medication changes reviewed. Pick- up time at 1430.

## 2019-06-17 NOTE — PROGRESS NOTES
Shift assessment completed. See flow sheet. Respiration easy, even and non labored. Vital signs WNL. Pt alert and oriented. Side rails up x 2. IV flushed without complications. Pt has restless legs, prn tylenol given along with nightly medication per mAR. Pt denies any needs at this time. Call light within reach. Will continue to monitor.

## 2019-06-17 NOTE — H&P
Gastroenterology Preop Assessment    Patient:   Krzysztof Cardenas   :    1924   Facility:   [facility]  Referring/PCP: Angel Bae MD  Date:     2019    Subjective:   Procedure: EGD    HPI/Reason for procedure:  Cirrhosis. Patient also complains of rectal pain. Had fissures. Taking nifedipine/lidocaine ointment, Senokot, and sitz baths. Instructed to continue ointment. Take colace instead. Patient inquiring about banding, informed not usually used to treat pain. Past Medical History:   Diagnosis Date    BPH (benign prostatic hypertrophy)     CAD (coronary artery disease)     Chronic kidney disease (CKD), stage III (moderate) (HCC)     Diabetes mellitus type 2, controlled ([facility] [address] 2015    Hyperlipidemia     Hyperlipidemia associated with type 2 diabetes mellitus ([facility] [address] 2015    Hypertension     Neoplasm of uncertain behavior of skin 2011    Osteoarthrosis     Renal failure     Restless legs syndrome     Type II or unspecified type diabetes mellitus without mention of complication, not stated as uncontrolled      History reviewed. No pertinent surgical history. Social:   Social History     Tobacco Use    Smoking status: Never Smoker    Smokeless tobacco: Never Used   Substance Use Topics    Alcohol use: No     Family: History reviewed. No pertinent family history. Scheduled Medications:    aspirin  81 mg Oral Daily    isosorbide dinitrate  10 mg Oral TID AC    olopatadine  1 drop Both Eyes BID    rOPINIRole  0.5 mg Oral Nightly    sodium chloride flush  10 mL Intravenous 2 times per day    enoxaparin  30 mg Subcutaneous Daily       Current Medications:    Prior to Admission medications    Medication Sig Start Date End Date Taking?  Authorizing Provider   traMADol (ULTRAM) 50 MG tablet TAKE 1 TABLET EVERY 6 HOURS AS NEEDED 19 Yes Reymundo Araya MD   atorvastatin (LIPITOR) 10 MG tablet TAKE ONE TABLET AT BEDTIME 06/16/19 0938    prochlorperazine (COMPAZINE) injection 10 mg, 10 mg, Intravenous, Q6H PRN, Crystal Burnham MD, 10 mg at 06/16/19 0109    glucose (GLUTOSE) 40 % oral gel 15 g, 15 g, Oral, PRN, Crystal Burnham MD    dextrose 50 % IV solution, 12.5 g, Intravenous, PRN, Crystal Burnham MD, 12.5 g at 06/15/19 1121    glucagon (rDNA) injection 1 mg, 1 mg, Intramuscular, PRN, Crystal Burnham MD    dextrose 5 % solution, 100 mL/hr, Intravenous, PRN, Crystal Burnham MD    acetaminophen (TYLENOL) tablet 650 mg, 650 mg, Oral, Q4H PRN, Erika Wood MD, 650 mg at 06/16/19 2131    calcium carbonate (TUMS) chewable tablet 500 mg, 500 mg, Oral, TID PRN, Beth Don MD, 500 mg at 06/16/19 2131      Infusions:    dextrose       PRN Medications: senna, sodium chloride flush, prochlorperazine, glucose, dextrose, glucagon (rDNA), dextrose, acetaminophen, calcium carbonate  Allergies: No Known Allergies      Objective:     Physical Exam:   /60   Pulse 96   Temp 98.7 °F (37.1 °C) (Oral)   Resp 16   Ht 5' 5\" (1.651 m)   Wt 147 lb 4.8 oz (66.8 kg)   SpO2 97%   BMI 24.51 kg/m²     HEENT: NCAT  Lungs: CTAB  CV: RRR  Abd: soft, ntd  Ext: dpi    Lab and Imaging Review   Labs:  CBC:   Recent Labs     06/14/19  1927 06/15/19  0649   WBC 13.1* 10.4   HGB 14.4 11.8*   HCT 44.3 36.0*   MCV 92.8 94.3    188     BMP:   Recent Labs     06/14/19  1927 06/15/19  0649 06/16/19  0657    136 137   K 3.8 3.4* 3.8    105 107   CO2 27 22 19*   BUN 22* 17 17   CREATININE 1.4* 1.3 1.5*     LIVER PROFILE:   Recent Labs     06/14/19  1927 06/15/19  0649 06/16/19  0657   * 68* 53*   ALT 71* 48* 42*   LIPASE 22.0  --   --    PROT 6.9 5.2* 5.6*   BILITOT 1.9* 1.3* 1.5*   ALKPHOS 389* 279* 254*     PT/INR:   Recent Labs     06/14/19 1927   INR 1.06       Pre-Procedure Assessment / Plan:  ASA: Class 3 - A patient with severe systemic disease that limits activity but is not incapacitating  Airway: Mallampati: II (soft palate, uvula, fauces visible)  Level of Sedation Plan:Deep sedation  Post Procedure plan: Return to same level of care      Plan:   1. egd    I assessed the patient and find that the patient is in satisfactory condition to proceed with the planned procedure and sedation plan. I have explained the risk, benefits, and alternatives to the procedure; the patient understands and agrees to proceed.        Florence Summers  6/17/2019

## 2019-06-17 NOTE — FLOWSHEET NOTE
06/17/19 0839   Vital Signs   Temp 99 °F (37.2 °C)   Temp Source Oral   Pulse 90   Resp 16   BP (!) 96/58   BP Location Left Arm   BP Upper/Lower Upper   MAP (mmHg) 71   Level of Consciousness 0   MEWS Score 2   Oxygen Therapy   SpO2 97 %   O2 Device None (Room air)     Shift assessment completed, see flow sheet. Morning medications given. Pt a/o x 3, disoriented to situation, but easily reoriented. Pt c/o 3/10 pain to BLE- medicated with prn Tylenol, see MAR. Respirations are easy, even, and unlabored. Bilateral lung sounds clear. Trace edema to BLE. PIV WNL. Pt denies any needs at this time. Call light and bedside table within reach. Bed alarm on. Will continue to monitor.

## 2019-06-17 NOTE — CARE COORDINATION
INTERDISCIPLINARY PLAN OF CARE CONFERENCE and DC Note    Date/Time: 2019 9:59 AM  Completed by: Ildefonso Pendleton      Patient Name:  Samanta Chacko  YOB: 1924  Admitting Diagnosis: Acute encephalopathy [G93.40]  Acute encephalopathy [G93.40]     Admit Date/Time:  2019  7:12 PM    Chart reviewed. Interdisciplinary team met to discuss patient progress and discharge plans. Disciplines included Case Management, Nursing, and Dietitian. Current Status:stable    PT/OT recommendation:SNF    Anticipated Discharge Date: tbd  Expected D/C Disposition:  Skilled nursing facility  Confirmed plan with patient and/or family Yes-pt and dtr Nicole Camejo  Discharge Plan Comments: Chart reviewed and role of dcp explained. Spoke with pt regarding PT recommendation of STR and pt is agreeable and would like [facility] as his spouse is there for LTC. Pt requesting to be in same with spouse if able. TC to Chris Samaniego @ [facility] and she can accept the pt for STR and will accommodate his request for the same room as spouse. +eCOC +HENS +bed      Home O2 in place on admit: ecf  Pt informed of need to bring portable home O2 tank on day of discharge for nursing to connect prior to leaving:  Not Indicated  Verbalized agreement/Understanding:  Not Indicated    DISCHARGE ORDER  Date/Time 2019 1:00 PM  Completed by: Ildefonso Pendleton    Patient Name: Samanta Chacko    : 1924  Admitting Diagnosis: Acute encephalopathy [G93.40]  Acute encephalopathy [G93.40]  Admit Date/Time: 2019  7:12 PM    Noted discharge order. Confirmed discharge plan with patient / family (pt and dtr Nicole aCmejo): Yes   Discharge Plan: Chart reviewed and role of dcp explained. Pt to go to [facility] for STR. Discharge orders and Continuity of Care faxed to facility: Yes  Hospital Exemption Notification System complete: Yes  Transportation arranged: Yes - [address] [facility] [address].   Patient / Family (pt) aware of  time: Yes

## 2019-06-17 NOTE — DISCHARGE INSTR - COC
Continuity of Care Form    Patient Name: Lilibeth Blanco   :  1924  MRN:  [id number]    Admit date:  2019  Discharge date:  2019    Code Status Order: Full Code   Advance Directives:   [facility] Documentation     Date/Time Healthcare Directive Type of Healthcare Directive Copy in [facility] [address] Agent's Name Healthcare Agent's Phone Number    06/15/19 8795  Yes, patient has an advance directive for healthcare treatment  Durable power of  for health care  No, copy requested from medical records  --  Macarena Nolen  [phone]          Admitting Physician:  Ranjith Vick MD  PCP: Nani Murillo MD    Discharging Nurse: Jonathan Burns Unit/Room#: 0226/0226-02  Discharging Unit Phone Number: [phone]    Emergency Contact:   Extended Emergency Contact Information  Primary Emergency Contact: Seip,Mary  Address: [address] [place] [address] Dr Sisi Willard [address] Phone: [phone]  Relation: Child  Secondary Emergency Contact: Seip,Mary  Address: [address] [place] [address] Dr Sisi Willard [address] Phone: [phone]  Relation: Child    Past Surgical History:  History reviewed. No pertinent surgical history.     Immunization History:   Immunization History   Administered Date(s) Administered    Influenza Virus Vaccine 10/16/2012, 2013, 10/28/2014, 2015    Influenza Whole 2008    Pneumococcal 13-valent Conjugate ([id number]) 2015    Pneumococcal Polysaccharide ([id number]) 2017    Varicella (Varivax) 2008       Active Problems:  Patient Active Problem List   Diagnosis Code    Hypertension I10    Benign prostatic hyperplasia N40.0    CAD (coronary artery disease) I25.10    Type 2 diabetes mellitus without complication ([facility] S79.3    Osteoarthrosis M19.90    Restless legs syndrome G25.81    Renal failure N19  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.5    Hyperlipidemia associated with type 2 diabetes mellitus (HCC) E11.69, E78.5    Chronic kidney disease (CKD), stage III (moderate) (HCC) N18.3    Primary osteoarthritis of both knees M17.0    Acute encephalopathy G20.76    Metabolic encephalopathy V52.31    Hypoglycemia E16.2    Elevated LFTs R94.5    Confusion R41.0       Isolation/Infection:   Isolation          No Isolation            Nurse Assessment:  Last Vital Signs: BP (!) 96/58   Pulse 90   Temp 99 °F (37.2 °C) (Oral)   Resp 16   Ht 5' 5\" (1.651 m)   Wt 147 lb 4.8 oz (66.8 kg)   SpO2 97%   BMI 24.51 kg/m²     Last documented pain score (0-10 scale): Pain Level: 3  Last Weight:   Wt Readings from Last 1 Encounters:   06/15/19 147 lb 4.8 oz (66.8 kg)     Mental Status:  disoriented, alert and able to concentrate and follow conversation- DISORIENTED TO SITUATION- FORGETFUL    IV Access:  - None    Nursing Mobility/ADLs:  Walking   Assisted  Transfer  Assisted  Bathing  Independent  Dressing  Independent  Toileting  Independent  Feeding  Independent  Med Admin  Assisted  Med Delivery   whole    Wound Care Documentation and Therapy:        Elimination:  Continence:   · Bowel: Yes  · Bladder: Yes  Urinary Catheter: Removal Date 6/16/19   Colostomy/Ileostomy/Ileal Conduit: No       Date of Last BM: 6    Intake/Output Summary (Last 24 hours) at 6/17/2019 1229  Last data filed at 6/17/2019 3976  Gross per 24 hour   Intake 850 ml   Output 450 ml   Net 400 ml     I/O last 3 completed shifts: In: 1622 [P.O.:1200;  I.V.:422]  Out: 950 [Urine:950]    Safety Concerns:     History of Falls (last 30 days) and At Risk for Falls    Impairments/Disabilities:      Hearing    Nutrition Therapy:  Current Nutrition Therapy:   - Oral Diet:  Carb Control 3 carbs/meal (1500kcals/day)    Routes of Feeding: Oral  Liquids: No Restrictions  Daily Fluid Restriction: no  Last Modified Barium Swallow with Video (Video

## 2019-06-21 ENCOUNTER — HOSPITAL ENCOUNTER (OUTPATIENT)
Dept: ULTRASOUND IMAGING | Age: 84
Discharge: HOME | End: 2019-06-21
Payer: COMMERCIAL

## 2019-06-21 DIAGNOSIS — R79.89 ELEVATED LFTS: ICD-10-CM

## 2019-06-21 LAB
BLOOD CULTURE, ROUTINE: NORMAL
CULTURE, BLOOD 2: NORMAL

## 2019-06-21 PROCEDURE — 76700 US EXAM ABDOM COMPLETE: CPT

## 2019-06-24 NOTE — DISCHARGE SUMMARY
Name:  Ishmael Desouza  Room:  3136/8617-32  MRN:    [id number]    Discharge Summary      This discharge summary is in conjunction with a complete physical exam done on the day of discharge. Discharging Physician: Jia Zheng       Admit: 6/14/2019  Discharge:  6/17/2019    Diagnoses this Admission    Active Problems:    Acute encephalopathy    Metabolic encephalopathy    Hypoglycemia    Elevated LFTs    Confusion  Resolved Problems:    * No resolved hospital problems. *          Procedures (Please Review Full Report for Details)        Consults      GI    HPI:      77-year-old male with a history of hypertension, type 2 diabetes, coronary artery disease and osteoarthritis was brought to the emergency room after his son found him unresponsive at home 911 was called his blood sugar was in the 70s. Continues brought to the emergency room his blood sugar was in the 40s and the patient was confused he was treated with D50. He was placed on D5 IV drip and admitted to the [facility] 5 unit. He was seen with his primary care physician a couple of days ago and was advised to stop Amaryl and the patient assures me that he did stop taking Amaryl. He is not on any other diabetic medications. The family has also noticed a functional decline in the past couple of months or so. /72   Pulse 88   Temp 98.5 °F (36.9 °C) (Oral)   Resp 16   Ht 5' 5\" (1.651 m)   Wt 147 lb 4.8 oz (66.8 kg)   SpO2 96%   BMI 24.51 kg/m²     Physical Exam   Constitutional: He appears well-developed and well-nourished. HENT:   Head: Normocephalic and atraumatic. Eyes: Pupils are equal, round, and reactive to light. Conjunctivae and EOM are normal.   Neck: Normal range of motion. Neck supple. No JVD present. No thyromegaly present. Cardiovascular: Normal rate and regular rhythm. No murmur heard. Pulmonary/Chest: Effort normal and breath sounds normal. No respiratory distress.             Hospital Course    1.  Metabolic encephalopathy secondary to hypoglycemia.  The encephalopathy has now resolved.  He was treated with D5 IV drip.  His sugars continue to be low.  We started him on a general diet and monitor sugars very closely. sugars now. No further hypoglycemia.     2.  Coronary artery disease.  Stable.  Continue aspirin .  Hold Lipitor given elevated LFTs.     3.  Hypertension.  His blood pressure readings have been low.  Hold the triamterene hydrochlorothiazide and atenolol. Discontinue at discharge with close OP monitoring.     4.  Elevated LFTs.  CT abdomen pelvis unremarkable.  He does not have abdominal pain or tenderness.  Held Lipitor.  Will obtain a right upper quadrant ultrasound - negative     5.  Generalized weakness and debility.  PT and OT saw him. Sent to SNF for rehab.     6. CKD. CRTN is at baseline.     7.Severe OA knees           Lab Data:  CBC:        Recent Labs     06/14/19  1927 06/15/19  0649   WBC 13.1* 10.4   RBC 4.78 3.82*   HGB 14.4 11.8*   HCT 44.3 36.0*   MCV 92.8 94.3   RDW 16.3* 16.6*    188      BMP:        Recent Labs     06/14/19  1927 06/15/19  0649    136   K 3.8 3.4*    105   CO2 27 22   BUN 22* 17   CREATININE 1.4* 1.3      BNP: No results for input(s): BNP in the last 72 hours. PT/INR:       Recent Labs     06/14/19 1927   PROTIME 12.1   INR 1.06      APTT:      Recent Labs     06/14/19 1927   APTT 29.3      CARDIAC ENZYMES: No results for input(s): CKMB, CKMBINDEX, TROPONINI in the last 72 hours.     Invalid input(s): CKTOTAL;3  FASTING LIPID PANEL:        Lab Results   Component Value Date     CHOL 87 06/12/2019     HDL 44 06/12/2019     TRIG 63 06/12/2019      LIVER PROFILE:        Recent Labs     06/14/19  1927 06/15/19  0649   * 68*   ALT 71* 48*   BILITOT 1.9* 1.3*   ALKPHOS 389* 279*       US GALLBLADDER RUQ   Final Result   Unremarkable right upper quadrant ultrasound. Nonvisualization gallbladder keeping with cholecystectomy or contracted   state. Correlate with surgical history. Nonvisualization of pancreas, obscured by overlying bowel gas. CT ABDOMEN PELVIS W IV CONTRAST   Final Result   No acute abnormality in the abdomen or pelvis. CT Head WO Contrast   Final Result   No acute intracranial hemorrhage or mass effect. Extensive chronic small vessel ischemic disease. Small bilateral basal ganglia remote lacunar infarcts. XR CHEST PORTABLE   Final Result   No acute cardiopulmonary disease. The patient's chin obscures visualization   of the superior mediastinum and lung apices. Possible subluxation of the left glenohumeral joint. Discharge Medications     Medication List      CONTINUE taking these medications    aspirin 81 MG tablet     Nova Ratio  Use for ambulation     isosorbide dinitrate 10 MG tablet  Commonly known as:  ISORDIL  TAKE 1 TABLET THREE TIMES DAILY     olopatadine 0.1 % ophthalmic solution  Commonly known as:  PATANOL  PLACE 1 DROP INTO EACH EYE TWICE DAILY     rOPINIRole 1 MG tablet  Commonly known as:  REQUIP  TAKE ONE TABLET AT BEDTIME     senna 8.6 MG tablet  Commonly known as:  SENOKOT  Take 2 tablets by mouth 2 times daily        STOP taking these medications    atenolol 50 MG tablet  Commonly known as:  TENORMIN     atorvastatin 10 MG tablet  Commonly known as:  LIPITOR     hydrALAZINE 25 MG tablet  Commonly known as:  APRESOLINE     traMADol 50 MG tablet  Commonly known as:  ULTRAM     triamterene-hydrochlorothiazide 37.5-25 MG per capsule  Commonly known as:  DYAZIDE        ASK your doctor about these medications    traMADol 50 MG tablet  Commonly known as:  ULTRAM  Take 1 tablet by mouth every 6 hours as needed for Pain for up to 3 days. Ask about: Should I take this medication?            Where to Get Your Medications      You can get these medications from any pharmacy    Bring a paper prescription for each of these medications  · traMADol 50 MG tablet Discharge Condition/Location: Stable    Follow Up: Follow up with PCP.     More than 30 mts spent      [address] 6/24/2019 2:40 PM

## 2019-07-19 ENCOUNTER — TELEPHONE (OUTPATIENT)
Dept: INTERNAL MEDICINE CLINIC | Age: 84
End: 2019-07-19

## 2019-07-21 ENCOUNTER — HOSPITAL ENCOUNTER (EMERGENCY)
Age: 84
Discharge: HOME | End: 2019-07-21
Attending: EMERGENCY MEDICINE
Payer: MEDICARE

## 2019-07-21 VITALS
TEMPERATURE: 97.6 F | SYSTOLIC BLOOD PRESSURE: 132 MMHG | RESPIRATION RATE: 18 BRPM | BODY MASS INDEX: 26.33 KG/M2 | HEIGHT: 65 IN | DIASTOLIC BLOOD PRESSURE: 74 MMHG | WEIGHT: 158 LBS | HEART RATE: 78 BPM

## 2019-07-21 DIAGNOSIS — R33.9 URINARY RETENTION: Primary | ICD-10-CM

## 2019-07-21 DIAGNOSIS — N17.9 ACUTE KIDNEY INJURY SUPERIMPOSED ON CKD (HCC): ICD-10-CM

## 2019-07-21 DIAGNOSIS — N18.9 ACUTE KIDNEY INJURY SUPERIMPOSED ON CKD (HCC): ICD-10-CM

## 2019-07-21 LAB
A/G RATIO: 1 (ref 1.1–2.2)
ALBUMIN SERPL-MCNC: 2.8 G/DL (ref 3.4–5)
ALP BLD-CCNC: 88 U/L (ref 40–129)
ALT SERPL-CCNC: 7 U/L (ref 10–40)
ANION GAP SERPL CALCULATED.3IONS-SCNC: 8 MMOL/L (ref 3–16)
ANION GAP SERPL CALCULATED.3IONS-SCNC: 9 MMOL/L (ref 3–16)
AST SERPL-CCNC: 18 U/L (ref 15–37)
BACTERIA: ABNORMAL /HPF
BASOPHILS ABSOLUTE: 0.1 K/UL (ref 0–0.2)
BASOPHILS RELATIVE PERCENT: 0.9 %
BILIRUB SERPL-MCNC: 0.5 MG/DL (ref 0–1)
BILIRUBIN URINE: NEGATIVE
BLOOD, URINE: ABNORMAL
BUN BLDV-MCNC: 28 MG/DL (ref 7–20)
BUN BLDV-MCNC: 29 MG/DL (ref 7–20)
CALCIUM SERPL-MCNC: 8.5 MG/DL (ref 8.3–10.6)
CALCIUM SERPL-MCNC: 8.7 MG/DL (ref 8.3–10.6)
CHLORIDE BLD-SCNC: 107 MMOL/L (ref 99–110)
CHLORIDE BLD-SCNC: 110 MMOL/L (ref 99–110)
CLARITY: CLEAR
CO2: 23 MMOL/L (ref 21–32)
CO2: 26 MMOL/L (ref 21–32)
COLOR: YELLOW
CREAT SERPL-MCNC: 2 MG/DL (ref 0.8–1.3)
CREAT SERPL-MCNC: 2.3 MG/DL (ref 0.8–1.3)
EOSINOPHILS ABSOLUTE: 0.1 K/UL (ref 0–0.6)
EOSINOPHILS RELATIVE PERCENT: 1 %
EPITHELIAL CELLS, UA: ABNORMAL /HPF
GFR AFRICAN AMERICAN: 32
GFR AFRICAN AMERICAN: 38
GFR NON-AFRICAN AMERICAN: 27
GFR NON-AFRICAN AMERICAN: 31
GLOBULIN: 2.8 G/DL
GLUCOSE BLD-MCNC: 137 MG/DL (ref 70–99)
GLUCOSE BLD-MCNC: 180 MG/DL (ref 70–99)
GLUCOSE URINE: NEGATIVE MG/DL
HCT VFR BLD CALC: 31.6 % (ref 40.5–52.5)
HEMOGLOBIN: 10.4 G/DL (ref 13.5–17.5)
KETONES, URINE: NEGATIVE MG/DL
LEUKOCYTE ESTERASE, URINE: NEGATIVE
LYMPHOCYTES ABSOLUTE: 1 K/UL (ref 1–5.1)
LYMPHOCYTES RELATIVE PERCENT: 13.7 %
MCH RBC QN AUTO: 29.8 PG (ref 26–34)
MCHC RBC AUTO-ENTMCNC: 32.8 G/DL (ref 31–36)
MCV RBC AUTO: 90.9 FL (ref 80–100)
MICROSCOPIC EXAMINATION: YES
MONOCYTES ABSOLUTE: 0.9 K/UL (ref 0–1.3)
MONOCYTES RELATIVE PERCENT: 11.6 %
NEUTROPHILS ABSOLUTE: 5.3 K/UL (ref 1.7–7.7)
NEUTROPHILS RELATIVE PERCENT: 72.8 %
NITRITE, URINE: NEGATIVE
PDW BLD-RTO: 14.9 % (ref 12.4–15.4)
PH UA: 7 (ref 5–8)
PLATELET # BLD: 236 K/UL (ref 135–450)
PMV BLD AUTO: 7.1 FL (ref 5–10.5)
POTASSIUM REFLEX MAGNESIUM: 4.4 MMOL/L (ref 3.5–5.1)
POTASSIUM REFLEX MAGNESIUM: 4.4 MMOL/L (ref 3.5–5.1)
PROTEIN UA: NEGATIVE MG/DL
RBC # BLD: 3.48 M/UL (ref 4.2–5.9)
RBC UA: ABNORMAL /HPF (ref 0–2)
SODIUM BLD-SCNC: 141 MMOL/L (ref 136–145)
SODIUM BLD-SCNC: 142 MMOL/L (ref 136–145)
SPECIFIC GRAVITY UA: 1.01 (ref 1–1.03)
TOTAL PROTEIN: 5.6 G/DL (ref 6.4–8.2)
URINE REFLEX TO CULTURE: ABNORMAL
URINE TYPE: ABNORMAL
UROBILINOGEN, URINE: 0.2 E.U./DL
WBC # BLD: 7.3 K/UL (ref 4–11)
WBC UA: ABNORMAL /HPF (ref 0–5)

## 2019-07-21 PROCEDURE — 99283 EMERGENCY DEPT VISIT LOW MDM: CPT

## 2019-07-21 PROCEDURE — 51702 INSERT TEMP BLADDER CATH: CPT

## 2019-07-21 PROCEDURE — 85025 COMPLETE CBC W/AUTO DIFF WBC: CPT

## 2019-07-21 PROCEDURE — 81001 URINALYSIS AUTO W/SCOPE: CPT

## 2019-07-21 PROCEDURE — 6370000000 HC RX 637 (ALT 250 FOR IP): Performed by: EMERGENCY MEDICINE

## 2019-07-21 PROCEDURE — 80053 COMPREHEN METABOLIC PANEL: CPT

## 2019-07-21 PROCEDURE — 51798 US URINE CAPACITY MEASURE: CPT

## 2019-07-21 PROCEDURE — 2580000003 HC RX 258: Performed by: EMERGENCY MEDICINE

## 2019-07-21 PROCEDURE — 36415 COLL VENOUS BLD VENIPUNCTURE: CPT

## 2019-07-21 RX ORDER — TAMSULOSIN HYDROCHLORIDE 0.4 MG/1
0.4 CAPSULE ORAL ONCE
Status: COMPLETED | OUTPATIENT
Start: 2019-07-21 | End: 2019-07-21

## 2019-07-21 RX ORDER — 0.9 % SODIUM CHLORIDE 0.9 %
1000 INTRAVENOUS SOLUTION INTRAVENOUS ONCE
Status: COMPLETED | OUTPATIENT
Start: 2019-07-21 | End: 2019-07-21

## 2019-07-21 RX ORDER — TAMSULOSIN HYDROCHLORIDE 0.4 MG/1
0.4 CAPSULE ORAL DAILY
Qty: 30 CAPSULE | Refills: 0 | Status: SHIPPED | OUTPATIENT
Start: 2019-07-21 | End: 2019-01-01

## 2019-07-21 RX ADMIN — TAMSULOSIN HYDROCHLORIDE 0.4 MG: 0.4 CAPSULE ORAL at 13:30

## 2019-07-21 RX ADMIN — SODIUM CHLORIDE 1000 ML: 9 INJECTION, SOLUTION INTRAVENOUS at 10:39

## 2019-07-21 NOTE — ED NOTES
Pt noted with approx 1300 cc of thao urine in folay bag.  Catheter changed to leg bag without incident, cath care instructions reviewed with pt & family with verbalized understanding     Preston Rodriguez RN  07/21/19 8982

## 2019-07-21 NOTE — ED PROVIDER NOTES
insecurity:     Worry: Not on file     Inability: Not on file    Transportation needs:     Medical: Not on file     Non-medical: Not on file   Tobacco Use    Smoking status: Never Smoker    Smokeless tobacco: Never Used   Substance and Sexual Activity    Alcohol use: No    Drug use: No    Sexual activity: Not Currently     Partners: Female   Lifestyle    Physical activity:     Days per week: Not on file     Minutes per session: Not on file    Stress: Not on file   Relationships    Social connections:     Talks on phone: Not on file     Gets together: Not on file     Attends Mandaeism service: Not on file     Active member of club or organization: Not on file     Attends meetings of clubs or organizations: Not on file     Relationship status: Not on file    Intimate partner violence:     Fear of current or ex partner: Not on file     Emotionally abused: Not on file     Physically abused: Not on file     Forced sexual activity: Not on file   Other Topics Concern    Not on file   Social History Narrative    Not on file     No current facility-administered medications for this encounter. Current Outpatient Medications   Medication Sig Dispense Refill    isosorbide dinitrate (ISORDIL) 10 MG tablet TAKE 1 TABLET THREE TIMES DAILY 90 tablet 2    rOPINIRole (REQUIP) 1 MG tablet TAKE ONE TABLET AT BEDTIME 30 tablet 2    senna (SENOKOT) 8.6 MG tablet Take 2 tablets by mouth 2 times daily 120 tablet 11    olopatadine (PATANOL) 0.1 % ophthalmic solution PLACE 1 DROP INTO EACH EYE TWICE DAILY 5 mL 0    Misc. Devices (MARIA ISABEL ROLLING WALKER ELITE) MISC Use for ambulation 1 each 0    aspirin 81 MG tablet Take 1 tablet by mouth daily.  30 tablet      No Known Allergies    REVIEW OF SYSTEMS  10 systems reviewed, pertinent positives per HPI otherwise noted to be negative    PHYSICAL EXAM  /63   Pulse 92   Temp 97.6 °F (36.4 °C) (Oral)   Resp 14   Ht 5' 5\" (1.651 m)   Wt 158 lb (71.7 kg)   BMI 26.29

## 2019-07-21 NOTE — ED NOTES
Pt denies N/V, hematuria, flank pain, fever or further c/o's. States \"I just can't pee right. \" MD made aware, family @ bedside     Sly Morales RN  07/21/19 1024

## 2019-07-22 ENCOUNTER — TELEPHONE (OUTPATIENT)
Dept: INTERNAL MEDICINE CLINIC | Age: 84
End: 2019-07-22

## 2019-07-22 NOTE — TELEPHONE ENCOUNTER
Left message informing daughter that script will be sent to [facility] and to call back with name of eye drop.

## 2019-07-22 NOTE — TELEPHONE ENCOUNTER
----- Message from Deborah Paul MD sent at 7/22/2019  4:49 PM EDT -----  Contact: hospitals [phone]  Yes on the walker  ----- Message -----  From: Symone Holland: 7/22/2019   4:41 PM EDT  To: Deborah Paul MD    Daughter said that patient went back to ER this weekend and was diagnosed with an Enlarged prostate and had a catheter placed. She is wanting to know if you can write a script for a rolling walker? She would like it sent to [facility]. States she did not know the name of the eye drop and will let us know later what the name is   ----- Message -----  From: Malissa Brittle  Sent: 7/22/2019   9:00 AM EDT  To: Emily De Leon for daughter to return call.  ----- Message -----  From: Deborah Paul MD  Sent: 7/22/2019   8:33 AM EDT  To: Malissa Brittle    Yes that would be ok. What eye drops  ----- Message -----  From: Malissa Brittle  Sent: 7/19/2019   2:09 PM EDT  To: Deborah Paul MD    Pt's daughter informed to continue what NH advised per Dr. Bethanie Anne.  Pt's daughter wanting to know if you also agree with this and if pt should be on eye drops? States pt was given eye drops in nursing home and you had prescribed them a long time ago. Will need them called in if you want pt on the eye drops. Please advise.  ----- Message -----  From: Michel Keys MD  Sent: 7/19/2019   1:41 PM EDT  To: Beronica Anne what NH advised   ----- Message -----  From: Malissa Brittle  Sent: 7/19/2019  12:34 PM EDT  To: MD Dr. Tres Pereyra pt    Pt went to nursing home/rehab for a few weeks after being in the hospital.  Pt is home now but daughter is concerned because the nursing home took him off of a lot of his pills and did not add any new meds. Daughter wants to know if she should do what the nursing home instructed or make any changes? Or just do what the nursing home instructed until Dr. Tres Elias can advise Monday? Please advise.     Pt was taken off of

## 2019-07-26 ENCOUNTER — CLINICAL DOCUMENTATION (OUTPATIENT)
Dept: CASE MANAGEMENT | Age: 84
End: 2019-07-26

## 2019-07-30 ENCOUNTER — HOSPITAL ENCOUNTER (EMERGENCY)
Age: 84
Discharge: HOME | End: 2019-07-30
Attending: EMERGENCY MEDICINE
Payer: MEDICARE

## 2019-07-30 VITALS
HEART RATE: 87 BPM | HEIGHT: 65 IN | SYSTOLIC BLOOD PRESSURE: 137 MMHG | OXYGEN SATURATION: 98 % | TEMPERATURE: 99.5 F | BODY MASS INDEX: 26.33 KG/M2 | DIASTOLIC BLOOD PRESSURE: 74 MMHG | RESPIRATION RATE: 17 BRPM | WEIGHT: 158 LBS

## 2019-07-30 DIAGNOSIS — T83.511A URINARY TRACT INFECTION ASSOCIATED WITH INDWELLING URETHRAL CATHETER, INITIAL ENCOUNTER (HCC): Primary | ICD-10-CM

## 2019-07-30 DIAGNOSIS — N39.0 URINARY TRACT INFECTION ASSOCIATED WITH INDWELLING URETHRAL CATHETER, INITIAL ENCOUNTER (HCC): Primary | ICD-10-CM

## 2019-07-30 LAB
ANION GAP SERPL CALCULATED.3IONS-SCNC: 8 MMOL/L (ref 3–16)
BACTERIA: ABNORMAL /HPF
BASOPHILS ABSOLUTE: 0.1 K/UL (ref 0–0.2)
BASOPHILS RELATIVE PERCENT: 0.9 %
BILIRUBIN URINE: NEGATIVE
BLOOD, URINE: ABNORMAL
BUN BLDV-MCNC: 25 MG/DL (ref 7–20)
CALCIUM SERPL-MCNC: 8.5 MG/DL (ref 8.3–10.6)
CHLORIDE BLD-SCNC: 105 MMOL/L (ref 99–110)
CLARITY: ABNORMAL
CO2: 25 MMOL/L (ref 21–32)
COLOR: ABNORMAL
CREAT SERPL-MCNC: 1 MG/DL (ref 0.8–1.3)
CRYSTALS, UA: ABNORMAL
EOSINOPHILS ABSOLUTE: 0.3 K/UL (ref 0–0.6)
EOSINOPHILS RELATIVE PERCENT: 2.9 %
GFR AFRICAN AMERICAN: >60
GFR NON-AFRICAN AMERICAN: >60
GLUCOSE BLD-MCNC: 263 MG/DL (ref 70–99)
GLUCOSE URINE: NEGATIVE MG/DL
HCT VFR BLD CALC: 28.1 % (ref 40.5–52.5)
HEMOGLOBIN: 9.1 G/DL (ref 13.5–17.5)
KETONES, URINE: NEGATIVE MG/DL
LACTIC ACID: 1.7 MMOL/L (ref 0.4–2)
LEUKOCYTE ESTERASE, URINE: ABNORMAL
LYMPHOCYTES ABSOLUTE: 1.4 K/UL (ref 1–5.1)
LYMPHOCYTES RELATIVE PERCENT: 15.8 %
MCH RBC QN AUTO: 28.8 PG (ref 26–34)
MCHC RBC AUTO-ENTMCNC: 32.5 G/DL (ref 31–36)
MCV RBC AUTO: 88.5 FL (ref 80–100)
MICROSCOPIC EXAMINATION: YES
MONOCYTES ABSOLUTE: 0.9 K/UL (ref 0–1.3)
MONOCYTES RELATIVE PERCENT: 10.6 %
NEUTROPHILS ABSOLUTE: 6.1 K/UL (ref 1.7–7.7)
NEUTROPHILS RELATIVE PERCENT: 69.8 %
NITRITE, URINE: POSITIVE
PDW BLD-RTO: 14.9 % (ref 12.4–15.4)
PH UA: >=9 (ref 5–8)
PLATELET # BLD: 246 K/UL (ref 135–450)
PMV BLD AUTO: 7.4 FL (ref 5–10.5)
POTASSIUM SERPL-SCNC: 4.1 MMOL/L (ref 3.5–5.1)
PROTEIN UA: 100 MG/DL
RBC # BLD: 3.17 M/UL (ref 4.2–5.9)
RBC UA: >100 /HPF (ref 0–2)
SODIUM BLD-SCNC: 138 MMOL/L (ref 136–145)
SPECIFIC GRAVITY UA: 1.01 (ref 1–1.03)
TOTAL CK: 50 U/L (ref 39–308)
URINE REFLEX TO CULTURE: YES
URINE TYPE: ABNORMAL
UROBILINOGEN, URINE: 1 E.U./DL
WBC # BLD: 8.8 K/UL (ref 4–11)
WBC UA: ABNORMAL /HPF (ref 0–5)

## 2019-07-30 PROCEDURE — 85025 COMPLETE CBC W/AUTO DIFF WBC: CPT

## 2019-07-30 PROCEDURE — 99283 EMERGENCY DEPT VISIT LOW MDM: CPT

## 2019-07-30 PROCEDURE — 87086 URINE CULTURE/COLONY COUNT: CPT

## 2019-07-30 PROCEDURE — 6360000002 HC RX W HCPCS: Performed by: EMERGENCY MEDICINE

## 2019-07-30 PROCEDURE — 82550 ASSAY OF CK (CPK): CPT

## 2019-07-30 PROCEDURE — 36415 COLL VENOUS BLD VENIPUNCTURE: CPT

## 2019-07-30 PROCEDURE — 51702 INSERT TEMP BLADDER CATH: CPT

## 2019-07-30 PROCEDURE — 2580000003 HC RX 258: Performed by: EMERGENCY MEDICINE

## 2019-07-30 PROCEDURE — 87040 BLOOD CULTURE FOR BACTERIA: CPT

## 2019-07-30 PROCEDURE — 80048 BASIC METABOLIC PNL TOTAL CA: CPT

## 2019-07-30 PROCEDURE — 2580000003 HC RX 258

## 2019-07-30 PROCEDURE — 96374 THER/PROPH/DIAG INJ IV PUSH: CPT

## 2019-07-30 PROCEDURE — 83605 ASSAY OF LACTIC ACID: CPT

## 2019-07-30 PROCEDURE — 81001 URINALYSIS AUTO W/SCOPE: CPT

## 2019-07-30 PROCEDURE — 87186 SC STD MICRODIL/AGAR DIL: CPT

## 2019-07-30 PROCEDURE — 87077 CULTURE AEROBIC IDENTIFY: CPT

## 2019-07-30 RX ORDER — CEFUROXIME AXETIL 500 MG/1
500 TABLET ORAL 2 TIMES DAILY
Qty: 14 TABLET | Refills: 0 | Status: SHIPPED | OUTPATIENT
Start: 2019-07-30 | End: 2019-08-06

## 2019-07-30 RX ORDER — 0.9 % SODIUM CHLORIDE 0.9 %
1000 INTRAVENOUS SOLUTION INTRAVENOUS ONCE
Status: COMPLETED | OUTPATIENT
Start: 2019-07-30 | End: 2019-07-30

## 2019-07-30 RX ORDER — SODIUM CHLORIDE 9 MG/ML
INJECTION, SOLUTION INTRAVENOUS
Status: COMPLETED
Start: 2019-07-30 | End: 2019-07-30

## 2019-07-30 RX ADMIN — Medication 1000 ML: at 02:28

## 2019-07-30 RX ADMIN — SODIUM CHLORIDE 1000 ML: 9 INJECTION, SOLUTION INTRAVENOUS at 02:28

## 2019-07-30 RX ADMIN — CEFTRIAXONE SODIUM 1 G: 1 INJECTION, POWDER, FOR SOLUTION INTRAMUSCULAR; INTRAVENOUS at 02:28

## 2019-07-30 ASSESSMENT — ENCOUNTER SYMPTOMS
TROUBLE SWALLOWING: 0
STRIDOR: 0
BACK PAIN: 0
FACIAL SWELLING: 0
BLOOD IN STOOL: 0
SHORTNESS OF BREATH: 0
WHEEZING: 0
VOMITING: 0
ABDOMINAL PAIN: 0
NAUSEA: 0
COLOR CHANGE: 0
VOICE CHANGE: 0

## 2019-07-30 NOTE — ED NOTES
Replaced urinary catheter with 16F and applied leg bag. Pt verbalizes an understanding of use and need for f/u with urology. Pt unable to void without catheter.   Urine remains dark in color, however no longer purple/red     Sinhg Barlow RN  07/30/19 0376

## 2019-07-30 NOTE — ED PROVIDER NOTES
[facility]  eMERGENCY dEPARTMENT eNCOUnter      Pt Name: Kim Wing  MRN: [id number]  [place] 9/6/1924  Date of evaluation: 7/30/2019  Provider: Marcela Urbina MD    [address] [place] [address]       Chief Complaint   Patient presents with    Urinary Tract Infection     concerned with color of urine in catheter bag         HISTORY OF PRESENT ILLNESS   (Location/Symptom, Timing/Onset, Context/Setting, Quality, Duration, Modifying Factors, Severity)  Note limiting factors. Kim Wing is a 80 y.o. male with hx of BPH and recent ED visit 9 days ago for urinary retention with Sharif catheter was placed who presents with 1 day of darkened urine in Sharif bag. The patient denies any fever, pain with urination, or clogging of the Sharif/decreased urine outflow but does report that his urine is significantly dark and today making him concerned of a possible infection. He denies any pain at this time. Reports the symptom was sudden, severe, constant, and unchanged. He denies any aggravating or alleviating symptoms. HPI    Nursing Notes were reviewed. REVIEW OFSYSTEMS    (2-9 systems for level 4, 10 or more for level 5)     Review of Systems   Constitutional: Negative for appetite change, fever and unexpected weight change. HENT: Negative for facial swelling, trouble swallowing and voice change. Respiratory: Negative for shortness of breath, wheezing and stridor. Cardiovascular: Negative for chest pain and palpitations. Gastrointestinal: Negative for abdominal pain, blood in stool, nausea and vomiting. Genitourinary: Negative for dysuria. Urine discoloration   Musculoskeletal: Negative for back pain, gait problem and neck pain. Skin: Negative for color change and wound. Neurological: Negative for seizures, syncope and speech difficulty. Psychiatric/Behavioral: Negative for self-injury and suicidal ideas.        Except as noted above the remainder of the review of systems [id number]   Phone [phone]       All otherlabs were within normal range or not returned as of this dictation. EMERGENCY DEPARTMENT COURSE and DIFFERENTIAL DIAGNOSIS/MDM:   Vitals:    Vitals:    07/30/19 0042 07/30/19 0237   BP: 135/85 137/74   Pulse: 103 87   Resp: 16 17   Temp: 99.5 °F (37.5 °C)    TempSrc: Oral    SpO2: 97% 98%   Weight: 158 lb (71.7 kg)    Height: 5' 5\" (1.651 m)          MDM  Patient's urine is concerning for hematuria and infection and urinalysis does show nitrite positive urine very suggestive of infection. The patient does not have a fever, his white blood cell count is normal, lactic acid is normal, there is no signs of endorgan damage with the patient's creatinine actually substantially improved from his recent ED visit. Urinalysis is sent for culture and the patient is given 1 dose of ceftriaxone in the emergency department and discharged home on Ceftin. The patient's infected Sharif catheter is removed and he is allowed an opportunity to attempt to urinate on his own but is unsuccessful. A new Sharif is placed and the patient is discharged home with referral to urology. The importance of urology outpatient follow-up was stressed the patient and family. They state understanding and agreement with this plan and are discharged home with standard ER return precautions. I do not suspect pyonephritis, sepsis, obstructing stone and therefore I feel this is a reasonable disposition. Procedures    FINAL IMPRESSION      1. Urinary tract infection associated with indwelling urethral catheter, initial encounter [facility])          DISPOSITION/PLAN   DISPOSITION Decision To Discharge 07/30/2019 03:34:48 AM      PATIENT REFERRED TO:  Rosa Maria Byers, [address]  [phone]    In 2 days      [address]  [facility] Emergency Department  [address] [address]  [phone]    If symptoms worsen      DISCHARGE MEDICATIONS:  New Prescriptions    CEFUROXIME (CEFTIN) 500 MG TABLET    Take 1 tablet by mouth 2 times daily for 7 days          (Please note that portions of this note were completed with a voice recognition program.  Efforts were made to edit the dictations but occasionally words aremis-transcribed. )    Clif Hodge MD (electronically signed)  Attending Emergency Physician           Clif Hodge MD  07/30/19 6110

## 2019-08-01 LAB
ORGANISM: ABNORMAL
URINE CULTURE, ROUTINE: ABNORMAL
URINE CULTURE, ROUTINE: ABNORMAL

## 2019-08-02 NOTE — RESULT ENCOUNTER NOTE
Culture reviewed, please contact patient and inform them of the results. Ceftin not specifically tested on sensitivities.   However, if symptoms are not improving, patient be switched to Bactrim DS, 1 tablet p.o. twice daily for 5 days

## 2019-08-04 LAB
BLOOD CULTURE, ROUTINE: NORMAL
CULTURE, BLOOD 2: NORMAL

## 2019-08-13 ENCOUNTER — ANESTHESIA EVENT (OUTPATIENT)
Dept: OPERATING ROOM | Age: 84
End: 2019-08-13
Payer: MEDICARE

## 2019-08-13 RX ORDER — TRIAMTERENE AND HYDROCHLOROTHIAZIDE 37.5; 25 MG/1; MG/1
1 TABLET ORAL DAILY
COMMUNITY
End: 2019-01-01

## 2019-08-13 NOTE — PROGRESS NOTES
Patient did not see PCP for Pre-op physical spoke with Hugo Wyman she states will talk to Yo Riggs and let me know. Franklin Sanchez

## 2019-08-13 NOTE — ANESTHESIA PRE PROCEDURE
No Known Allergies    Problem List:    Patient Active Problem List   Diagnosis Code    Hypertension I10    Benign prostatic hyperplasia N40.0    CAD (coronary artery disease) I25.10    Type 2 diabetes mellitus without complication ([facility] E70.3    Osteoarthrosis M19.90    Restless legs syndrome G25.81    Renal failure N19    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.5    Hyperlipidemia associated with type 2 diabetes mellitus (HCC) E11.69, E78.5    Chronic kidney disease (CKD), stage III (moderate) (HCC) N18.3    Primary osteoarthritis of both knees M17.0    Acute encephalopathy L44.25    Metabolic encephalopathy X40.87    Hypoglycemia E16.2    Elevated LFTs R94.5    Confusion R41.0       Past Medical History:        Diagnosis Date    BPH (benign prostatic hypertrophy)     CAD (coronary artery disease)     Chronic kidney disease (CKD), stage III (moderate) (HCC)     Diabetes mellitus type 2, controlled ([facility] 7/28/2015    Hyperlipidemia     Hyperlipidemia associated with type 2 diabetes mellitus ([facility] 11/30/2015    Hypertension     Neoplasm of uncertain behavior of skin 8/19/2011    Osteoarthrosis     Renal failure     Restless legs syndrome     Type II or unspecified type diabetes mellitus without mention of complication, not stated as uncontrolled        Past Surgical History:  No past surgical history on file. Social History:    Social History     Tobacco Use    Smoking status: Never Smoker    Smokeless tobacco: Never Used   Substance Use Topics    Alcohol use: No                                Counseling given: Not Answered      Vital Signs (Current): There were no vitals filed for this visit.                                            BP Readings from Last 3 Encounters:   07/30/19 137/74   07/21/19 132/74   06/17/19 118/72       NPO Status:                                                                                 BMI:   Wt Readings from Last 3 Encounters:   07/30/19 158 lb (71.7 kg)   07/21/19 158 lb (71.7 kg)   06/15/19 147 lb 4.8 oz (66.8 kg)     There is no height or weight on file to calculate BMI.    CBC:   Lab Results   Component Value Date    WBC 8.8 07/30/2019    RBC 3.17 07/30/2019    HGB 9.1 07/30/2019    HCT 28.1 07/30/2019    MCV 88.5 07/30/2019    RDW 14.9 07/30/2019     07/30/2019       CMP:   Lab Results   Component Value Date     07/30/2019    K 4.1 07/30/2019    K 4.4 07/21/2019     07/30/2019    CO2 25 07/30/2019    BUN 25 07/30/2019    CREATININE 1.0 07/30/2019    GFRAA >60 07/30/2019    AGRATIO 1.0 07/21/2019    LABGLOM >60 07/30/2019    LABGLOM 43 03/07/2016    GLUCOSE 263 07/30/2019    GLUCOSE 82 12/01/2011    PROT 5.6 07/21/2019    PROT 7.0 01/15/2013    CALCIUM 8.5 07/30/2019    BILITOT 0.5 07/21/2019    ALKPHOS 88 07/21/2019    AST 18 07/21/2019    ALT 7 07/21/2019       POC Tests: No results for input(s): POCGLU, POCNA, POCK, POCCL, POCBUN, POCHEMO, POCHCT in the last 72 hours.     Coags:   Lab Results   Component Value Date    PROTIME 12.1 06/14/2019    INR 1.06 06/14/2019    APTT 29.3 06/14/2019       HCG (If Applicable): No results found for: PREGTESTUR, PREGSERUM, HCG, HCGQUANT     ABGs: No results found for: PHART, PO2ART, AEF7DOB, KRX7PNF, BEART, O7BWJXIC     Type & Screen (If Applicable):  No results found for: [facility]    Anesthesia Evaluation  Patient summary reviewed and Nursing notes reviewed no history of anesthetic complications:   Airway: Mallampati: III     Neck ROM: full   Dental:          Pulmonary:Negative Pulmonary ROS and normal exam                               Cardiovascular:Negative CV ROS    (+) hypertension:, CAD:, hyperlipidemia    (-)  angina                Neuro/Psych:   Negative Neuro/Psych ROS  (+) psychiatric history:            GI/Hepatic/Renal: Neg GI/Hepatic/Renal ROS  (+) renal disease: CRI,      (-) hiatal hernia and GERD       Endo/Other: Negative Endo/Other ROS   (+) Diabetes, : arthritis:., .                 Abdominal:           Vascular:                                      Anesthesia Plan      general     ASA 3     (I discussed with the patient the risks and benefits of PIV, general anesthesia, IV Narcotics, PACU. All questions were answered the patient agrees with the plan.)  Induction: intravenous. Pre-Operative Diagnosis: BENIGN PROSTATIC HYPERTROPHY WITH LOWER URINARY TRACT SYMPTOMS    80 y.o.   BMI:  Body mass index is 26.29 kg/m².      Vitals:    08/13/19 1019 08/14/19 1314 08/14/19 1317   BP:  (!) 142/70 133/76   Pulse:  90    Resp:  14    Temp:  97.8 °F (36.6 °C)    TempSrc:  Temporal    SpO2:  100%    Weight: 158 lb (71.7 kg) 158 lb (71.7 kg)    Height: 5' 5\" (1.651 m) 5' 5\" (1.651 m)        No Known Allergies    Social History     Tobacco Use    Smoking status: Never Smoker    Smokeless tobacco: Never Used   Substance Use Topics    Alcohol use: No       LABS:    CBC  Lab Results   Component Value Date/Time    WBC 8.0 08/14/2019 02:06 PM    HGB 10.3 (L) 08/14/2019 02:06 PM    HCT 32.3 (L) 08/14/2019 02:06 PM     08/14/2019 02:06 PM     RENAL  Lab Results   Component Value Date/Time     07/30/2019 12:45 AM    K 4.1 07/30/2019 12:45 AM    K 4.4 07/21/2019 12:35 PM     07/30/2019 12:45 AM    CO2 25 07/30/2019 12:45 AM    BUN 25 (H) 07/30/2019 12:45 AM    CREATININE 1.0 07/30/2019 12:45 AM    GLUCOSE 263 (H) 07/30/2019 12:45 AM    GLUCOSE 82 12/01/2011 03:54 PM     COAGS  Lab Results   Component Value Date/Time    PROTIME 12.1 06/14/2019 07:27 PM    INR 1.06 06/14/2019 07:27 PM    APTT 29.3 06/14/2019 07:27 PM     Alanna Hammond MD   8/13/2019

## 2019-08-14 ENCOUNTER — HOSPITAL ENCOUNTER (OUTPATIENT)
Age: 84
Setting detail: OBSERVATION
Discharge: HOME | End: 2019-08-15
Attending: UROLOGY | Admitting: UROLOGY
Payer: MEDICARE

## 2019-08-14 ENCOUNTER — ANESTHESIA (OUTPATIENT)
Dept: OPERATING ROOM | Age: 84
End: 2019-08-14
Payer: MEDICARE

## 2019-08-14 VITALS
OXYGEN SATURATION: 100 % | DIASTOLIC BLOOD PRESSURE: 71 MMHG | RESPIRATION RATE: 20 BRPM | SYSTOLIC BLOOD PRESSURE: 116 MMHG

## 2019-08-14 DIAGNOSIS — N40.1 BENIGN PROSTATIC HYPERPLASIA WITH URINARY OBSTRUCTION: Primary | ICD-10-CM

## 2019-08-14 DIAGNOSIS — N13.8 BENIGN PROSTATIC HYPERPLASIA WITH URINARY OBSTRUCTION: Primary | ICD-10-CM

## 2019-08-14 LAB
BASOPHILS ABSOLUTE: 0.1 K/UL (ref 0–0.2)
BASOPHILS RELATIVE PERCENT: 0.7 %
EOSINOPHILS ABSOLUTE: 0.2 K/UL (ref 0–0.6)
EOSINOPHILS RELATIVE PERCENT: 3 %
GLUCOSE BLD-MCNC: 138 MG/DL (ref 70–99)
GLUCOSE BLD-MCNC: 144 MG/DL (ref 70–99)
HCT VFR BLD CALC: 32.3 % (ref 40.5–52.5)
HEMOGLOBIN: 10.3 G/DL (ref 13.5–17.5)
LYMPHOCYTES ABSOLUTE: 1.1 K/UL (ref 1–5.1)
LYMPHOCYTES RELATIVE PERCENT: 13.2 %
MCH RBC QN AUTO: 27.2 PG (ref 26–34)
MCHC RBC AUTO-ENTMCNC: 31.8 G/DL (ref 31–36)
MCV RBC AUTO: 85.6 FL (ref 80–100)
MONOCYTES ABSOLUTE: 0.9 K/UL (ref 0–1.3)
MONOCYTES RELATIVE PERCENT: 11.5 %
NEUTROPHILS ABSOLUTE: 5.7 K/UL (ref 1.7–7.7)
NEUTROPHILS RELATIVE PERCENT: 71.6 %
PDW BLD-RTO: 15.7 % (ref 12.4–15.4)
PERFORMED ON: ABNORMAL
PERFORMED ON: ABNORMAL
PLATELET # BLD: 267 K/UL (ref 135–450)
PMV BLD AUTO: 7 FL (ref 5–10.5)
RBC # BLD: 3.78 M/UL (ref 4.2–5.9)
WBC # BLD: 8 K/UL (ref 4–11)

## 2019-08-14 PROCEDURE — 85025 COMPLETE CBC W/AUTO DIFF WBC: CPT

## 2019-08-14 PROCEDURE — 94761 N-INVAS EAR/PLS OXIMETRY MLT: CPT

## 2019-08-14 PROCEDURE — 1200000000 HC SEMI PRIVATE

## 2019-08-14 PROCEDURE — 2580000003 HC RX 258: Performed by: UROLOGY

## 2019-08-14 PROCEDURE — 2709999900 HC NON-CHARGEABLE SUPPLY: Performed by: UROLOGY

## 2019-08-14 PROCEDURE — 7100000001 HC PACU RECOVERY - ADDTL 15 MIN: Performed by: UROLOGY

## 2019-08-14 PROCEDURE — 88305 TISSUE EXAM BY PATHOLOGIST: CPT

## 2019-08-14 PROCEDURE — 7100000000 HC PACU RECOVERY - FIRST 15 MIN: Performed by: UROLOGY

## 2019-08-14 PROCEDURE — 3600000004 HC SURGERY LEVEL 4 BASE: Performed by: UROLOGY

## 2019-08-14 PROCEDURE — 6370000000 HC RX 637 (ALT 250 FOR IP): Performed by: UROLOGY

## 2019-08-14 PROCEDURE — 3600000014 HC SURGERY LEVEL 4 ADDTL 15MIN: Performed by: UROLOGY

## 2019-08-14 PROCEDURE — 6360000002 HC RX W HCPCS: Performed by: UROLOGY

## 2019-08-14 PROCEDURE — 6360000002 HC RX W HCPCS: Performed by: NURSE ANESTHETIST, CERTIFIED REGISTERED

## 2019-08-14 PROCEDURE — 88300 SURGICAL PATH GROSS: CPT

## 2019-08-14 PROCEDURE — 3700000000 HC ANESTHESIA ATTENDED CARE: Performed by: UROLOGY

## 2019-08-14 PROCEDURE — 2500000003 HC RX 250 WO HCPCS: Performed by: ANESTHESIOLOGY

## 2019-08-14 PROCEDURE — G0378 HOSPITAL OBSERVATION PER HR: HCPCS

## 2019-08-14 PROCEDURE — 94150 VITAL CAPACITY TEST: CPT

## 2019-08-14 PROCEDURE — 3700000001 HC ADD 15 MINUTES (ANESTHESIA): Performed by: UROLOGY

## 2019-08-14 PROCEDURE — 36415 COLL VENOUS BLD VENIPUNCTURE: CPT

## 2019-08-14 RX ORDER — SODIUM CHLORIDE, SODIUM LACTATE, POTASSIUM CHLORIDE, CALCIUM CHLORIDE 600; 310; 30; 20 MG/100ML; MG/100ML; MG/100ML; MG/100ML
INJECTION, SOLUTION INTRAVENOUS CONTINUOUS
Status: DISCONTINUED | OUTPATIENT
Start: 2019-08-14 | End: 2019-08-14

## 2019-08-14 RX ORDER — SODIUM CHLORIDE 0.9 % (FLUSH) 0.9 %
10 SYRINGE (ML) INJECTION PRN
Status: DISCONTINUED | OUTPATIENT
Start: 2019-08-14 | End: 2019-08-14 | Stop reason: HOSPADM

## 2019-08-14 RX ORDER — MORPHINE SULFATE 10 MG/ML
2 INJECTION, SOLUTION INTRAMUSCULAR; INTRAVENOUS EVERY 5 MIN PRN
Status: DISCONTINUED | OUTPATIENT
Start: 2019-08-14 | End: 2019-08-14 | Stop reason: HOSPADM

## 2019-08-14 RX ORDER — SULFAMETHOXAZOLE AND TRIMETHOPRIM 400; 80 MG/1; MG/1
1 TABLET ORAL 2 TIMES DAILY
Qty: 8 TABLET | Refills: 0 | Status: SHIPPED | OUTPATIENT
Start: 2019-08-14 | End: 2019-08-18

## 2019-08-14 RX ORDER — ROPINIROLE 1 MG/1
1 TABLET, FILM COATED ORAL NIGHTLY
Status: DISCONTINUED | OUTPATIENT
Start: 2019-08-14 | End: 2019-08-15 | Stop reason: HOSPADM

## 2019-08-14 RX ORDER — SODIUM CHLORIDE, SODIUM LACTATE, POTASSIUM CHLORIDE, CALCIUM CHLORIDE 600; 310; 30; 20 MG/100ML; MG/100ML; MG/100ML; MG/100ML
INJECTION, SOLUTION INTRAVENOUS CONTINUOUS
Status: DISCONTINUED | OUTPATIENT
Start: 2019-08-14 | End: 2019-08-15 | Stop reason: HOSPADM

## 2019-08-14 RX ORDER — ONDANSETRON 2 MG/ML
4 INJECTION INTRAMUSCULAR; INTRAVENOUS EVERY 6 HOURS PRN
Status: DISCONTINUED | OUTPATIENT
Start: 2019-08-14 | End: 2019-08-15 | Stop reason: HOSPADM

## 2019-08-14 RX ORDER — OXYCODONE HYDROCHLORIDE 5 MG/1
5 TABLET ORAL EVERY 4 HOURS PRN
Status: DISCONTINUED | OUTPATIENT
Start: 2019-08-14 | End: 2019-08-15 | Stop reason: HOSPADM

## 2019-08-14 RX ORDER — LABETALOL HYDROCHLORIDE 5 MG/ML
5 INJECTION, SOLUTION INTRAVENOUS EVERY 10 MIN PRN
Status: DISCONTINUED | OUTPATIENT
Start: 2019-08-14 | End: 2019-08-14 | Stop reason: HOSPADM

## 2019-08-14 RX ORDER — OXYCODONE HYDROCHLORIDE AND ACETAMINOPHEN 5; 325 MG/1; MG/1
1 TABLET ORAL PRN
Status: DISCONTINUED | OUTPATIENT
Start: 2019-08-14 | End: 2019-08-14 | Stop reason: HOSPADM

## 2019-08-14 RX ORDER — TRIAMTERENE AND HYDROCHLOROTHIAZIDE 37.5; 25 MG/1; MG/1
1 TABLET ORAL DAILY
Status: DISCONTINUED | OUTPATIENT
Start: 2019-08-14 | End: 2019-08-15 | Stop reason: HOSPADM

## 2019-08-14 RX ORDER — HYDROMORPHONE HCL 110MG/55ML
0.5 PATIENT CONTROLLED ANALGESIA SYRINGE INTRAVENOUS
Status: DISCONTINUED | OUTPATIENT
Start: 2019-08-14 | End: 2019-08-15 | Stop reason: HOSPADM

## 2019-08-14 RX ORDER — TAMSULOSIN HYDROCHLORIDE 0.4 MG/1
0.4 CAPSULE ORAL DAILY
Status: DISCONTINUED | OUTPATIENT
Start: 2019-08-14 | End: 2019-08-15 | Stop reason: HOSPADM

## 2019-08-14 RX ORDER — ISOSORBIDE DINITRATE 10 MG/1
10 TABLET ORAL 2 TIMES DAILY
Status: DISCONTINUED | OUTPATIENT
Start: 2019-08-14 | End: 2019-08-15 | Stop reason: HOSPADM

## 2019-08-14 RX ORDER — PROMETHAZINE HYDROCHLORIDE 25 MG/ML
6.25 INJECTION, SOLUTION INTRAMUSCULAR; INTRAVENOUS
Status: DISCONTINUED | OUTPATIENT
Start: 2019-08-14 | End: 2019-08-14 | Stop reason: HOSPADM

## 2019-08-14 RX ORDER — ONDANSETRON 2 MG/ML
4 INJECTION INTRAMUSCULAR; INTRAVENOUS PRN
Status: DISCONTINUED | OUTPATIENT
Start: 2019-08-14 | End: 2019-08-14 | Stop reason: HOSPADM

## 2019-08-14 RX ORDER — DOCUSATE SODIUM 100 MG/1
100 CAPSULE, LIQUID FILLED ORAL 2 TIMES DAILY
Qty: 60 CAPSULE | Refills: 0 | Status: SHIPPED | OUTPATIENT
Start: 2019-08-14 | End: 2019-01-01

## 2019-08-14 RX ORDER — DOCUSATE SODIUM 100 MG/1
100 CAPSULE, LIQUID FILLED ORAL 2 TIMES DAILY
Status: DISCONTINUED | OUTPATIENT
Start: 2019-08-14 | End: 2019-08-15 | Stop reason: HOSPADM

## 2019-08-14 RX ORDER — HYDRALAZINE HYDROCHLORIDE 20 MG/ML
5 INJECTION INTRAMUSCULAR; INTRAVENOUS EVERY 10 MIN PRN
Status: DISCONTINUED | OUTPATIENT
Start: 2019-08-14 | End: 2019-08-14 | Stop reason: HOSPADM

## 2019-08-14 RX ORDER — LIDOCAINE HYDROCHLORIDE 10 MG/ML
0.3 INJECTION, SOLUTION EPIDURAL; INFILTRATION; INTRACAUDAL; PERINEURAL
Status: COMPLETED | OUTPATIENT
Start: 2019-08-14 | End: 2019-08-14

## 2019-08-14 RX ORDER — SODIUM CHLORIDE 0.9 % (FLUSH) 0.9 %
10 SYRINGE (ML) INJECTION EVERY 12 HOURS SCHEDULED
Status: DISCONTINUED | OUTPATIENT
Start: 2019-08-14 | End: 2019-08-14 | Stop reason: HOSPADM

## 2019-08-14 RX ORDER — FENTANYL CITRATE 50 UG/ML
INJECTION, SOLUTION INTRAMUSCULAR; INTRAVENOUS PRN
Status: DISCONTINUED | OUTPATIENT
Start: 2019-08-14 | End: 2019-08-14 | Stop reason: SDUPTHER

## 2019-08-14 RX ORDER — OXYCODONE HYDROCHLORIDE AND ACETAMINOPHEN 5; 325 MG/1; MG/1
0.5 TABLET ORAL EVERY 4 HOURS PRN
Qty: 10 TABLET | Refills: 0 | Status: SHIPPED | OUTPATIENT
Start: 2019-08-14 | End: 2019-08-19

## 2019-08-14 RX ORDER — OXYCODONE HYDROCHLORIDE AND ACETAMINOPHEN 5; 325 MG/1; MG/1
2 TABLET ORAL PRN
Status: DISCONTINUED | OUTPATIENT
Start: 2019-08-14 | End: 2019-08-14 | Stop reason: HOSPADM

## 2019-08-14 RX ORDER — SODIUM CHLORIDE 0.9 % (FLUSH) 0.9 %
10 SYRINGE (ML) INJECTION EVERY 12 HOURS SCHEDULED
Status: DISCONTINUED | OUTPATIENT
Start: 2019-08-14 | End: 2019-08-15 | Stop reason: HOSPADM

## 2019-08-14 RX ORDER — DIPHENHYDRAMINE HYDROCHLORIDE 50 MG/ML
12.5 INJECTION INTRAMUSCULAR; INTRAVENOUS
Status: DISCONTINUED | OUTPATIENT
Start: 2019-08-14 | End: 2019-08-14 | Stop reason: HOSPADM

## 2019-08-14 RX ORDER — SODIUM CHLORIDE 0.9 % (FLUSH) 0.9 %
10 SYRINGE (ML) INJECTION PRN
Status: DISCONTINUED | OUTPATIENT
Start: 2019-08-14 | End: 2019-08-15 | Stop reason: HOSPADM

## 2019-08-14 RX ORDER — MORPHINE SULFATE 10 MG/ML
1 INJECTION, SOLUTION INTRAMUSCULAR; INTRAVENOUS EVERY 5 MIN PRN
Status: DISCONTINUED | OUTPATIENT
Start: 2019-08-14 | End: 2019-08-14 | Stop reason: HOSPADM

## 2019-08-14 RX ADMIN — FENTANYL CITRATE 50 MCG: 50 INJECTION, SOLUTION INTRAMUSCULAR; INTRAVENOUS at 16:02

## 2019-08-14 RX ADMIN — OXYCODONE HYDROCHLORIDE 5 MG: 5 TABLET ORAL at 20:09

## 2019-08-14 RX ADMIN — ISOSORBIDE DINITRATE 10 MG: 10 TABLET ORAL at 20:09

## 2019-08-14 RX ADMIN — FENTANYL CITRATE 25 MCG: 50 INJECTION, SOLUTION INTRAMUSCULAR; INTRAVENOUS at 15:55

## 2019-08-14 RX ADMIN — CEFAZOLIN 2 G: 1 INJECTION, POWDER, FOR SOLUTION INTRAMUSCULAR; INTRAVENOUS; PARENTERAL at 22:52

## 2019-08-14 RX ADMIN — TRIAMTERENE AND HYDROCHLOROTHIAZIDE 1 TABLET: 37.5; 25 TABLET ORAL at 20:09

## 2019-08-14 RX ADMIN — SODIUM CHLORIDE, POTASSIUM CHLORIDE, SODIUM LACTATE AND CALCIUM CHLORIDE: 600; 310; 30; 20 INJECTION, SOLUTION INTRAVENOUS at 20:08

## 2019-08-14 RX ADMIN — Medication 10 ML: at 20:09

## 2019-08-14 RX ADMIN — ROPINIROLE HYDROCHLORIDE 1 MG: 1 TABLET, FILM COATED ORAL at 20:09

## 2019-08-14 RX ADMIN — GENTAMICIN SULFATE 160 MG: 40 INJECTION, SOLUTION INTRAMUSCULAR; INTRAVENOUS at 13:36

## 2019-08-14 RX ADMIN — FENTANYL CITRATE 25 MCG: 50 INJECTION, SOLUTION INTRAMUSCULAR; INTRAVENOUS at 15:59

## 2019-08-14 RX ADMIN — TAMSULOSIN HYDROCHLORIDE 0.4 MG: 0.4 CAPSULE ORAL at 20:09

## 2019-08-14 RX ADMIN — AMPICILLIN SODIUM 2 G: 2 INJECTION, POWDER, FOR SOLUTION INTRAVENOUS at 15:23

## 2019-08-14 RX ADMIN — LIDOCAINE HYDROCHLORIDE 0.3 ML: 10 INJECTION, SOLUTION EPIDURAL; INFILTRATION; INTRACAUDAL; PERINEURAL at 13:26

## 2019-08-14 RX ADMIN — FENTANYL CITRATE 25 MCG: 50 INJECTION, SOLUTION INTRAMUSCULAR; INTRAVENOUS at 16:14

## 2019-08-14 RX ADMIN — DOCUSATE SODIUM 100 MG: 100 CAPSULE, LIQUID FILLED ORAL at 20:11

## 2019-08-14 ASSESSMENT — PULMONARY FUNCTION TESTS
PIF_VALUE: 11
PIF_VALUE: 11
PIF_VALUE: 4
PIF_VALUE: 0
PIF_VALUE: 12
PIF_VALUE: 11
PIF_VALUE: 29
PIF_VALUE: 11
PIF_VALUE: 11
PIF_VALUE: 25
PIF_VALUE: 11
PIF_VALUE: 11
PIF_VALUE: 10
PIF_VALUE: 9
PIF_VALUE: 0
PIF_VALUE: 11
PIF_VALUE: 15
PIF_VALUE: 11
PIF_VALUE: 12
PIF_VALUE: 11
PIF_VALUE: 11
PIF_VALUE: 15
PIF_VALUE: 11
PIF_VALUE: 15
PIF_VALUE: 11
PIF_VALUE: 10
PIF_VALUE: 11
PIF_VALUE: 0
PIF_VALUE: 15
PIF_VALUE: 10
PIF_VALUE: 0
PIF_VALUE: 11
PIF_VALUE: 6
PIF_VALUE: 10
PIF_VALUE: 11
PIF_VALUE: 16
PIF_VALUE: 13
PIF_VALUE: 11
PIF_VALUE: 3
PIF_VALUE: 0
PIF_VALUE: 11
PIF_VALUE: 11
PIF_VALUE: 15
PIF_VALUE: 15

## 2019-08-14 ASSESSMENT — PAIN - FUNCTIONAL ASSESSMENT: PAIN_FUNCTIONAL_ASSESSMENT: 0-10

## 2019-08-14 ASSESSMENT — PAIN SCALES - GENERAL: PAINLEVEL_OUTOF10: 7

## 2019-08-14 NOTE — H&P
History:  Reviewed, non contributory      REVIEW OF SYSTEMS:    12 point ROS was already completed and this has been reviewed. The pertinent positive findings include retenttion. PHYSICAL EXAM:    VITALS:  /76   Pulse 90   Temp 97.8 °F (36.6 °C) (Temporal)   Resp 14   Ht 5' 5\" (1.651 m)   Wt 158 lb (71.7 kg)   SpO2 100%   BMI 26.29 kg/m²   H&N: Sclera normal, no masses, trachea midline, no bruit  CVS: Normal rate and rhythm, no murmurs or rubs, peripheral pulses equal, no clubbing or cyanosis. RESP: Breath sounds equal bilateral, few rhonchi. ABDO: Soft, non-tender, bowel sounds active, no organomegaly, no hernias. LYMPH:  No lymphadenopathy. Skin: Warm dry and intact. : No CVAT, normal external genitalia, no discharge. MSK: Grossly normal for patient  KERRI: Grossly normal for patient  PSY: No acute changes noted in psychosocial assessment.      DATA:    CBC:   Lab Results   Component Value Date    WBC 8.0 08/14/2019    RBC 3.78 08/14/2019    HGB 10.3 08/14/2019    HCT 32.3 08/14/2019    MCV 85.6 08/14/2019    MCH 27.2 08/14/2019    MCHC 31.8 08/14/2019    RDW 15.7 08/14/2019     08/14/2019    MPV 7.0 08/14/2019     BMP:    Lab Results   Component Value Date     07/30/2019    K 4.1 07/30/2019    K 4.4 07/21/2019     07/30/2019    CO2 25 07/30/2019    BUN 25 07/30/2019    LABALBU 2.8 07/21/2019    CREATININE 1.0 07/30/2019    CALCIUM 8.5 07/30/2019    GFRAA >60 07/30/2019    LABGLOM >60 07/30/2019    LABGLOM 43 03/07/2016    GLUCOSE 263 07/30/2019    GLUCOSE 82 12/01/2011     U/A:    Lab Results   Component Value Date    NITRITE positive 06/12/2019    COLORU BROWN 07/30/2019    PROTEINU 100 07/30/2019    PHUR >=9.0 07/30/2019    WBCUA 6-10 07/30/2019    RBCUA >100 07/30/2019    BACTERIA 4+ 07/30/2019    CLARITYU CLOUDY 07/30/2019    SPECGRAV 1.010 07/30/2019    LEUKOCYTESUR MODERATE 07/30/2019    UROBILINOGEN 1.0 07/30/2019    BILIRUBINUR Negative 07/30/2019    BILIRUBINUR

## 2019-08-14 NOTE — ANESTHESIA POSTPROCEDURE EVALUATION
Department of Anesthesiology  Postprocedure Note    Patient: Prince Mercedes  MRN: [id number]  YOB: 1924  Date of evaluation: 8/14/2019  Time:  7:41 PM     Procedure Summary     Date:  08/14/19 Room / Location:  [facility] 02 / [facility] OR    Anesthesia Start:  9775 Anesthesia Stop:  5013    Procedure:  CYSTOSCOPY TRANSURETHRAL RESECTION PROSTATE, CYSTOLITHOPAXY (N/A Bladder) Diagnosis:  (BENIGN PROSTATIC HYPERTROPHY WITH LOWER URINARY TRACT SYMPTOMS)    Surgeon:  Merrill Longoria MD Responsible Provider:  Sabina Schaefer MD    Anesthesia Type:  general ASA Status:  3          Anesthesia Type: general    Joellen Phase I: Joellen Score: 10    Joellen Phase II:      Last vitals: Reviewed and per EMR flowsheets.        Anesthesia Post Evaluation    Comments: Postoperative Anesthesia Note    Name:    Prince Mercedes  MRN:      [id number]    Patient Vitals in the past 12 hrs:  08/14/19 1839, BP:135/82, Pulse:106, Resp:(!) 32, SpO2:97 %  08/14/19 1824, BP:131/84, Pulse:104, Resp:22, SpO2:98 %  08/14/19 1809, BP:(!) 145/77, Pulse:100, Resp:25, SpO2:97 %  08/14/19 1754, BP:138/77, Pulse:100, Resp:23, SpO2:98 %  08/14/19 1745, Temp:97.8 °F (36.6 °C), Temp src:Temporal  08/14/19 1739, BP:(!) 147/98, Pulse:112, Resp:28, SpO2:97 %  08/14/19 1724, BP:(!) 150/83, Pulse:102, Resp:(!) 39, SpO2:96 %  08/14/19 1709, BP:(!) 152/82, Pulse:97, Resp:29, SpO2:96 %  08/14/19 1654, BP:(!) 170/86, Pulse:97, Resp:19, SpO2:96 %  08/14/19 1649, BP:(!) 148/98, Pulse:95, Resp:19  08/14/19 1644, BP:(!) 150/88, Pulse:93, Resp:19, SpO2:98 %  08/14/19 1639, BP:(!) 150/88, Temp:97.6 °F (36.4 °C), Temp src:Temporal, Pulse:95, Resp:16  08/14/19 1317, BP:133/76  08/14/19 1314, BP:(!) 142/70, Temp:97.8 °F (36.6 °C), Temp src:Temporal, Pulse:90, Resp:14, SpO2:100 %, Height:5' 5\" (1.651 m), Weight:158 lb (71.7 kg)     LABS:    CBC  Lab Results       Component                Value               Date/Time                  WBC                      8.0 08/14/2019 02:06 PM        HGB                      10.3 (L)            08/14/2019 02:06 PM        HCT                      32.3 (L)            08/14/2019 02:06 PM        PLT                      267                 08/14/2019 02:06 PM   RENAL  Lab Results       Component                Value               Date/Time                  NA                       138                 07/30/2019 12:45 AM        K                        4.1                 07/30/2019 12:45 AM        K                        4.4                 07/21/2019 12:35 PM        CL                       105                 07/30/2019 12:45 AM        CO2                      25                  07/30/2019 12:45 AM        BUN                      25 (H)              07/30/2019 12:45 AM        CREATININE               1.0                 07/30/2019 12:45 AM        GLUCOSE                  263 (H)             07/30/2019 12:45 AM        GLUCOSE                  82                  12/01/2011 03:54 PM   COAGS  Lab Results       Component                Value               Date/Time                  PROTIME                  12.1                06/14/2019 07:27 PM        INR                      1.06                06/14/2019 07:27 PM        APTT                     29.3                06/14/2019 07:27 PM     [place]  [facility] [facility]    Nausea & Vomiting:  No    Level of Consciousness:  Awake    Pain Assessment:  Adequate analgesia    Anesthesia Complications:  No apparent anesthetic complications    SUMMARY      Vital signs stable  OK to discharge from Stage I post anesthesia care.   Care transferred from Anesthesiology department on discharge from perioperative area

## 2019-08-15 VITALS
OXYGEN SATURATION: 98 % | SYSTOLIC BLOOD PRESSURE: 130 MMHG | WEIGHT: 158 LBS | DIASTOLIC BLOOD PRESSURE: 68 MMHG | RESPIRATION RATE: 20 BRPM | TEMPERATURE: 97.3 F | BODY MASS INDEX: 26.33 KG/M2 | HEART RATE: 113 BPM | HEIGHT: 65 IN

## 2019-08-15 LAB
GLUCOSE BLD-MCNC: 146 MG/DL (ref 70–99)
GLUCOSE BLD-MCNC: 201 MG/DL (ref 70–99)
GLUCOSE BLD-MCNC: 222 MG/DL (ref 70–99)
PERFORMED ON: ABNORMAL

## 2019-08-15 PROCEDURE — G0378 HOSPITAL OBSERVATION PER HR: HCPCS

## 2019-08-15 PROCEDURE — 6360000002 HC RX W HCPCS: Performed by: UROLOGY

## 2019-08-15 PROCEDURE — 6370000000 HC RX 637 (ALT 250 FOR IP): Performed by: UROLOGY

## 2019-08-15 PROCEDURE — 2580000003 HC RX 258: Performed by: UROLOGY

## 2019-08-15 RX ADMIN — SODIUM CHLORIDE, POTASSIUM CHLORIDE, SODIUM LACTATE AND CALCIUM CHLORIDE: 600; 310; 30; 20 INJECTION, SOLUTION INTRAVENOUS at 09:22

## 2019-08-15 RX ADMIN — OXYCODONE HYDROCHLORIDE 5 MG: 5 TABLET ORAL at 04:32

## 2019-08-15 RX ADMIN — OXYCODONE HYDROCHLORIDE 5 MG: 5 TABLET ORAL at 09:23

## 2019-08-15 RX ADMIN — TAMSULOSIN HYDROCHLORIDE 0.4 MG: 0.4 CAPSULE ORAL at 09:23

## 2019-08-15 RX ADMIN — CEFAZOLIN 2 G: 1 INJECTION, POWDER, FOR SOLUTION INTRAMUSCULAR; INTRAVENOUS; PARENTERAL at 04:32

## 2019-08-15 RX ADMIN — ISOSORBIDE DINITRATE 10 MG: 10 TABLET ORAL at 09:23

## 2019-08-15 RX ADMIN — DOCUSATE SODIUM 100 MG: 100 CAPSULE, LIQUID FILLED ORAL at 09:23

## 2019-08-15 RX ADMIN — TRIAMTERENE AND HYDROCHLOROTHIAZIDE 1 TABLET: 37.5; 25 TABLET ORAL at 09:23

## 2019-08-15 ASSESSMENT — PAIN - FUNCTIONAL ASSESSMENT: PAIN_FUNCTIONAL_ASSESSMENT: PREVENTS OR INTERFERES SOME ACTIVE ACTIVITIES AND ADLS

## 2019-08-15 ASSESSMENT — PAIN DESCRIPTION - LOCATION: LOCATION: KNEE

## 2019-08-15 ASSESSMENT — PAIN DESCRIPTION - DESCRIPTORS: DESCRIPTORS: OTHER (COMMENT)

## 2019-08-15 ASSESSMENT — PAIN SCALES - GENERAL
PAINLEVEL_OUTOF10: 10
PAINLEVEL_OUTOF10: 10
PAINLEVEL_OUTOF10: 7

## 2019-08-15 ASSESSMENT — PAIN DESCRIPTION - ORIENTATION: ORIENTATION: RIGHT;LEFT

## 2019-08-15 ASSESSMENT — PAIN DESCRIPTION - PAIN TYPE: TYPE: CHRONIC PAIN

## 2019-08-15 NOTE — CARE COORDINATION
Pt gives permission to speak with DIL via TC for initial interview as pt is very hard of hearing. Role of dcp explained. Pt from home with family and plan return. Chrissy declines [facility] at this time. Discharge timeout done at the bedside with AJ Aguilar. All discharge needs and concerns addressed. Explained Case Management role/services.

## 2019-08-15 NOTE — PROGRESS NOTES
Prescriptions and discharge instructions given, pt and family verbalized understanding, denies questions/ needs at this time. Request sent to transport for wheelchair escort to vehicle for discharge home. Benitez Soto RN  \

## 2019-08-15 NOTE — DISCHARGE SUMMARY
Urology Discharge Summary      Patient Identification  Steve Montanez is a 80 y.o. male. :  1924  Admit Date:  2019    Discharge date:   8/15/19                                  Disposition: home    Discharge Diagnoses: BPH   Patient Active Problem List   Diagnosis    Hypertension    Benign prostatic hyperplasia    CAD (coronary artery disease)    Type 2 diabetes mellitus without complication ([facility]    Osteoarthrosis    Restless legs syndrome    Renal failure    Neoplasm of uncertain behavior of skin    Hyperlipidemia associated with type 2 diabetes mellitus (HCC)    Chronic kidney disease (CKD), stage III (moderate) (HCC)    Primary osteoarthritis of both knees    Acute encephalopathy    Metabolic encephalopathy    Hypoglycemia    Elevated LFTs    Confusion    BPH with urinary obstruction       Surgery: CYSTOSCOPY TRANSURETHRAL RESECTION PROSTATE, CYSTOLITHOPAXY    Activity:  no lifting, Driving, or Strenuous exercise until cleared by Dr. Lito Christianson on discharge:   stable    Follow-up:   4-5 weeks with Dr. Wili Browne course:   Patient is a 80year old male who presented to Dr. Geraldo Perez with BPH. He elected to undergo the above procedure on 19. Surgery went without complications. Postoperative course was routine.   He was discharged home the following day pending voiding trial.    Blaise Reinoso PA-C

## 2019-08-16 NOTE — OP NOTE
position. Prepping and draping was done in a sterile fashion. His Sharif catheter  had been removed. Pneumatic boots were also applied and all pressure  points were well-padded. Cystourethroscopy was then performed with both  30 and 70-degree lenses. Significant BPH was noted as well as gross  trabeculation and diverticular disease of the bladder. The ureteral  orifices were able to be identified and also large bladder stone was  seen. The cystoscope was then removed and resectoscope was placed. Resection of the prostate was done first by finding the bladder neck  region followed by a circumferential resection of the prostate back to  the verumontanum. The prostatic chips were irrigated from the patient's  bladder was sent for pathologic analysis. Along with this, a  cystolitholapaxy was performed and a large stone was able to be grasped  and removed and also sent for pathologic analysis. Any bleeding vessels  identified during the resection were then treated with monopolar  cauterization. Care was taken to irrigate the patient's bladder, remove  all prostatic chips, especially from the diverticulum. At the end of  the resection, a wide-open prostatic fossa was noted. Care had been  taken not to injure the ureteral orifices or the urothelium distal to  the verumontanum. No other stones or tumors were noted. A 22-Italian  three-way Sharif catheter was then placed with normal saline three-way  irrigation, and he was then taken back to the postop recovery room in  stable condition.         Sueellen Lanes, MD    D: 08/15/2019 18:33:32       T: 08/15/2019 18:41:35     [email or web address]  Job#: [id number]     Doc#: [id number]    CC:

## 2019-09-08 NOTE — ED PROVIDER NOTES
[address] [facility]) MISC, Use for ambulation, Disp: 1 each, Rfl: 0    aspirin 81 MG tablet, Take 1 tablet by mouth daily. , Disp: 30 tablet, Rfl:     No Known Allergies    Social History     Socioeconomic History    Marital status:      Spouse name: Not on file    Number of children: Not on file    Years of education: Not on file    Highest education level: Not on file   Occupational History    Not on file   Social Needs    Financial resource strain: Not on file    Food insecurity:     Worry: Not on file     Inability: Not on file    Transportation needs:     Medical: Not on file     Non-medical: Not on file   Tobacco Use    Smoking status: Never Smoker    Smokeless tobacco: Never Used   Substance and Sexual Activity    Alcohol use: No    Drug use: No    Sexual activity: Not Currently     Partners: Female   Lifestyle    Physical activity:     Days per week: Not on file     Minutes per session: Not on file    Stress: Not on file   Relationships    Social connections:     Talks on phone: Not on file     Gets together: Not on file     Attends Yarsanism service: Not on file     Active member of club or organization: Not on file     Attends meetings of clubs or organizations: Not on file     Relationship status: Not on file    Intimate partner violence:     Fear of current or ex partner: Not on file     Emotionally abused: Not on file     Physically abused: Not on file     Forced sexual activity: Not on file   Other Topics Concern    Not on file   Social History Narrative    Not on file       Nursing Notes Reviewed      ROS:  GENERAL:  No fever, no chills, no diaphoresis, no appetite changes  EYES: no eye discharge, no eye redness, no visual changes  ENT: no nasal congestion, no sore throat  CARDIAC: no chest pain, no palpitations, no leg swelling  PULM: no cough, no shortness of breath  ABD: no abdominal pain, no nausea, no vomiting, no diarrhea, no melena or hematochezia  : no dysuria, + hematuria, no urgency, no frequency. No flank pain  MUSCULOSKELETAL: no back pain, no arthralgias, no myalgias  NEURO: no headache, no lightheadedness, no dizziness, no numbness, no weakness, no syncope, no confusion, no speech difficulty  SKIN: no rashes, no erythema, no wounds, no ecchymosis      PHYSICAL EXAM:  GENERAL APPEARANCE: Dewight Fillers Seip is in no acute respiratory distress. Awake and alert. VITAL SIGNS:   ED Triage Vitals [09/07/19 2210]   Enc Vitals Group      /75      Pulse 95      Resp 18      Temp 98.5 °F (36.9 °C)      Temp Source Oral      SpO2 100 %      Weight 158 lb (71.7 kg)      Height 5' 5\" (1.651 m)      Head Circumference       Peak Flow       Pain Score       Pain Loc       Pain Edu? Excl. in [address]? HEAD: Normocephalic, atraumatic. EYES:  Extraocular muscles are intact. Pupils equal round and reactive to light. Conjunctivas are pink. Negative scleral icterus. ENT:  Mucous membranes are moist.  Pharynx without erythema or exudates. NECK: Nontender and supple. No cervical adenopathy. CHEST:  Clear to auscultation bilaterally. No rales, rhonchi, or wheezing. HEART:  Regular rate and regular rhythm. No murmurs. Strong and equal pulses in the upper and lower extremities. ABDOMEN: Soft,  nondistended, positive bowel sounds. abdomen is nontender. No rebound. no guarding. No flank tenderness  MUSCULOSKELETAL: The calves are nontender to palpation. Active range of motion of the upper and lower extremities. No edema. NEUROLOGICAL: Awake, alert and oriented x 3. Power intact in the upper and lower extremities. DERMATOLOGIC: No petechiae, rashes, or ecchymoses. No erythema. PSYCH: normal mood and affect. Normal thought content. ED COURSE AND MEDICAL DECISION MAKING:    Radiology:  Films have been read by radiologist as noted in chart unless otherwise stated.  Other radiologic studies (i.e. CT, MRI, ultrasounds, etc ) have been interpreted by radiologist.     No orders to display       Labs:  Results for orders placed or performed during the hospital encounter of 09/07/19   Urinalysis   Result Value Ref Range    Color, UA RED (A) Straw/Yellow    Clarity, UA CLOUDY (A) Clear    Glucose, Ur Color Interfer (A) Negative mg/dL    Bilirubin Urine Color Interfer (A) Negative    Ketones, Urine Color Interfer (A) Negative mg/dL    Specific Gravity, UA 1.020 1.005 - 1.030    Blood, Urine LARGE (A) Negative    pH, UA Color Interfer (A) 5.0 - 8.0    Protein, UA Color Interfer (A) Negative mg/dL    Urobilinogen, Urine Color Interfer (A) <2.0 E.U./dL    Nitrite, Urine Color Interfer (A) Negative    Leukocyte Esterase, Urine Color Interfer (A) Negative    Microscopic Examination YES     Urine Type Not Specified    Microscopic Urinalysis   Result Value Ref Range    WBC, UA 3-5 0 - 5 /HPF    RBC, UA >100 (A) 0 - 2 /HPF   CBC Auto Differential   Result Value Ref Range    WBC 8.5 4.0 - 11.0 K/uL    RBC 3.91 (L) 4.20 - 5.90 M/uL    Hemoglobin 9.4 (L) 13.5 - 17.5 g/dL    Hematocrit 30.4 (L) 40.5 - 52.5 %    MCV 77.7 (L) 80.0 - 100.0 fL    MCH 24.0 (L) 26.0 - 34.0 pg    MCHC 31.0 31.0 - 36.0 g/dL    RDW 16.1 (H) 12.4 - 15.4 %    Platelets 240 612 - 330 K/uL    MPV 7.4 5.0 - 10.5 fL    Neutrophils % 66.1 %    Lymphocytes % 18.2 %    Monocytes % 13.2 %    Eosinophils % 1.7 %    Basophils % 0.8 %    Neutrophils Absolute 5.7 1.7 - 7.7 K/uL    Lymphocytes Absolute 1.6 1.0 - 5.1 K/uL    Monocytes Absolute 1.1 0.0 - 1.3 K/uL    Eosinophils Absolute 0.1 0.0 - 0.6 K/uL    Basophils Absolute 0.1 0.0 - 0.2 K/uL   Basic Metabolic Panel   Result Value Ref Range    Sodium 136 136 - 145 mmol/L    Potassium 4.1 3.5 - 5.1 mmol/L    Chloride 100 99 - 110 mmol/L    CO2 24 21 - 32 mmol/L    Anion Gap 12 3 - 16    Glucose 255 (H) 70 - 99 mg/dL    BUN 20 7 - 20 mg/dL    CREATININE 1.0 0.8 - 1.3 mg/dL    GFR Non-African American >60 >60    GFR African American >60 >60    Calcium 9.0 8.3 - 10.6 mg/dL   Protime-INR   Result

## 2019-09-08 NOTE — ED NOTES
Patient used urinal at bedside, standing up. Urine remains red in color with clots.       Louis Griffiths  09/08/19 5279

## 2019-09-12 NOTE — PATIENT INSTRUCTIONS
Try mucinex DM over the counter for symptoms   Patient Education        azithromycin  Pronunciation:  joon ZITH kyaw MYE sin  Brand:  Azithromycin 3 Day Dose Pack, Azithromycin 5 Day Dose Pack, Zithromax, Zithromax TRI-SHAI, Zithromax Z-Shai, Zmax  What is the most important information I should know about azithromycin? You should not use this medication if you have ever had jaundice or liver problems caused by taking azithromycin. What is azithromycin? Azithromycin is an antibiotic that fights bacteria. Azithromycin is used to treat many different types of infections caused by bacteria, such as respiratory infections, skin infections, ear infections, and sexually transmitted diseases. Azithromycin may also be used for purposes not listed in this medication guide. What should I discuss with my healthcare provider before taking azithromycin? You should not use azithromycin if you are allergic to it, or if:  · you have ever had jaundice or liver problems caused by taking azithromycin; or  · you are allergic to similar drugs such as clarithromycin, erythromycin, or telithromycin. To make sure azithromycin is safe for you, tell your doctor if you have ever had:  · liver disease;  · kidney disease;  · myasthenia gravis;  · a heart rhythm disorder;  · low levels of potassium in your blood; or  · long QT syndrome (in you or a family member). This medicine is not expected to harm an unborn baby. Tell your doctor if you are pregnant or plan to become pregnant. It is not known whether azithromycin passes into breast milk or if it could harm a nursing baby. Tell your doctor if you are breast-feeding a baby. Do not give this medicine to a child younger than 7 months old. How should I take azithromycin? Follow all directions on your prescription label. Do not take this medicine in larger or smaller amounts or for longer than recommended.  The dose and length of treatment with azithromycin may not be the same for every type about side effects. You may report side effects to FDA at 8-964-FDA-4294. What other drugs will affect azithromycin? Tell your doctor about all your current medicines and any you start or stop using, especially:  · nelfinavir; or  · a blood thinner --warfarin, Coumadin, Jantoven. This list is not complete. Other drugs may interact with azithromycin, including prescription and over-the-counter medicines, vitamins, and herbal products. Not all possible interactions are listed in this medication guide. Where can I get more information? Your pharmacist can provide more information about azithromycin. Remember, keep this and all other medicines out of the reach of children, never share your medicines with others, and use this medication only for the indication prescribed. Every effort has been made to ensure that the information provided by [address] is accurate, up-to-date, and complete, but no guarantee is made to that effect. Drug information contained herein may be time sensitive. [facility] information has been compiled for use by healthcare practitioners and consumers in the [place] and therefore [employer] does not warrant that uses outside of the [place] are appropriate, unless specifically indicated otherwise. [facility]'s drug information does not endorse drugs, diagnose patients or recommend therapy. [facility] drug information is an informational resource designed to assist licensed healthcare practitioners in caring for their patients and/or to serve consumers viewing this service as a supplement to, and not a substitute for, the expertise, skill, knowledge and judgment of healthcare practitioners. The absence of a warning for a given drug or drug combination in no way should be construed to indicate that the drug or drug combination is safe, effective or appropriate for any given patient.  [employer] does not assume any responsibility for any aspect of healthcare administered with the

## 2019-09-18 NOTE — PATIENT INSTRUCTIONS
nutritional needs are being met? Can herbs and supplements still offer a benefit? Researchers have investigated a range of natural remedies, such as ginkgo , ginseng , and the supplement phosphatidylserine (PS). So far, though, the evidence is inconsistent as to whether these products can improve memory or thinking. If you are interested in taking herbs and supplements, talk to your doctor first because they may interact with other medicines that you are taking. Exercise Regularly    Among the many benefits of regular exercise are increased blood flow to the brain and decreased risk of certain diseases that can interfere with memory function. One study found that even moderate exercise has a beneficial effect. Examples of \"moderate\" exercise include:   Playing 18 holes of golf once a week, without a cart   Playing tennis twice a week   Walking one mile per day   Manage Stress    It can be tough to remember what is important when your mind is cluttered. Make time for relaxation. Choose activities that calm you down, and make it routine. Manage Chronic Conditions    Side effects of high blood pressure , diabetes, and heart disease can interfere with mental function. Many of the lifestyle steps discussed here can help manage these conditions. Strive to eat a healthy diet, exercise regularly, get stress under control, and follow your doctor's advice for your condition. Minimize Medications    Talk to your doctor about the medicines that you take. Some may be unnecessary. Also, healthy lifestyle habits may lower the need for certain drugs. Last Reviewed: April 2010 Page Perez MD   Updated: 4/13/2010   ·     [address]       As we get older, changes in balance, gait, strength, vision, hearing, and cognition make even the most youthful senior more prone to accidents. Falls are one of the leading health risks for older people.  This increased risk of falling is related to:   Aging process (eg,

## 2019-09-23 NOTE — ED PROVIDER NOTES
(HH) 70 - 99 mg/dL    BUN 25 (H) 7 - 20 mg/dL    CREATININE 1.3 0.8 - 1.3 mg/dL    GFR Non- 51 (A) >60    GFR African American >60 >60    Calcium 8.9 8.3 - 10.6 mg/dL    Total Protein 7.0 6.4 - 8.2 g/dL    Alb 3.4 3.4 - 5.0 g/dL    Albumin/Globulin Ratio 0.9 (L) 1.1 - 2.2    Total Bilirubin 0.5 0.0 - 1.0 mg/dL    Alkaline Phosphatase 118 40 - 129 U/L    ALT 6 (L) 10 - 40 U/L    AST 13 (L) 15 - 37 U/L    Globulin 3.6 g/dL   Basic metabolic panel   Result Value Ref Range    Sodium 139 136 - 145 mmol/L    Potassium 3.4 (L) 3.5 - 5.1 mmol/L    Chloride 100 99 - 110 mmol/L    CO2 22 21 - 32 mmol/L    Anion Gap 17 (H) 3 - 16    Glucose 233 (H) 70 - 99 mg/dL    BUN 24 (H) 7 - 20 mg/dL    CREATININE 1.2 0.8 - 1.3 mg/dL    GFR Non- 56 (A) >60    GFR African American >60 >60    Calcium 9.0 8.3 - 10.6 mg/dL   EKG 12 Lead   Result Value Ref Range    Ventricular Rate 107 BPM    Atrial Rate 107 BPM    P-R Interval 230 ms    QRS Duration 68 ms    Q-T Interval 350 ms    QTc Calculation (Bazett) 467 ms    P Axis 75 degrees    R Axis 45 degrees    T Axis -62 degrees    Diagnosis       Sinus tachycardia with 1st degree A-V block with Premature ventricular complexesST & T wave abnormality, consider inferior ischemiaAbnormal ECGWhen compared with ECG of 14-JUN-2019 19:32,[place] interval has increasedST now depressed in Inferior leadsT wave inversion more evident in Inferior leadsNonspecific T wave abnormality no longer evident in Anterior leadsConfirmed by Abbey Devi MD, [facility] ([id number]) on 9/22/2019 9:34:12 PM        Radiographs (if obtained):  ? Radiologist's Report Reviewed:  XR CHEST PORTABLE   Final Result   No acute cardiopulmonary disease. ? Discussed with Radiologist:     ?  The following radiograph was interpreted by myself in the absence of a radiologist:     EKG (if obtained): (All EKG's are interpreted by myself in the absence of a cardiologist)  EKG demonstrates a sinus tachycardia at 107 with

## 2019-09-27 NOTE — PROGRESS NOTES
Outpatient Spiritual Care attempted to reach patient regarding completing advance directives. No answer. I left information about upcoming opportunity October 16th at [facility], 11:00-12:00. Offered contact number if they would like to complete at another time.

## 2019-12-18 PROBLEM — C61 ADENOCARCINOMA OF PROSTATE (HCC): Status: ACTIVE | Noted: 2019-01-01

## 2020-01-01 ENCOUNTER — APPOINTMENT (OUTPATIENT)
Dept: CT IMAGING | Age: 85
DRG: 308 | End: 2020-01-01
Payer: MEDICARE

## 2020-01-01 ENCOUNTER — CARE COORDINATION (OUTPATIENT)
Dept: CARE COORDINATION | Age: 85
End: 2020-01-01

## 2020-01-01 ENCOUNTER — HOSPITAL ENCOUNTER (INPATIENT)
Age: 85
LOS: 2 days | Discharge: HOME | DRG: 637 | End: 2020-08-20
Attending: EMERGENCY MEDICINE | Admitting: INTERNAL MEDICINE
Payer: MEDICARE

## 2020-01-01 ENCOUNTER — APPOINTMENT (OUTPATIENT)
Dept: CT IMAGING | Age: 85
DRG: 389 | End: 2020-01-01
Payer: MEDICARE

## 2020-01-01 ENCOUNTER — OFFICE VISIT (OUTPATIENT)
Dept: INTERNAL MEDICINE CLINIC | Age: 85
End: 2020-01-01

## 2020-01-01 ENCOUNTER — ANESTHESIA (OUTPATIENT)
Dept: ENDOSCOPY | Age: 85
DRG: 308 | End: 2020-01-01
Payer: MEDICARE

## 2020-01-01 ENCOUNTER — CARE COORDINATION (OUTPATIENT)
Dept: CASE MANAGEMENT | Age: 85
End: 2020-01-01

## 2020-01-01 ENCOUNTER — ANESTHESIA EVENT (OUTPATIENT)
Dept: ENDOSCOPY | Age: 85
DRG: 308 | End: 2020-01-01
Payer: MEDICARE

## 2020-01-01 ENCOUNTER — APPOINTMENT (OUTPATIENT)
Dept: GENERAL RADIOLOGY | Age: 85
End: 2020-01-01
Payer: MEDICARE

## 2020-01-01 ENCOUNTER — TELEPHONE (OUTPATIENT)
Dept: INTERNAL MEDICINE CLINIC | Age: 85
End: 2020-01-01

## 2020-01-01 ENCOUNTER — INITIAL CONSULT (OUTPATIENT)
Dept: SURGERY | Age: 85
End: 2020-01-01
Payer: MEDICARE

## 2020-01-01 ENCOUNTER — APPOINTMENT (OUTPATIENT)
Dept: GENERAL RADIOLOGY | Age: 85
DRG: 308 | End: 2020-01-01
Payer: MEDICARE

## 2020-01-01 ENCOUNTER — HOSPITAL ENCOUNTER (EMERGENCY)
Age: 85
Discharge: HOME | End: 2020-07-31
Attending: EMERGENCY MEDICINE
Payer: MEDICARE

## 2020-01-01 ENCOUNTER — TELEPHONE (OUTPATIENT)
Dept: OTHER | Facility: CLINIC | Age: 85
End: 2020-01-01

## 2020-01-01 ENCOUNTER — HOSPITAL ENCOUNTER (INPATIENT)
Age: 85
LOS: 4 days | Discharge: NURSING, REHAB OR LONG-TERM CARE | DRG: 330 | End: 2020-08-25
Attending: SURGERY | Admitting: SURGERY
Payer: MEDICARE

## 2020-01-01 ENCOUNTER — TELEPHONE (OUTPATIENT)
Dept: SURGERY | Age: 85
End: 2020-01-01

## 2020-01-01 ENCOUNTER — ANESTHESIA EVENT (OUTPATIENT)
Dept: OPERATING ROOM | Age: 85
DRG: 330 | End: 2020-01-01
Payer: MEDICARE

## 2020-01-01 ENCOUNTER — APPOINTMENT (OUTPATIENT)
Dept: GENERAL RADIOLOGY | Age: 85
DRG: 389 | End: 2020-01-01
Payer: MEDICARE

## 2020-01-01 ENCOUNTER — HOSPITAL ENCOUNTER (OUTPATIENT)
Dept: WOUND CARE | Age: 85
Discharge: HOME | DRG: 389 | End: 2020-01-16
Payer: MEDICARE

## 2020-01-01 ENCOUNTER — APPOINTMENT (OUTPATIENT)
Dept: GENERAL RADIOLOGY | Age: 85
DRG: 637 | End: 2020-01-01
Payer: MEDICARE

## 2020-01-01 ENCOUNTER — HOSPITAL ENCOUNTER (OUTPATIENT)
Dept: WOUND CARE | Age: 85
Discharge: HOME | End: 2020-01-30
Payer: MEDICARE

## 2020-01-01 ENCOUNTER — HOSPITAL ENCOUNTER (INPATIENT)
Age: 85
LOS: 3 days | Discharge: HOME | DRG: 389 | End: 2020-01-21
Attending: EMERGENCY MEDICINE | Admitting: HOSPITALIST
Payer: MEDICARE

## 2020-01-01 ENCOUNTER — HOSPITAL ENCOUNTER (INPATIENT)
Age: 85
LOS: 4 days | Discharge: HOME | DRG: 308 | End: 2020-07-29
Attending: EMERGENCY MEDICINE | Admitting: INTERNAL MEDICINE
Payer: MEDICARE

## 2020-01-01 ENCOUNTER — HOSPITAL ENCOUNTER (EMERGENCY)
Age: 85
End: 2020-08-27
Attending: EMERGENCY MEDICINE
Payer: MEDICARE

## 2020-01-01 ENCOUNTER — HOSPITAL ENCOUNTER (OUTPATIENT)
Age: 85
Discharge: HOME | End: 2020-08-17
Payer: MEDICARE

## 2020-01-01 ENCOUNTER — ANESTHESIA (OUTPATIENT)
Dept: OPERATING ROOM | Age: 85
DRG: 330 | End: 2020-01-01
Payer: MEDICARE

## 2020-01-01 VITALS
SYSTOLIC BLOOD PRESSURE: 135 MMHG | OXYGEN SATURATION: 96 % | RESPIRATION RATE: 13 BRPM | DIASTOLIC BLOOD PRESSURE: 89 MMHG

## 2020-01-01 VITALS
RESPIRATION RATE: 24 BRPM | DIASTOLIC BLOOD PRESSURE: 79 MMHG | OXYGEN SATURATION: 98 % | BODY MASS INDEX: 24.11 KG/M2 | HEART RATE: 93 BPM | TEMPERATURE: 95.9 F | SYSTOLIC BLOOD PRESSURE: 118 MMHG | HEIGHT: 66 IN | WEIGHT: 150 LBS

## 2020-01-01 VITALS
HEART RATE: 34 BPM | SYSTOLIC BLOOD PRESSURE: 35 MMHG | RESPIRATION RATE: 16 BRPM | OXYGEN SATURATION: 78 % | TEMPERATURE: 96.4 F | DIASTOLIC BLOOD PRESSURE: 16 MMHG

## 2020-01-01 VITALS
SYSTOLIC BLOOD PRESSURE: 110 MMHG | HEIGHT: 65 IN | HEART RATE: 80 BPM | BODY MASS INDEX: 25.66 KG/M2 | DIASTOLIC BLOOD PRESSURE: 78 MMHG | WEIGHT: 154 LBS

## 2020-01-01 VITALS
HEIGHT: 65 IN | DIASTOLIC BLOOD PRESSURE: 72 MMHG | BODY MASS INDEX: 25.33 KG/M2 | TEMPERATURE: 98.4 F | RESPIRATION RATE: 16 BRPM | HEART RATE: 81 BPM | WEIGHT: 152 LBS | OXYGEN SATURATION: 99 % | SYSTOLIC BLOOD PRESSURE: 135 MMHG

## 2020-01-01 VITALS
SYSTOLIC BLOOD PRESSURE: 119 MMHG | TEMPERATURE: 97 F | DIASTOLIC BLOOD PRESSURE: 67 MMHG | BODY MASS INDEX: 25.66 KG/M2 | WEIGHT: 154 LBS | HEIGHT: 65 IN

## 2020-01-01 VITALS
BODY MASS INDEX: 27.66 KG/M2 | OXYGEN SATURATION: 99 % | TEMPERATURE: 98.3 F | DIASTOLIC BLOOD PRESSURE: 77 MMHG | WEIGHT: 166 LBS | HEART RATE: 92 BPM | RESPIRATION RATE: 30 BRPM | HEIGHT: 65 IN | SYSTOLIC BLOOD PRESSURE: 120 MMHG

## 2020-01-01 VITALS — DIASTOLIC BLOOD PRESSURE: 55 MMHG | SYSTOLIC BLOOD PRESSURE: 97 MMHG | OXYGEN SATURATION: 99 %

## 2020-01-01 VITALS
SYSTOLIC BLOOD PRESSURE: 123 MMHG | OXYGEN SATURATION: 94 % | HEIGHT: 66 IN | TEMPERATURE: 98.3 F | BODY MASS INDEX: 23.95 KG/M2 | WEIGHT: 149 LBS | RESPIRATION RATE: 18 BRPM | HEART RATE: 90 BPM | DIASTOLIC BLOOD PRESSURE: 65 MMHG

## 2020-01-01 VITALS
SYSTOLIC BLOOD PRESSURE: 119 MMHG | HEIGHT: 65 IN | WEIGHT: 166 LBS | HEART RATE: 100 BPM | OXYGEN SATURATION: 93 % | RESPIRATION RATE: 16 BRPM | DIASTOLIC BLOOD PRESSURE: 81 MMHG | BODY MASS INDEX: 27.66 KG/M2 | TEMPERATURE: 97 F

## 2020-01-01 VITALS
TEMPERATURE: 97.8 F | HEIGHT: 65 IN | BODY MASS INDEX: 24.19 KG/M2 | RESPIRATION RATE: 16 BRPM | SYSTOLIC BLOOD PRESSURE: 113 MMHG | DIASTOLIC BLOOD PRESSURE: 67 MMHG | HEART RATE: 78 BPM | WEIGHT: 145.2 LBS

## 2020-01-01 VITALS
HEIGHT: 65 IN | DIASTOLIC BLOOD PRESSURE: 81 MMHG | TEMPERATURE: 98.1 F | HEART RATE: 93 BPM | SYSTOLIC BLOOD PRESSURE: 139 MMHG | WEIGHT: 152.2 LBS | BODY MASS INDEX: 25.36 KG/M2

## 2020-01-01 LAB
A/G RATIO: 1 (ref 1.1–2.2)
A/G RATIO: 1.2 (ref 1.1–2.2)
A/G RATIO: 1.3 (ref 1.1–2.2)
A/G RATIO: 1.4 (ref 1.1–2.2)
ABO/RH: NORMAL
ALBUMIN SERPL-MCNC: 3.2 G/DL (ref 3.4–5)
ALBUMIN SERPL-MCNC: 3.3 G/DL (ref 3.4–5)
ALBUMIN SERPL-MCNC: 3.5 G/DL (ref 3.4–5)
ALBUMIN SERPL-MCNC: 3.5 G/DL (ref 3.4–5)
ALBUMIN SERPL-MCNC: 3.7 G/DL (ref 3.4–5)
ALBUMIN SERPL-MCNC: 3.8 G/DL (ref 3.4–5)
ALBUMIN SERPL-MCNC: 4 G/DL (ref 3.4–5)
ALP BLD-CCNC: 102 U/L (ref 40–129)
ALP BLD-CCNC: 104 U/L (ref 40–129)
ALP BLD-CCNC: 116 U/L (ref 40–129)
ALP BLD-CCNC: 118 U/L (ref 40–129)
ALP BLD-CCNC: 80 U/L (ref 40–129)
ALP BLD-CCNC: 93 U/L (ref 40–129)
ALP BLD-CCNC: 97 U/L (ref 40–129)
ALT SERPL-CCNC: 13 U/L (ref 10–40)
ALT SERPL-CCNC: 23 U/L (ref 10–40)
ALT SERPL-CCNC: 339 U/L (ref 10–40)
ALT SERPL-CCNC: 7 U/L (ref 10–40)
ALT SERPL-CCNC: 8 U/L (ref 10–40)
ALT SERPL-CCNC: 9 U/L (ref 10–40)
ALT SERPL-CCNC: 9 U/L (ref 10–40)
AMORPHOUS: ABNORMAL /HPF
ANION GAP SERPL CALCULATED.3IONS-SCNC: 10 MMOL/L (ref 3–16)
ANION GAP SERPL CALCULATED.3IONS-SCNC: 11 MMOL/L (ref 3–16)
ANION GAP SERPL CALCULATED.3IONS-SCNC: 11 MMOL/L (ref 3–16)
ANION GAP SERPL CALCULATED.3IONS-SCNC: 12 MMOL/L (ref 3–16)
ANION GAP SERPL CALCULATED.3IONS-SCNC: 13 MMOL/L (ref 3–16)
ANION GAP SERPL CALCULATED.3IONS-SCNC: 14 MMOL/L (ref 3–16)
ANION GAP SERPL CALCULATED.3IONS-SCNC: 15 MMOL/L (ref 3–16)
ANION GAP SERPL CALCULATED.3IONS-SCNC: 7 MMOL/L (ref 3–16)
ANION GAP SERPL CALCULATED.3IONS-SCNC: 8 MMOL/L (ref 3–16)
ANION GAP SERPL CALCULATED.3IONS-SCNC: 8 MMOL/L (ref 3–16)
ANISOCYTOSIS: ABNORMAL
ANISOCYTOSIS: ABNORMAL
ANTIBODY SCREEN: NORMAL
AST SERPL-CCNC: 18 U/L (ref 15–37)
AST SERPL-CCNC: 22 U/L (ref 15–37)
AST SERPL-CCNC: 23 U/L (ref 15–37)
AST SERPL-CCNC: 27 U/L (ref 15–37)
AST SERPL-CCNC: 44 U/L (ref 15–37)
AST SERPL-CCNC: 46 U/L (ref 15–37)
AST SERPL-CCNC: 640 U/L (ref 15–37)
BACTERIA: ABNORMAL /HPF
BACTERIA: ABNORMAL /HPF
BASE EXCESS VENOUS: -1.7 MMOL/L (ref -3–3)
BASE EXCESS VENOUS: -2.2 MMOL/L (ref -3–3)
BASE EXCESS VENOUS: -9.4 MMOL/L (ref -3–3)
BASOPHILS ABSOLUTE: 0 K/UL (ref 0–0.2)
BASOPHILS ABSOLUTE: 0.1 K/UL (ref 0–0.2)
BASOPHILS RELATIVE PERCENT: 0 %
BASOPHILS RELATIVE PERCENT: 0 %
BASOPHILS RELATIVE PERCENT: 0.3 %
BASOPHILS RELATIVE PERCENT: 0.4 %
BASOPHILS RELATIVE PERCENT: 0.6 %
BASOPHILS RELATIVE PERCENT: 0.8 %
BASOPHILS RELATIVE PERCENT: 1.5 %
BILIRUB SERPL-MCNC: 0.3 MG/DL (ref 0–1)
BILIRUB SERPL-MCNC: 0.5 MG/DL (ref 0–1)
BILIRUB SERPL-MCNC: 0.5 MG/DL (ref 0–1)
BILIRUB SERPL-MCNC: 0.6 MG/DL (ref 0–1)
BILIRUB SERPL-MCNC: 0.6 MG/DL (ref 0–1)
BILIRUB SERPL-MCNC: 0.7 MG/DL (ref 0–1)
BILIRUB SERPL-MCNC: 1.3 MG/DL (ref 0–1)
BILIRUBIN DIRECT: <0.2 MG/DL (ref 0–0.3)
BILIRUBIN URINE: NEGATIVE
BILIRUBIN, INDIRECT: ABNORMAL MG/DL (ref 0–1)
BLOOD CULTURE, ROUTINE: NORMAL
BLOOD, URINE: ABNORMAL
BLOOD, URINE: NEGATIVE
BLOOD, URINE: NEGATIVE
BUN BLDV-MCNC: 19 MG/DL (ref 7–20)
BUN BLDV-MCNC: 20 MG/DL (ref 7–20)
BUN BLDV-MCNC: 20 MG/DL (ref 7–20)
BUN BLDV-MCNC: 21 MG/DL (ref 7–20)
BUN BLDV-MCNC: 23 MG/DL (ref 7–20)
BUN BLDV-MCNC: 25 MG/DL (ref 7–20)
BUN BLDV-MCNC: 25 MG/DL (ref 7–20)
BUN BLDV-MCNC: 26 MG/DL (ref 7–20)
BUN BLDV-MCNC: 26 MG/DL (ref 7–20)
BUN BLDV-MCNC: 28 MG/DL (ref 7–20)
BUN BLDV-MCNC: 28 MG/DL (ref 7–20)
BUN BLDV-MCNC: 30 MG/DL (ref 7–20)
BUN BLDV-MCNC: 34 MG/DL (ref 7–20)
BUN BLDV-MCNC: 65 MG/DL (ref 7–20)
BURR CELLS: ABNORMAL
CALCIUM SERPL-MCNC: 7.8 MG/DL (ref 8.3–10.6)
CALCIUM SERPL-MCNC: 7.9 MG/DL (ref 8.3–10.6)
CALCIUM SERPL-MCNC: 8.1 MG/DL (ref 8.3–10.6)
CALCIUM SERPL-MCNC: 8.2 MG/DL (ref 8.3–10.6)
CALCIUM SERPL-MCNC: 8.2 MG/DL (ref 8.3–10.6)
CALCIUM SERPL-MCNC: 8.3 MG/DL (ref 8.3–10.6)
CALCIUM SERPL-MCNC: 8.3 MG/DL (ref 8.3–10.6)
CALCIUM SERPL-MCNC: 8.4 MG/DL (ref 8.3–10.6)
CALCIUM SERPL-MCNC: 8.4 MG/DL (ref 8.3–10.6)
CALCIUM SERPL-MCNC: 8.5 MG/DL (ref 8.3–10.6)
CALCIUM SERPL-MCNC: 8.6 MG/DL (ref 8.3–10.6)
CALCIUM SERPL-MCNC: 8.9 MG/DL (ref 8.3–10.6)
CALCIUM SERPL-MCNC: 9.2 MG/DL (ref 8.3–10.6)
CALCIUM SERPL-MCNC: 9.3 MG/DL (ref 8.3–10.6)
CARBOXYHEMOGLOBIN: 10.5 % (ref 0–1.5)
CARBOXYHEMOGLOBIN: 7.3 % (ref 0–1.5)
CARBOXYHEMOGLOBIN: 7.5 % (ref 0–1.5)
CEA: 5.4 NG/ML (ref 0–5)
CHLORIDE BLD-SCNC: 102 MMOL/L (ref 99–110)
CHLORIDE BLD-SCNC: 102 MMOL/L (ref 99–110)
CHLORIDE BLD-SCNC: 103 MMOL/L (ref 99–110)
CHLORIDE BLD-SCNC: 104 MMOL/L (ref 99–110)
CHLORIDE BLD-SCNC: 104 MMOL/L (ref 99–110)
CHLORIDE BLD-SCNC: 105 MMOL/L (ref 99–110)
CHLORIDE BLD-SCNC: 106 MMOL/L (ref 99–110)
CHLORIDE BLD-SCNC: 107 MMOL/L (ref 99–110)
CHLORIDE BLD-SCNC: 108 MMOL/L (ref 99–110)
CHLORIDE BLD-SCNC: 97 MMOL/L (ref 99–110)
CHLORIDE BLD-SCNC: 99 MMOL/L (ref 99–110)
CHP ED QC CHECK: NORMAL
CHP ED QC CHECK: NORMAL
CLARITY: ABNORMAL
CLARITY: CLEAR
CLARITY: CLEAR
CO2: 20 MMOL/L (ref 21–32)
CO2: 20 MMOL/L (ref 21–32)
CO2: 21 MMOL/L (ref 21–32)
CO2: 22 MMOL/L (ref 21–32)
CO2: 23 MMOL/L (ref 21–32)
CO2: 23 MMOL/L (ref 21–32)
CO2: 24 MMOL/L (ref 21–32)
CO2: 25 MMOL/L (ref 21–32)
CO2: 26 MMOL/L (ref 21–32)
CO2: 27 MMOL/L (ref 21–32)
COLOR: YELLOW
CREAT SERPL-MCNC: 0.9 MG/DL (ref 0.8–1.3)
CREAT SERPL-MCNC: 1 MG/DL (ref 0.8–1.3)
CREAT SERPL-MCNC: 1.1 MG/DL (ref 0.8–1.3)
CREAT SERPL-MCNC: 1.2 MG/DL (ref 0.8–1.3)
CREAT SERPL-MCNC: 1.2 MG/DL (ref 0.8–1.3)
CREAT SERPL-MCNC: 1.3 MG/DL (ref 0.8–1.3)
CREAT SERPL-MCNC: 2.3 MG/DL (ref 0.8–1.3)
CRYSTALS, UA: ABNORMAL /HPF
CULTURE, BLOOD 2: NORMAL
EKG ATRIAL RATE: 101 BPM
EKG ATRIAL RATE: 105 BPM
EKG ATRIAL RATE: 127 BPM
EKG ATRIAL RATE: 52 BPM
EKG ATRIAL RATE: 96 BPM
EKG DIAGNOSIS: NORMAL
EKG P AXIS: 47 DEGREES
EKG P-R INTERVAL: 222 MS
EKG Q-T INTERVAL: 336 MS
EKG Q-T INTERVAL: 368 MS
EKG Q-T INTERVAL: 392 MS
EKG Q-T INTERVAL: 394 MS
EKG Q-T INTERVAL: 412 MS
EKG QRS DURATION: 76 MS
EKG QRS DURATION: 80 MS
EKG QRS DURATION: 84 MS
EKG QRS DURATION: 88 MS
EKG QRS DURATION: 92 MS
EKG QTC CALCULATION (BAZETT): 444 MS
EKG QTC CALCULATION (BAZETT): 457 MS
EKG QTC CALCULATION (BAZETT): 476 MS
EKG QTC CALCULATION (BAZETT): 483 MS
EKG QTC CALCULATION (BAZETT): 484 MS
EKG R AXIS: 15 DEGREES
EKG R AXIS: 4 DEGREES
EKG R AXIS: 40 DEGREES
EKG R AXIS: 42 DEGREES
EKG R AXIS: 47 DEGREES
EKG T AXIS: -35 DEGREES
EKG T AXIS: -38 DEGREES
EKG T AXIS: 11 DEGREES
EKG T AXIS: 39 DEGREES
EKG T AXIS: 46 DEGREES
EKG VENTRICULAR RATE: 104 BPM
EKG VENTRICULAR RATE: 105 BPM
EKG VENTRICULAR RATE: 74 BPM
EKG VENTRICULAR RATE: 88 BPM
EKG VENTRICULAR RATE: 92 BPM
EOSINOPHILS ABSOLUTE: 0 K/UL (ref 0–0.6)
EOSINOPHILS ABSOLUTE: 0.1 K/UL (ref 0–0.6)
EOSINOPHILS ABSOLUTE: 0.1 K/UL (ref 0–0.6)
EOSINOPHILS ABSOLUTE: 0.2 K/UL (ref 0–0.6)
EOSINOPHILS RELATIVE PERCENT: 0 %
EOSINOPHILS RELATIVE PERCENT: 0.1 %
EOSINOPHILS RELATIVE PERCENT: 0.2 %
EOSINOPHILS RELATIVE PERCENT: 0.2 %
EOSINOPHILS RELATIVE PERCENT: 1.2 %
EOSINOPHILS RELATIVE PERCENT: 1.4 %
EOSINOPHILS RELATIVE PERCENT: 2 %
EPITHELIAL CELLS, UA: ABNORMAL /HPF
EPITHELIAL CELLS, UA: ABNORMAL /HPF (ref 0–5)
ESTIMATED AVERAGE GLUCOSE: 108.3 MG/DL
ESTIMATED AVERAGE GLUCOSE: 111.2 MG/DL
ESTIMATED AVERAGE GLUCOSE: 119.8 MG/DL
FERRITIN: 13 NG/ML (ref 30–400)
FOLATE: 9.69 NG/ML (ref 4.78–24.2)
GFR AFRICAN AMERICAN: 32
GFR AFRICAN AMERICAN: >60
GFR NON-AFRICAN AMERICAN: 26
GFR NON-AFRICAN AMERICAN: 51
GFR NON-AFRICAN AMERICAN: 56
GFR NON-AFRICAN AMERICAN: 56
GFR NON-AFRICAN AMERICAN: >60
GLOBULIN: 2.3 G/DL
GLOBULIN: 2.6 G/DL
GLOBULIN: 2.8 G/DL
GLOBULIN: 2.8 G/DL
GLOBULIN: 2.9 G/DL
GLOBULIN: 3.7 G/DL
GLUCOSE BLD-MCNC: 101 MG/DL (ref 70–99)
GLUCOSE BLD-MCNC: 102 MG/DL (ref 70–99)
GLUCOSE BLD-MCNC: 102 MG/DL (ref 70–99)
GLUCOSE BLD-MCNC: 103 MG/DL (ref 70–99)
GLUCOSE BLD-MCNC: 107 MG/DL (ref 70–99)
GLUCOSE BLD-MCNC: 108 MG/DL (ref 70–99)
GLUCOSE BLD-MCNC: 110 MG/DL (ref 70–99)
GLUCOSE BLD-MCNC: 116 MG/DL (ref 70–99)
GLUCOSE BLD-MCNC: 119 MG/DL (ref 70–99)
GLUCOSE BLD-MCNC: 119 MG/DL (ref 70–99)
GLUCOSE BLD-MCNC: 122 MG/DL (ref 70–99)
GLUCOSE BLD-MCNC: 123 MG/DL (ref 70–99)
GLUCOSE BLD-MCNC: 123 MG/DL (ref 70–99)
GLUCOSE BLD-MCNC: 124 MG/DL (ref 70–99)
GLUCOSE BLD-MCNC: 125 MG/DL (ref 70–99)
GLUCOSE BLD-MCNC: 126 MG/DL (ref 70–99)
GLUCOSE BLD-MCNC: 127 MG/DL (ref 70–99)
GLUCOSE BLD-MCNC: 127 MG/DL (ref 70–99)
GLUCOSE BLD-MCNC: 130 MG/DL (ref 70–99)
GLUCOSE BLD-MCNC: 131 MG/DL (ref 70–99)
GLUCOSE BLD-MCNC: 131 MG/DL (ref 70–99)
GLUCOSE BLD-MCNC: 133 MG/DL (ref 70–99)
GLUCOSE BLD-MCNC: 134 MG/DL (ref 70–99)
GLUCOSE BLD-MCNC: 135 MG/DL (ref 70–99)
GLUCOSE BLD-MCNC: 136 MG/DL (ref 70–99)
GLUCOSE BLD-MCNC: 137 MG/DL (ref 70–99)
GLUCOSE BLD-MCNC: 139 MG/DL (ref 70–99)
GLUCOSE BLD-MCNC: 139 MG/DL (ref 70–99)
GLUCOSE BLD-MCNC: 140 MG/DL (ref 70–99)
GLUCOSE BLD-MCNC: 140 MG/DL (ref 70–99)
GLUCOSE BLD-MCNC: 141 MG/DL (ref 70–99)
GLUCOSE BLD-MCNC: 141 MG/DL (ref 70–99)
GLUCOSE BLD-MCNC: 143 MG/DL (ref 70–99)
GLUCOSE BLD-MCNC: 146 MG/DL (ref 70–99)
GLUCOSE BLD-MCNC: 146 MG/DL (ref 70–99)
GLUCOSE BLD-MCNC: 147 MG/DL (ref 70–99)
GLUCOSE BLD-MCNC: 148 MG/DL (ref 70–99)
GLUCOSE BLD-MCNC: 149 MG/DL (ref 70–99)
GLUCOSE BLD-MCNC: 150 MG/DL (ref 70–99)
GLUCOSE BLD-MCNC: 152 MG/DL (ref 70–99)
GLUCOSE BLD-MCNC: 153 MG/DL (ref 70–99)
GLUCOSE BLD-MCNC: 153 MG/DL (ref 70–99)
GLUCOSE BLD-MCNC: 155 MG/DL (ref 70–99)
GLUCOSE BLD-MCNC: 155 MG/DL (ref 70–99)
GLUCOSE BLD-MCNC: 157 MG/DL (ref 70–99)
GLUCOSE BLD-MCNC: 161 MG/DL (ref 70–99)
GLUCOSE BLD-MCNC: 161 MG/DL (ref 70–99)
GLUCOSE BLD-MCNC: 163 MG/DL (ref 70–99)
GLUCOSE BLD-MCNC: 163 MG/DL (ref 70–99)
GLUCOSE BLD-MCNC: 166 MG/DL (ref 70–99)
GLUCOSE BLD-MCNC: 167 MG/DL (ref 70–99)
GLUCOSE BLD-MCNC: 168 MG/DL (ref 70–99)
GLUCOSE BLD-MCNC: 173 MG/DL (ref 70–99)
GLUCOSE BLD-MCNC: 174 MG/DL (ref 70–99)
GLUCOSE BLD-MCNC: 177 MG/DL (ref 70–99)
GLUCOSE BLD-MCNC: 179 MG/DL (ref 70–99)
GLUCOSE BLD-MCNC: 180 MG/DL (ref 70–99)
GLUCOSE BLD-MCNC: 183 MG/DL (ref 70–99)
GLUCOSE BLD-MCNC: 185 MG/DL (ref 70–99)
GLUCOSE BLD-MCNC: 186 MG/DL (ref 70–99)
GLUCOSE BLD-MCNC: 193 MG/DL (ref 70–99)
GLUCOSE BLD-MCNC: 195 MG/DL (ref 70–99)
GLUCOSE BLD-MCNC: 196 MG/DL (ref 70–99)
GLUCOSE BLD-MCNC: 198 MG/DL (ref 70–99)
GLUCOSE BLD-MCNC: 210 MG/DL (ref 70–99)
GLUCOSE BLD-MCNC: 219 MG/DL (ref 70–99)
GLUCOSE BLD-MCNC: 44 MG/DL (ref 70–99)
GLUCOSE BLD-MCNC: 54 MG/DL (ref 70–99)
GLUCOSE BLD-MCNC: 81 MG/DL (ref 70–99)
GLUCOSE BLD-MCNC: 83 MG/DL
GLUCOSE BLD-MCNC: 83 MG/DL (ref 70–99)
GLUCOSE BLD-MCNC: 89 MG/DL
GLUCOSE BLD-MCNC: 89 MG/DL (ref 70–99)
GLUCOSE BLD-MCNC: 90 MG/DL (ref 70–99)
GLUCOSE BLD-MCNC: 91 MG/DL (ref 70–99)
GLUCOSE BLD-MCNC: 92 MG/DL (ref 70–99)
GLUCOSE BLD-MCNC: 94 MG/DL (ref 70–99)
GLUCOSE BLD-MCNC: 98 MG/DL (ref 70–99)
GLUCOSE URINE: NEGATIVE MG/DL
HBA1C MFR BLD: 5.4 %
HBA1C MFR BLD: 5.5 %
HBA1C MFR BLD: 5.8 %
HCO3 VENOUS: 17.2 MMOL/L (ref 23–29)
HCO3 VENOUS: 20.8 MMOL/L (ref 23–29)
HCO3 VENOUS: 22.1 MMOL/L (ref 23–29)
HCT VFR BLD CALC: 26 % (ref 40.5–52.5)
HCT VFR BLD CALC: 26.3 % (ref 40.5–52.5)
HCT VFR BLD CALC: 26.3 % (ref 40.5–52.5)
HCT VFR BLD CALC: 26.5 % (ref 40.5–52.5)
HCT VFR BLD CALC: 27 % (ref 40.5–52.5)
HCT VFR BLD CALC: 27.2 % (ref 40.5–52.5)
HCT VFR BLD CALC: 27.9 % (ref 40.5–52.5)
HCT VFR BLD CALC: 29.8 % (ref 40.5–52.5)
HCT VFR BLD CALC: 30 % (ref 40.5–52.5)
HCT VFR BLD CALC: 30.6 % (ref 40.5–52.5)
HCT VFR BLD CALC: 32.2 % (ref 40.5–52.5)
HCT VFR BLD CALC: 32.9 % (ref 40.5–52.5)
HCT VFR BLD CALC: 39 % (ref 40.5–52.5)
HCT VFR BLD CALC: 41.5 % (ref 40.5–52.5)
HEMATOLOGY PATH CONSULT: NORMAL
HEMATOLOGY PATH CONSULT: YES
HEMOGLOBIN: 10 G/DL (ref 13.5–17.5)
HEMOGLOBIN: 10.1 G/DL (ref 13.5–17.5)
HEMOGLOBIN: 12.1 G/DL (ref 13.5–17.5)
HEMOGLOBIN: 12.7 G/DL (ref 13.5–17.5)
HEMOGLOBIN: 7.4 G/DL (ref 13.5–17.5)
HEMOGLOBIN: 7.6 G/DL (ref 13.5–17.5)
HEMOGLOBIN: 7.7 G/DL (ref 13.5–17.5)
HEMOGLOBIN: 7.7 G/DL (ref 13.5–17.5)
HEMOGLOBIN: 7.8 G/DL (ref 13.5–17.5)
HEMOGLOBIN: 7.8 G/DL (ref 13.5–17.5)
HEMOGLOBIN: 8 G/DL (ref 13.5–17.5)
HEMOGLOBIN: 8.2 G/DL (ref 13.5–17.5)
HYPOCHROMIA: ABNORMAL
HYPOCHROMIA: ABNORMAL
INR BLD: 1.07 (ref 0.86–1.14)
INR BLD: 1.19 (ref 0.86–1.14)
INR BLD: 2.27 (ref 0.86–1.14)
IRON SATURATION: 3 % (ref 20–50)
IRON: 12 UG/DL (ref 59–158)
KETONES, URINE: NEGATIVE MG/DL
LACTIC ACID, SEPSIS: 1.5 MMOL/L (ref 0.4–1.9)
LACTIC ACID, SEPSIS: 2.1 MMOL/L (ref 0.4–1.9)
LACTIC ACID, SEPSIS: 2.5 MMOL/L (ref 0.4–1.9)
LACTIC ACID: 1.4 MMOL/L (ref 0.4–2)
LACTIC ACID: 2.1 MMOL/L (ref 0.4–2)
LACTIC ACID: 4.2 MMOL/L (ref 0.4–2)
LEUKOCYTE ESTERASE, URINE: ABNORMAL
LEUKOCYTE ESTERASE, URINE: ABNORMAL
LEUKOCYTE ESTERASE, URINE: NEGATIVE
LIPASE: 11 U/L (ref 13–60)
LIPASE: 9 U/L (ref 13–60)
LYMPHOCYTES ABSOLUTE: 0.5 K/UL (ref 1–5.1)
LYMPHOCYTES ABSOLUTE: 0.7 K/UL (ref 1–5.1)
LYMPHOCYTES ABSOLUTE: 0.7 K/UL (ref 1–5.1)
LYMPHOCYTES ABSOLUTE: 0.9 K/UL (ref 1–5.1)
LYMPHOCYTES ABSOLUTE: 1 K/UL (ref 1–5.1)
LYMPHOCYTES ABSOLUTE: 1 K/UL (ref 1–5.1)
LYMPHOCYTES ABSOLUTE: 1.2 K/UL (ref 1–5.1)
LYMPHOCYTES ABSOLUTE: 1.3 K/UL (ref 1–5.1)
LYMPHOCYTES ABSOLUTE: 2.1 K/UL (ref 1–5.1)
LYMPHOCYTES RELATIVE PERCENT: 11.7 %
LYMPHOCYTES RELATIVE PERCENT: 15.8 %
LYMPHOCYTES RELATIVE PERCENT: 22.5 %
LYMPHOCYTES RELATIVE PERCENT: 24 %
LYMPHOCYTES RELATIVE PERCENT: 3 %
LYMPHOCYTES RELATIVE PERCENT: 6.6 %
LYMPHOCYTES RELATIVE PERCENT: 7.4 %
LYMPHOCYTES RELATIVE PERCENT: 7.9 %
LYMPHOCYTES RELATIVE PERCENT: 9.2 %
MACROCYTES: ABNORMAL
MAGNESIUM: 2.1 MG/DL (ref 1.8–2.4)
MAGNESIUM: 2.2 MG/DL (ref 1.8–2.4)
MAGNESIUM: 2.7 MG/DL (ref 1.8–2.4)
MCH RBC QN AUTO: 18.1 PG (ref 26–34)
MCH RBC QN AUTO: 18.2 PG (ref 26–34)
MCH RBC QN AUTO: 18.6 PG (ref 26–34)
MCH RBC QN AUTO: 19 PG (ref 26–34)
MCH RBC QN AUTO: 19.8 PG (ref 26–34)
MCH RBC QN AUTO: 19.9 PG (ref 26–34)
MCH RBC QN AUTO: 19.9 PG (ref 26–34)
MCH RBC QN AUTO: 20.5 PG (ref 26–34)
MCH RBC QN AUTO: 20.6 PG (ref 26–34)
MCH RBC QN AUTO: 20.6 PG (ref 26–34)
MCH RBC QN AUTO: 24.2 PG (ref 26–34)
MCH RBC QN AUTO: 24.5 PG (ref 26–34)
MCH RBC QN AUTO: 24.7 PG (ref 26–34)
MCH RBC QN AUTO: 24.7 PG (ref 26–34)
MCHC RBC AUTO-ENTMCNC: 26 G/DL (ref 31–36)
MCHC RBC AUTO-ENTMCNC: 26.1 G/DL (ref 31–36)
MCHC RBC AUTO-ENTMCNC: 27.2 G/DL (ref 31–36)
MCHC RBC AUTO-ENTMCNC: 27.8 G/DL (ref 31–36)
MCHC RBC AUTO-ENTMCNC: 28 G/DL (ref 31–36)
MCHC RBC AUTO-ENTMCNC: 28 G/DL (ref 31–36)
MCHC RBC AUTO-ENTMCNC: 28.2 G/DL (ref 31–36)
MCHC RBC AUTO-ENTMCNC: 28.3 G/DL (ref 31–36)
MCHC RBC AUTO-ENTMCNC: 29 G/DL (ref 31–36)
MCHC RBC AUTO-ENTMCNC: 29.1 G/DL (ref 31–36)
MCHC RBC AUTO-ENTMCNC: 30.6 G/DL (ref 31–36)
MCHC RBC AUTO-ENTMCNC: 30.8 G/DL (ref 31–36)
MCHC RBC AUTO-ENTMCNC: 31 G/DL (ref 31–36)
MCHC RBC AUTO-ENTMCNC: 31.1 G/DL (ref 31–36)
MCV RBC AUTO: 68.2 FL (ref 80–100)
MCV RBC AUTO: 68.4 FL (ref 80–100)
MCV RBC AUTO: 69.4 FL (ref 80–100)
MCV RBC AUTO: 70.1 FL (ref 80–100)
MCV RBC AUTO: 70.5 FL (ref 80–100)
MCV RBC AUTO: 70.6 FL (ref 80–100)
MCV RBC AUTO: 70.7 FL (ref 80–100)
MCV RBC AUTO: 70.9 FL (ref 80–100)
MCV RBC AUTO: 71 FL (ref 80–100)
MCV RBC AUTO: 72.4 FL (ref 80–100)
MCV RBC AUTO: 78.7 FL (ref 80–100)
MCV RBC AUTO: 79.1 FL (ref 80–100)
MCV RBC AUTO: 79.8 FL (ref 80–100)
MCV RBC AUTO: 80.1 FL (ref 80–100)
METHEMOGLOBIN VENOUS: 0.3 %
MICROCYTES: ABNORMAL
MICROSCOPIC EXAMINATION: NORMAL
MICROSCOPIC EXAMINATION: YES
MICROSCOPIC EXAMINATION: YES
MONOCYTES ABSOLUTE: 0.4 K/UL (ref 0–1.3)
MONOCYTES ABSOLUTE: 0.6 K/UL (ref 0–1.3)
MONOCYTES ABSOLUTE: 0.7 K/UL (ref 0–1.3)
MONOCYTES ABSOLUTE: 0.8 K/UL (ref 0–1.3)
MONOCYTES ABSOLUTE: 0.9 K/UL (ref 0–1.3)
MONOCYTES ABSOLUTE: 1 K/UL (ref 0–1.3)
MONOCYTES ABSOLUTE: 1.1 K/UL (ref 0–1.3)
MONOCYTES ABSOLUTE: 1.2 K/UL (ref 0–1.3)
MONOCYTES ABSOLUTE: 1.3 K/UL (ref 0–1.3)
MONOCYTES RELATIVE PERCENT: 10.7 %
MONOCYTES RELATIVE PERCENT: 12.2 %
MONOCYTES RELATIVE PERCENT: 13.2 %
MONOCYTES RELATIVE PERCENT: 5 %
MONOCYTES RELATIVE PERCENT: 7 %
MONOCYTES RELATIVE PERCENT: 7.3 %
MONOCYTES RELATIVE PERCENT: 8.5 %
MONOCYTES RELATIVE PERCENT: 8.5 %
MONOCYTES RELATIVE PERCENT: 9.9 %
NEUTROPHILS ABSOLUTE: 10.2 K/UL (ref 1.7–7.7)
NEUTROPHILS ABSOLUTE: 15.6 K/UL (ref 1.7–7.7)
NEUTROPHILS ABSOLUTE: 3.9 K/UL (ref 1.7–7.7)
NEUTROPHILS ABSOLUTE: 5.6 K/UL (ref 1.7–7.7)
NEUTROPHILS ABSOLUTE: 5.9 K/UL (ref 1.7–7.7)
NEUTROPHILS ABSOLUTE: 6.6 K/UL (ref 1.7–7.7)
NEUTROPHILS ABSOLUTE: 7.6 K/UL (ref 1.7–7.7)
NEUTROPHILS ABSOLUTE: 8 K/UL (ref 1.7–7.7)
NEUTROPHILS ABSOLUTE: 8.1 K/UL (ref 1.7–7.7)
NEUTROPHILS RELATIVE PERCENT: 64.8 %
NEUTROPHILS RELATIVE PERCENT: 69 %
NEUTROPHILS RELATIVE PERCENT: 73 %
NEUTROPHILS RELATIVE PERCENT: 75.5 %
NEUTROPHILS RELATIVE PERCENT: 79.8 %
NEUTROPHILS RELATIVE PERCENT: 80.4 %
NEUTROPHILS RELATIVE PERCENT: 83.4 %
NEUTROPHILS RELATIVE PERCENT: 83.8 %
NEUTROPHILS RELATIVE PERCENT: 90 %
NITRITE, URINE: NEGATIVE
NITRITE, URINE: NEGATIVE
NITRITE, URINE: POSITIVE
NUCLEATED RED BLOOD CELLS: 12 /100 WBC
NUCLEATED RED BLOOD CELLS: 12 /100 WBC
O2 CONTENT, VEN: 10 VOL %
O2 CONTENT, VEN: 12 VOL %
O2 CONTENT, VEN: 14 VOL %
O2 SAT, VEN: 79 %
O2 SAT, VEN: 91 %
O2 SAT, VEN: 96 %
O2 THERAPY: ABNORMAL
OCCULT BLOOD DIAGNOSTIC: NORMAL
ORGANISM: ABNORMAL
OVALOCYTES: ABNORMAL
OVALOCYTES: ABNORMAL
PCO2, VEN: 30.7 MMHG (ref 40–50)
PCO2, VEN: 33.9 MMHG (ref 40–50)
PCO2, VEN: 40.1 MMHG (ref 40–50)
PDW BLD-RTO: 16.7 % (ref 12.4–15.4)
PDW BLD-RTO: 16.8 % (ref 12.4–15.4)
PDW BLD-RTO: 17 % (ref 12.4–15.4)
PDW BLD-RTO: 17.5 % (ref 12.4–15.4)
PDW BLD-RTO: 18.8 % (ref 12.4–15.4)
PDW BLD-RTO: 18.9 % (ref 12.4–15.4)
PDW BLD-RTO: 19 % (ref 12.4–15.4)
PDW BLD-RTO: 19.8 % (ref 12.4–15.4)
PDW BLD-RTO: 19.9 % (ref 12.4–15.4)
PDW BLD-RTO: 20.1 % (ref 12.4–15.4)
PDW BLD-RTO: 20.1 % (ref 12.4–15.4)
PDW BLD-RTO: 20.3 % (ref 12.4–15.4)
PDW BLD-RTO: 20.5 % (ref 12.4–15.4)
PDW BLD-RTO: 21.3 % (ref 12.4–15.4)
PERFORMED ON: ABNORMAL
PERFORMED ON: NORMAL
PH UA: 5.5 (ref 5–8)
PH UA: 6.5 (ref 5–8)
PH UA: 7 (ref 5–8)
PH VENOUS: 7.25 (ref 7.35–7.45)
PH VENOUS: 7.43 (ref 7.35–7.45)
PH VENOUS: 7.45 (ref 7.35–7.45)
PHOSPHORUS: 2.3 MG/DL (ref 2.5–4.9)
PHOSPHORUS: 3.1 MG/DL (ref 2.5–4.9)
PHOSPHORUS: 3.4 MG/DL (ref 2.5–4.9)
PLATELET # BLD: 197 K/UL (ref 135–450)
PLATELET # BLD: 201 K/UL (ref 135–450)
PLATELET # BLD: 205 K/UL (ref 135–450)
PLATELET # BLD: 227 K/UL (ref 135–450)
PLATELET # BLD: 241 K/UL (ref 135–450)
PLATELET # BLD: 244 K/UL (ref 135–450)
PLATELET # BLD: 257 K/UL (ref 135–450)
PLATELET # BLD: 264 K/UL (ref 135–450)
PLATELET # BLD: 275 K/UL (ref 135–450)
PLATELET # BLD: 279 K/UL (ref 135–450)
PLATELET # BLD: 281 K/UL (ref 135–450)
PLATELET # BLD: 294 K/UL (ref 135–450)
PLATELET # BLD: 301 K/UL (ref 135–450)
PLATELET # BLD: 303 K/UL (ref 135–450)
PLATELET SLIDE REVIEW: ADEQUATE
PMV BLD AUTO: 7.4 FL (ref 5–10.5)
PMV BLD AUTO: 7.5 FL (ref 5–10.5)
PMV BLD AUTO: 7.5 FL (ref 5–10.5)
PMV BLD AUTO: 7.6 FL (ref 5–10.5)
PMV BLD AUTO: 7.6 FL (ref 5–10.5)
PMV BLD AUTO: 7.7 FL (ref 5–10.5)
PMV BLD AUTO: 7.8 FL (ref 5–10.5)
PMV BLD AUTO: 8.2 FL (ref 5–10.5)
PO2, VEN: 40.3 MMHG (ref 25–40)
PO2, VEN: 56.7 MMHG (ref 25–40)
PO2, VEN: 91 MMHG (ref 25–40)
POIKILOCYTES: ABNORMAL
POTASSIUM REFLEX MAGNESIUM: 3.8 MMOL/L (ref 3.5–5.1)
POTASSIUM REFLEX MAGNESIUM: 4.1 MMOL/L (ref 3.5–5.1)
POTASSIUM REFLEX MAGNESIUM: 4.3 MMOL/L (ref 3.5–5.1)
POTASSIUM REFLEX MAGNESIUM: 4.4 MMOL/L (ref 3.5–5.1)
POTASSIUM REFLEX MAGNESIUM: 4.5 MMOL/L (ref 3.5–5.1)
POTASSIUM SERPL-SCNC: 3.6 MMOL/L (ref 3.5–5.1)
POTASSIUM SERPL-SCNC: 3.7 MMOL/L (ref 3.5–5.1)
POTASSIUM SERPL-SCNC: 3.8 MMOL/L (ref 3.5–5.1)
POTASSIUM SERPL-SCNC: 4.2 MMOL/L (ref 3.5–5.1)
POTASSIUM SERPL-SCNC: 4.5 MMOL/L (ref 3.5–5.1)
POTASSIUM SERPL-SCNC: 4.7 MMOL/L (ref 3.5–5.1)
POTASSIUM SERPL-SCNC: 6.2 MMOL/L (ref 3.5–5.1)
PRO-BNP: 2256 PG/ML (ref 0–449)
PRO-BNP: 3675 PG/ML (ref 0–449)
PRO-BNP: 770 PG/ML (ref 0–449)
PRO-BNP: ABNORMAL PG/ML (ref 0–449)
PROCALCITONIN: 0.11 NG/ML (ref 0–0.15)
PROTEIN UA: 30 MG/DL
PROTEIN UA: NEGATIVE MG/DL
PROTEIN UA: NEGATIVE MG/DL
PROTHROMBIN TIME: 12.4 SEC (ref 10–13.2)
PROTHROMBIN TIME: 13.7 SEC (ref 10–13.2)
PROTHROMBIN TIME: 25.7 SEC (ref 10–13.2)
RBC # BLD: 3.68 M/UL (ref 4.2–5.9)
RBC # BLD: 3.71 M/UL (ref 4.2–5.9)
RBC # BLD: 3.72 M/UL (ref 4.2–5.9)
RBC # BLD: 3.75 M/UL (ref 4.2–5.9)
RBC # BLD: 3.76 M/UL (ref 4.2–5.9)
RBC # BLD: 3.82 M/UL (ref 4.2–5.9)
RBC # BLD: 4.09 M/UL (ref 4.2–5.9)
RBC # BLD: 4.1 M/UL (ref 4.2–5.9)
RBC # BLD: 4.11 M/UL (ref 4.2–5.9)
RBC # BLD: 4.25 M/UL (ref 4.2–5.9)
RBC # BLD: 4.39 M/UL (ref 4.2–5.9)
RBC # BLD: 4.41 M/UL (ref 4.2–5.9)
RBC # BLD: 4.89 M/UL (ref 4.2–5.9)
RBC # BLD: 5.24 M/UL (ref 4.2–5.9)
RBC UA: ABNORMAL /HPF (ref 0–2)
RBC UA: ABNORMAL /HPF (ref 0–4)
SARS-COV-2, NAA: ABNORMAL
SARS-COV-2, NAAT: NOT DETECTED
SARS-COV-2, NAAT: NOT DETECTED
SARS-COV-2, PCR: NOT DETECTED
SLIDE REVIEW: ABNORMAL
SODIUM BLD-SCNC: 132 MMOL/L (ref 136–145)
SODIUM BLD-SCNC: 135 MMOL/L (ref 136–145)
SODIUM BLD-SCNC: 136 MMOL/L (ref 136–145)
SODIUM BLD-SCNC: 137 MMOL/L (ref 136–145)
SODIUM BLD-SCNC: 138 MMOL/L (ref 136–145)
SODIUM BLD-SCNC: 139 MMOL/L (ref 136–145)
SODIUM BLD-SCNC: 140 MMOL/L (ref 136–145)
SODIUM BLD-SCNC: 140 MMOL/L (ref 136–145)
SODIUM BLD-SCNC: 142 MMOL/L (ref 136–145)
SODIUM BLD-SCNC: 143 MMOL/L (ref 136–145)
SPECIFIC GRAVITY UA: 1.02 (ref 1–1.03)
SPECIFIC GRAVITY UA: 1.02 (ref 1–1.03)
SPECIFIC GRAVITY UA: >=1.03 (ref 1–1.03)
TARGET CELLS: ABNORMAL
TCO2 CALC VENOUS: 18 MMOL/L
TCO2 CALC VENOUS: 22 MMOL/L
TCO2 CALC VENOUS: 23 MMOL/L
TEAR DROP CELLS: ABNORMAL
TOTAL IRON BINDING CAPACITY: 408 UG/DL (ref 260–445)
TOTAL PROTEIN: 5.6 G/DL (ref 6.4–8.2)
TOTAL PROTEIN: 5.9 G/DL (ref 6.4–8.2)
TOTAL PROTEIN: 6.1 G/DL (ref 6.4–8.2)
TOTAL PROTEIN: 6.4 G/DL (ref 6.4–8.2)
TOTAL PROTEIN: 6.6 G/DL (ref 6.4–8.2)
TOTAL PROTEIN: 6.8 G/DL (ref 6.4–8.2)
TOTAL PROTEIN: 7.4 G/DL (ref 6.4–8.2)
TROPONIN: 0.03 NG/ML
TROPONIN: 0.03 NG/ML
TROPONIN: 0.04 NG/ML
TROPONIN: 0.05 NG/ML
TROPONIN: 0.05 NG/ML
TSH REFLEX: 3.23 UIU/ML (ref 0.27–4.2)
URINE CULTURE, ROUTINE: ABNORMAL
URINE CULTURE, ROUTINE: ABNORMAL
URINE CULTURE, ROUTINE: NORMAL
URINE REFLEX TO CULTURE: ABNORMAL
URINE REFLEX TO CULTURE: NORMAL
URINE TYPE: ABNORMAL
URINE TYPE: ABNORMAL
URINE TYPE: NORMAL
UROBILINOGEN, URINE: 1 E.U./DL
VITAMIN B-12: 342 PG/ML (ref 211–911)
WBC # BLD: 10.1 K/UL (ref 4–11)
WBC # BLD: 12.2 K/UL (ref 4–11)
WBC # BLD: 17.3 K/UL (ref 4–11)
WBC # BLD: 5.1 K/UL (ref 4–11)
WBC # BLD: 5.9 K/UL (ref 4–11)
WBC # BLD: 6.7 K/UL (ref 4–11)
WBC # BLD: 7.6 K/UL (ref 4–11)
WBC # BLD: 8.4 K/UL (ref 4–11)
WBC # BLD: 8.6 K/UL (ref 4–11)
WBC # BLD: 8.6 K/UL (ref 4–11)
WBC # BLD: 8.8 K/UL (ref 4–11)
WBC # BLD: 9.1 K/UL (ref 4–11)
WBC # BLD: 9.3 K/UL (ref 4–11)
WBC # BLD: 9.9 K/UL (ref 4–11)
WBC UA: ABNORMAL /HPF (ref 0–5)
WBC UA: ABNORMAL /HPF (ref 0–5)

## 2020-01-01 PROCEDURE — 6370000000 HC RX 637 (ALT 250 FOR IP): Performed by: INTERNAL MEDICINE

## 2020-01-01 PROCEDURE — 2580000003 HC RX 258: Performed by: HOSPITALIST

## 2020-01-01 PROCEDURE — 6360000002 HC RX W HCPCS: Performed by: INTERNAL MEDICINE

## 2020-01-01 PROCEDURE — 81001 URINALYSIS AUTO W/SCOPE: CPT

## 2020-01-01 PROCEDURE — U0002 COVID-19 LAB TEST NON-CDC: HCPCS

## 2020-01-01 PROCEDURE — 99232 SBSQ HOSP IP/OBS MODERATE 35: CPT | Performed by: INTERNAL MEDICINE

## 2020-01-01 PROCEDURE — 80048 BASIC METABOLIC PNL TOTAL CA: CPT

## 2020-01-01 PROCEDURE — 2580000003 HC RX 258

## 2020-01-01 PROCEDURE — 97116 GAIT TRAINING THERAPY: CPT

## 2020-01-01 PROCEDURE — 2580000003 HC RX 258: Performed by: INTERNAL MEDICINE

## 2020-01-01 PROCEDURE — 85027 COMPLETE CBC AUTOMATED: CPT

## 2020-01-01 PROCEDURE — 94640 AIRWAY INHALATION TREATMENT: CPT

## 2020-01-01 PROCEDURE — 83735 ASSAY OF MAGNESIUM: CPT

## 2020-01-01 PROCEDURE — 2500000003 HC RX 250 WO HCPCS: Performed by: EMERGENCY MEDICINE

## 2020-01-01 PROCEDURE — 6360000002 HC RX W HCPCS: Performed by: SURGERY

## 2020-01-01 PROCEDURE — 7100000000 HC PACU RECOVERY - FIRST 15 MIN: Performed by: SURGERY

## 2020-01-01 PROCEDURE — 1200000000 HC SEMI PRIVATE

## 2020-01-01 PROCEDURE — 83605 ASSAY OF LACTIC ACID: CPT

## 2020-01-01 PROCEDURE — 97535 SELF CARE MNGMENT TRAINING: CPT

## 2020-01-01 PROCEDURE — 2580000003 HC RX 258: Performed by: SURGERY

## 2020-01-01 PROCEDURE — 85025 COMPLETE CBC W/AUTO DIFF WBC: CPT

## 2020-01-01 PROCEDURE — 93010 ELECTROCARDIOGRAM REPORT: CPT | Performed by: INTERNAL MEDICINE

## 2020-01-01 PROCEDURE — 36415 COLL VENOUS BLD VENIPUNCTURE: CPT

## 2020-01-01 PROCEDURE — 1111F DSCHRG MED/CURRENT MED MERGE: CPT | Performed by: INTERNAL MEDICINE

## 2020-01-01 PROCEDURE — 99202 OFFICE O/P NEW SF 15 MIN: CPT | Performed by: CLINICAL NURSE SPECIALIST

## 2020-01-01 PROCEDURE — 99223 1ST HOSP IP/OBS HIGH 75: CPT | Performed by: INTERNAL MEDICINE

## 2020-01-01 PROCEDURE — 97530 THERAPEUTIC ACTIVITIES: CPT

## 2020-01-01 PROCEDURE — 94761 N-INVAS EAR/PLS OXIMETRY MLT: CPT

## 2020-01-01 PROCEDURE — 87086 URINE CULTURE/COLONY COUNT: CPT

## 2020-01-01 PROCEDURE — 4040F PNEUMOC VAC/ADMIN/RCVD: CPT | Performed by: SURGERY

## 2020-01-01 PROCEDURE — 92526 ORAL FUNCTION THERAPY: CPT

## 2020-01-01 PROCEDURE — 99285 EMERGENCY DEPT VISIT HI MDM: CPT

## 2020-01-01 PROCEDURE — 6360000002 HC RX W HCPCS: Performed by: HOSPITALIST

## 2020-01-01 PROCEDURE — 93005 ELECTROCARDIOGRAM TRACING: CPT | Performed by: EMERGENCY MEDICINE

## 2020-01-01 PROCEDURE — 6370000000 HC RX 637 (ALT 250 FOR IP): Performed by: SURGERY

## 2020-01-01 PROCEDURE — G0008 ADMIN INFLUENZA VIRUS VAC: HCPCS

## 2020-01-01 PROCEDURE — 85610 PROTHROMBIN TIME: CPT

## 2020-01-01 PROCEDURE — 2580000003 HC RX 258: Performed by: ANESTHESIOLOGY

## 2020-01-01 PROCEDURE — 84100 ASSAY OF PHOSPHORUS: CPT

## 2020-01-01 PROCEDURE — 83880 ASSAY OF NATRIURETIC PEPTIDE: CPT

## 2020-01-01 PROCEDURE — 71045 X-RAY EXAM CHEST 1 VIEW: CPT

## 2020-01-01 PROCEDURE — 1123F ACP DISCUSS/DSCN MKR DOCD: CPT | Performed by: SURGERY

## 2020-01-01 PROCEDURE — 36556 INSERT NON-TUNNEL CV CATH: CPT

## 2020-01-01 PROCEDURE — 99024 POSTOP FOLLOW-UP VISIT: CPT | Performed by: SURGERY

## 2020-01-01 PROCEDURE — 83036 HEMOGLOBIN GLYCOSYLATED A1C: CPT

## 2020-01-01 PROCEDURE — 87040 BLOOD CULTURE FOR BACTERIA: CPT

## 2020-01-01 PROCEDURE — 3700000001 HC ADD 15 MINUTES (ANESTHESIA): Performed by: SURGERY

## 2020-01-01 PROCEDURE — 3700000000 HC ANESTHESIA ATTENDED CARE: Performed by: SURGERY

## 2020-01-01 PROCEDURE — 99203 OFFICE O/P NEW LOW 30 MIN: CPT | Performed by: SURGERY

## 2020-01-01 PROCEDURE — C1892 INTRO/SHEATH,FIXED,PEEL-AWAY: HCPCS | Performed by: SURGERY

## 2020-01-01 PROCEDURE — 99239 HOSP IP/OBS DSCHRG MGMT >30: CPT | Performed by: INTERNAL MEDICINE

## 2020-01-01 PROCEDURE — 90686 IIV4 VACC NO PRSV 0.5 ML IM: CPT

## 2020-01-01 PROCEDURE — 1111F DSCHRG MED/CURRENT MED MERGE: CPT | Performed by: SURGERY

## 2020-01-01 PROCEDURE — 82803 BLOOD GASES ANY COMBINATION: CPT

## 2020-01-01 PROCEDURE — U0003 INFECTIOUS AGENT DETECTION BY NUCLEIC ACID (DNA OR RNA); SEVERE ACUTE RESPIRATORY SYNDROME CORONAVIRUS 2 (SARS-COV-2) (CORONAVIRUS DISEASE [COVID-19]), AMPLIFIED PROBE TECHNIQUE, MAKING USE OF HIGH THROUGHPUT TECHNOLOGIES AS DESCRIBED BY CMS-2020-01-R: HCPCS

## 2020-01-01 PROCEDURE — 82746 ASSAY OF FOLIC ACID SERUM: CPT

## 2020-01-01 PROCEDURE — 84484 ASSAY OF TROPONIN QUANT: CPT

## 2020-01-01 PROCEDURE — 99212 OFFICE O/P EST SF 10 MIN: CPT | Performed by: INTERNAL MEDICINE

## 2020-01-01 PROCEDURE — 2580000003 HC RX 258: Performed by: EMERGENCY MEDICINE

## 2020-01-01 PROCEDURE — 92610 EVALUATE SWALLOWING FUNCTION: CPT

## 2020-01-01 PROCEDURE — 80076 HEPATIC FUNCTION PANEL: CPT

## 2020-01-01 PROCEDURE — 6360000002 HC RX W HCPCS

## 2020-01-01 PROCEDURE — 3600000014 HC SURGERY LEVEL 4 ADDTL 15MIN: Performed by: SURGERY

## 2020-01-01 PROCEDURE — 88309 TISSUE EXAM BY PATHOLOGIST: CPT

## 2020-01-01 PROCEDURE — 99291 CRITICAL CARE FIRST HOUR: CPT

## 2020-01-01 PROCEDURE — 2700000000 HC OXYGEN THERAPY PER DAY

## 2020-01-01 PROCEDURE — 2060000000 HC ICU INTERMEDIATE R&B

## 2020-01-01 PROCEDURE — 99213 OFFICE O/P EST LOW 20 MIN: CPT

## 2020-01-01 PROCEDURE — 80053 COMPREHEN METABOLIC PANEL: CPT

## 2020-01-01 PROCEDURE — 0DJD8ZZ INSPECTION OF LOWER INTESTINAL TRACT, VIA NATURAL OR ARTIFICIAL OPENING ENDOSCOPIC: ICD-10-PCS | Performed by: INTERNAL MEDICINE

## 2020-01-01 PROCEDURE — 82607 VITAMIN B-12: CPT

## 2020-01-01 PROCEDURE — 3609012400 HC EGD TRANSORAL BIOPSY SINGLE/MULTIPLE: Performed by: INTERNAL MEDICINE

## 2020-01-01 PROCEDURE — 6370000000 HC RX 637 (ALT 250 FOR IP): Performed by: EMERGENCY MEDICINE

## 2020-01-01 PROCEDURE — 87186 SC STD MICRODIL/AGAR DIL: CPT

## 2020-01-01 PROCEDURE — 81003 URINALYSIS AUTO W/O SCOPE: CPT

## 2020-01-01 PROCEDURE — 74018 RADEX ABDOMEN 1 VIEW: CPT

## 2020-01-01 PROCEDURE — 99238 HOSP IP/OBS DSCHRG MGMT 30/<: CPT | Performed by: INTERNAL MEDICINE

## 2020-01-01 PROCEDURE — 2500000003 HC RX 250 WO HCPCS: Performed by: NURSE ANESTHETIST, CERTIFIED REGISTERED

## 2020-01-01 PROCEDURE — 93308 TTE F-UP OR LMTD: CPT

## 2020-01-01 PROCEDURE — 99222 1ST HOSP IP/OBS MODERATE 55: CPT | Performed by: INTERNAL MEDICINE

## 2020-01-01 PROCEDURE — 3609010300 HC COLONOSCOPY W/BIOPSY SINGLE/MULTIPLE: Performed by: INTERNAL MEDICINE

## 2020-01-01 PROCEDURE — 96367 TX/PROPH/DG ADDL SEQ IV INF: CPT

## 2020-01-01 PROCEDURE — 2500000003 HC RX 250 WO HCPCS: Performed by: SURGERY

## 2020-01-01 PROCEDURE — 6360000002 HC RX W HCPCS: Performed by: NURSE ANESTHETIST, CERTIFIED REGISTERED

## 2020-01-01 PROCEDURE — 6360000004 HC RX CONTRAST MEDICATION: Performed by: INTERNAL MEDICINE

## 2020-01-01 PROCEDURE — 7100000010 HC PHASE II RECOVERY - FIRST 15 MIN: Performed by: INTERNAL MEDICINE

## 2020-01-01 PROCEDURE — 87077 CULTURE AEROBIC IDENTIFY: CPT

## 2020-01-01 PROCEDURE — G8427 DOCREV CUR MEDS BY ELIG CLIN: HCPCS | Performed by: SURGERY

## 2020-01-01 PROCEDURE — 94150 VITAL CAPACITY TEST: CPT

## 2020-01-01 PROCEDURE — 97110 THERAPEUTIC EXERCISES: CPT

## 2020-01-01 PROCEDURE — 6370000000 HC RX 637 (ALT 250 FOR IP): Performed by: PHYSICIAN ASSISTANT

## 2020-01-01 PROCEDURE — 83550 IRON BINDING TEST: CPT

## 2020-01-01 PROCEDURE — 97165 OT EVAL LOW COMPLEX 30 MIN: CPT

## 2020-01-01 PROCEDURE — 44143 PARTIAL REMOVAL OF COLON: CPT | Performed by: SURGERY

## 2020-01-01 PROCEDURE — 2709999900 HC NON-CHARGEABLE SUPPLY: Performed by: INTERNAL MEDICINE

## 2020-01-01 PROCEDURE — 96375 TX/PRO/DX INJ NEW DRUG ADDON: CPT

## 2020-01-01 PROCEDURE — 97166 OT EVAL MOD COMPLEX 45 MIN: CPT

## 2020-01-01 PROCEDURE — 0D1N0Z4 BYPASS SIGMOID COLON TO CUTANEOUS, OPEN APPROACH: ICD-10-PCS | Performed by: SURGERY

## 2020-01-01 PROCEDURE — 3700000000 HC ANESTHESIA ATTENDED CARE: Performed by: INTERNAL MEDICINE

## 2020-01-01 PROCEDURE — 2580000003 HC RX 258: Performed by: NURSE ANESTHETIST, CERTIFIED REGISTERED

## 2020-01-01 PROCEDURE — 83690 ASSAY OF LIPASE: CPT

## 2020-01-01 PROCEDURE — 0DTN0ZZ RESECTION OF SIGMOID COLON, OPEN APPROACH: ICD-10-PCS | Performed by: SURGERY

## 2020-01-01 PROCEDURE — 97597 DBRDMT OPN WND 1ST 20 CM/<: CPT

## 2020-01-01 PROCEDURE — 0DB98ZX EXCISION OF DUODENUM, VIA NATURAL OR ARTIFICIAL OPENING ENDOSCOPIC, DIAGNOSTIC: ICD-10-PCS | Performed by: INTERNAL MEDICINE

## 2020-01-01 PROCEDURE — 6360000002 HC RX W HCPCS: Performed by: EMERGENCY MEDICINE

## 2020-01-01 PROCEDURE — 96365 THER/PROPH/DIAG IV INF INIT: CPT

## 2020-01-01 PROCEDURE — 88305 TISSUE EXAM BY PATHOLOGIST: CPT

## 2020-01-01 PROCEDURE — 44160 REMOVAL OF COLON: CPT | Performed by: SURGERY

## 2020-01-01 PROCEDURE — C2626 INFUSION PUMP, NON-PROG,TEMP: HCPCS | Performed by: SURGERY

## 2020-01-01 PROCEDURE — 86901 BLOOD TYPING SEROLOGIC RH(D): CPT

## 2020-01-01 PROCEDURE — G8417 CALC BMI ABV UP PARAM F/U: HCPCS | Performed by: SURGERY

## 2020-01-01 PROCEDURE — 96374 THER/PROPH/DIAG INJ IV PUSH: CPT

## 2020-01-01 PROCEDURE — 97597 DBRDMT OPN WND 1ST 20 CM/<: CPT | Performed by: CLINICAL NURSE SPECIALIST

## 2020-01-01 PROCEDURE — 0DTF0ZZ RESECTION OF RIGHT LARGE INTESTINE, OPEN APPROACH: ICD-10-PCS | Performed by: SURGERY

## 2020-01-01 PROCEDURE — 86850 RBC ANTIBODY SCREEN: CPT

## 2020-01-01 PROCEDURE — 1036F TOBACCO NON-USER: CPT | Performed by: SURGERY

## 2020-01-01 PROCEDURE — 74176 CT ABD & PELVIS W/O CONTRAST: CPT

## 2020-01-01 PROCEDURE — 83540 ASSAY OF IRON: CPT

## 2020-01-01 PROCEDURE — 84443 ASSAY THYROID STIM HORMONE: CPT

## 2020-01-01 PROCEDURE — 82728 ASSAY OF FERRITIN: CPT

## 2020-01-01 PROCEDURE — 97162 PT EVAL MOD COMPLEX 30 MIN: CPT

## 2020-01-01 PROCEDURE — 99223 1ST HOSP IP/OBS HIGH 75: CPT | Performed by: SURGERY

## 2020-01-01 PROCEDURE — 7100000011 HC PHASE II RECOVERY - ADDTL 15 MIN: Performed by: INTERNAL MEDICINE

## 2020-01-01 PROCEDURE — 82378 CARCINOEMBRYONIC ANTIGEN: CPT

## 2020-01-01 PROCEDURE — 97161 PT EVAL LOW COMPLEX 20 MIN: CPT

## 2020-01-01 PROCEDURE — 7100000001 HC PACU RECOVERY - ADDTL 15 MIN: Performed by: SURGERY

## 2020-01-01 PROCEDURE — 99231 SBSQ HOSP IP/OBS SF/LOW 25: CPT | Performed by: SURGERY

## 2020-01-01 PROCEDURE — 2500000003 HC RX 250 WO HCPCS: Performed by: INTERNAL MEDICINE

## 2020-01-01 PROCEDURE — 2709999900 HC NON-CHARGEABLE SUPPLY: Performed by: SURGERY

## 2020-01-01 PROCEDURE — 2720000010 HC SURG SUPPLY STERILE: Performed by: SURGERY

## 2020-01-01 PROCEDURE — 74177 CT ABD & PELVIS W/CONTRAST: CPT

## 2020-01-01 PROCEDURE — G0328 FECAL BLOOD SCRN IMMUNOASSAY: HCPCS

## 2020-01-01 PROCEDURE — 3700000001 HC ADD 15 MINUTES (ANESTHESIA): Performed by: INTERNAL MEDICINE

## 2020-01-01 PROCEDURE — 2500000003 HC RX 250 WO HCPCS: Performed by: ANESTHESIOLOGY

## 2020-01-01 PROCEDURE — 99495 TRANSJ CARE MGMT MOD F2F 14D: CPT | Performed by: INTERNAL MEDICINE

## 2020-01-01 PROCEDURE — 84145 PROCALCITONIN (PCT): CPT

## 2020-01-01 PROCEDURE — 86900 BLOOD TYPING SEROLOGIC ABO: CPT

## 2020-01-01 PROCEDURE — 3600000004 HC SURGERY LEVEL 4 BASE: Performed by: SURGERY

## 2020-01-01 RX ORDER — TRIAMTERENE AND HYDROCHLOROTHIAZIDE 37.5; 25 MG/1; MG/1
CAPSULE ORAL
Qty: 30 CAPSULE | Refills: 0 | Status: SHIPPED | OUTPATIENT
Start: 2020-01-01 | End: 2020-01-01

## 2020-01-01 RX ORDER — HYDRALAZINE HYDROCHLORIDE 20 MG/ML
5 INJECTION INTRAMUSCULAR; INTRAVENOUS EVERY 10 MIN PRN
Status: DISCONTINUED | OUTPATIENT
Start: 2020-01-01 | End: 2020-01-01 | Stop reason: HOSPADM

## 2020-01-01 RX ORDER — 0.9 % SODIUM CHLORIDE 0.9 %
1000 INTRAVENOUS SOLUTION INTRAVENOUS ONCE
Status: COMPLETED | OUTPATIENT
Start: 2020-01-01 | End: 2020-01-01

## 2020-01-01 RX ORDER — ONDANSETRON 2 MG/ML
4 INJECTION INTRAMUSCULAR; INTRAVENOUS EVERY 4 HOURS PRN
Status: DISCONTINUED | OUTPATIENT
Start: 2020-01-01 | End: 2020-01-01 | Stop reason: HOSPADM

## 2020-01-01 RX ORDER — ISOSORBIDE DINITRATE 10 MG/1
TABLET ORAL
Qty: 90 TABLET | Refills: 0 | Status: SHIPPED | OUTPATIENT
Start: 2020-01-01 | End: 2020-01-01

## 2020-01-01 RX ORDER — AZITHROMYCIN 250 MG/1
TABLET, FILM COATED ORAL
Qty: 1 PACKET | Refills: 0 | Status: SHIPPED | OUTPATIENT
Start: 2020-01-01 | End: 2020-01-01 | Stop reason: ALTCHOICE

## 2020-01-01 RX ORDER — LABETALOL HYDROCHLORIDE 5 MG/ML
5 INJECTION, SOLUTION INTRAVENOUS EVERY 10 MIN PRN
Status: DISCONTINUED | OUTPATIENT
Start: 2020-01-01 | End: 2020-01-01 | Stop reason: HOSPADM

## 2020-01-01 RX ORDER — BUPIVACAINE HYDROCHLORIDE 5 MG/ML
INJECTION, SOLUTION EPIDURAL; INTRACAUDAL PRN
Status: DISCONTINUED | OUTPATIENT
Start: 2020-01-01 | End: 2020-01-01 | Stop reason: ALTCHOICE

## 2020-01-01 RX ORDER — MEPERIDINE HYDROCHLORIDE 25 MG/ML
12.5 INJECTION INTRAMUSCULAR; INTRAVENOUS; SUBCUTANEOUS EVERY 5 MIN PRN
Status: DISCONTINUED | OUTPATIENT
Start: 2020-01-01 | End: 2020-01-01 | Stop reason: HOSPADM

## 2020-01-01 RX ORDER — MAGNESIUM HYDROXIDE 1200 MG/15ML
LIQUID ORAL CONTINUOUS PRN
Status: COMPLETED | OUTPATIENT
Start: 2020-01-01 | End: 2020-01-01

## 2020-01-01 RX ORDER — SODIUM CHLORIDE 9 MG/ML
INJECTION, SOLUTION INTRAVENOUS CONTINUOUS
Status: DISCONTINUED | OUTPATIENT
Start: 2020-01-01 | End: 2020-01-01

## 2020-01-01 RX ORDER — POLYETHYLENE GLYCOL 3350 17 G/17G
17 POWDER, FOR SOLUTION ORAL DAILY
Qty: 527 G | Refills: 1 | Status: SHIPPED | OUTPATIENT
Start: 2020-01-01 | End: 2020-01-01

## 2020-01-01 RX ORDER — ROPINIROLE 1 MG/1
1 TABLET, FILM COATED ORAL NIGHTLY
Status: DISCONTINUED | OUTPATIENT
Start: 2020-01-01 | End: 2020-01-01 | Stop reason: HOSPADM

## 2020-01-01 RX ORDER — ISOSORBIDE DINITRATE 10 MG/1
10 TABLET ORAL 3 TIMES DAILY
Status: DISCONTINUED | OUTPATIENT
Start: 2020-01-01 | End: 2020-01-01 | Stop reason: HOSPADM

## 2020-01-01 RX ORDER — ROPINIROLE 0.25 MG/1
0.5 TABLET, FILM COATED ORAL NIGHTLY
Status: DISCONTINUED | OUTPATIENT
Start: 2020-01-01 | End: 2020-01-01 | Stop reason: HOSPADM

## 2020-01-01 RX ORDER — SODIUM CHLORIDE 0.9 % (FLUSH) 0.9 %
SYRINGE (ML) INJECTION
Status: DISPENSED
Start: 2020-01-01 | End: 2020-01-01

## 2020-01-01 RX ORDER — ROPINIROLE 1 MG/1
1 TABLET, FILM COATED ORAL NIGHTLY
Status: DISCONTINUED | OUTPATIENT
Start: 2020-01-01 | End: 2020-01-01

## 2020-01-01 RX ORDER — IPRATROPIUM BROMIDE AND ALBUTEROL SULFATE 2.5; .5 MG/3ML; MG/3ML
1 SOLUTION RESPIRATORY (INHALATION) 4 TIMES DAILY
Status: DISCONTINUED | OUTPATIENT
Start: 2020-01-01 | End: 2020-01-01 | Stop reason: HOSPADM

## 2020-01-01 RX ORDER — DEXTROSE MONOHYDRATE 25 G/50ML
25 INJECTION, SOLUTION INTRAVENOUS ONCE
Status: COMPLETED | OUTPATIENT
Start: 2020-01-01 | End: 2020-01-01

## 2020-01-01 RX ORDER — OXYCODONE HYDROCHLORIDE 5 MG/1
5 TABLET ORAL EVERY 4 HOURS PRN
Status: DISCONTINUED | OUTPATIENT
Start: 2020-01-01 | End: 2020-01-01 | Stop reason: HOSPADM

## 2020-01-01 RX ORDER — ALBUTEROL SULFATE 90 UG/1
2 AEROSOL, METERED RESPIRATORY (INHALATION)
Status: DISCONTINUED | OUTPATIENT
Start: 2020-01-01 | End: 2020-01-01

## 2020-01-01 RX ORDER — SODIUM CHLORIDE 9 MG/ML
INJECTION, SOLUTION INTRAVENOUS
Status: COMPLETED
Start: 2020-01-01 | End: 2020-01-01

## 2020-01-01 RX ORDER — POLYETHYLENE GLYCOL 3350 17 G/17G
17 POWDER, FOR SOLUTION ORAL DAILY
Status: DISCONTINUED | OUTPATIENT
Start: 2020-01-01 | End: 2020-01-01 | Stop reason: HOSPADM

## 2020-01-01 RX ORDER — POLYETHYLENE GLYCOL 3350 17 G/17G
119 POWDER, FOR SOLUTION ORAL ONCE
Status: COMPLETED | OUTPATIENT
Start: 2020-01-01 | End: 2020-01-01

## 2020-01-01 RX ORDER — SODIUM CHLORIDE 0.9 % (FLUSH) 0.9 %
10 SYRINGE (ML) INJECTION PRN
Status: DISCONTINUED | OUTPATIENT
Start: 2020-01-01 | End: 2020-01-01 | Stop reason: HOSPADM

## 2020-01-01 RX ORDER — OXYCODONE HYDROCHLORIDE AND ACETAMINOPHEN 5; 325 MG/1; MG/1
2 TABLET ORAL PRN
Status: DISCONTINUED | OUTPATIENT
Start: 2020-01-01 | End: 2020-01-01 | Stop reason: HOSPADM

## 2020-01-01 RX ORDER — TRAMADOL HYDROCHLORIDE 50 MG/1
50 TABLET ORAL EVERY 12 HOURS
Status: DISCONTINUED | OUTPATIENT
Start: 2020-01-01 | End: 2020-01-01 | Stop reason: HOSPADM

## 2020-01-01 RX ORDER — OXYCODONE HYDROCHLORIDE AND ACETAMINOPHEN 5; 325 MG/1; MG/1
1 TABLET ORAL PRN
Status: DISCONTINUED | OUTPATIENT
Start: 2020-01-01 | End: 2020-01-01 | Stop reason: HOSPADM

## 2020-01-01 RX ORDER — ACETAMINOPHEN 325 MG/1
650 TABLET ORAL EVERY 6 HOURS PRN
Status: DISCONTINUED | OUTPATIENT
Start: 2020-01-01 | End: 2020-01-01 | Stop reason: HOSPADM

## 2020-01-01 RX ORDER — TRAMADOL HYDROCHLORIDE 50 MG/1
50 TABLET ORAL EVERY 12 HOURS
Qty: 60 TABLET | Refills: 0 | Status: ON HOLD | OUTPATIENT
Start: 2020-01-01 | End: 2020-01-01 | Stop reason: HOSPADM

## 2020-01-01 RX ORDER — MORPHINE SULFATE 2 MG/ML
2 INJECTION, SOLUTION INTRAMUSCULAR; INTRAVENOUS ONCE
Status: COMPLETED | OUTPATIENT
Start: 2020-01-01 | End: 2020-01-01

## 2020-01-01 RX ORDER — NICOTINE POLACRILEX 4 MG
15 LOZENGE BUCCAL PRN
Status: DISCONTINUED | OUTPATIENT
Start: 2020-01-01 | End: 2020-01-01 | Stop reason: HOSPADM

## 2020-01-01 RX ORDER — SUCCINYLCHOLINE CHLORIDE 20 MG/ML
INJECTION INTRAMUSCULAR; INTRAVENOUS DAILY PRN
Status: COMPLETED | OUTPATIENT
Start: 2020-01-01 | End: 2020-01-01

## 2020-01-01 RX ORDER — ONDANSETRON 2 MG/ML
4 INJECTION INTRAMUSCULAR; INTRAVENOUS
Status: DISCONTINUED | OUTPATIENT
Start: 2020-01-01 | End: 2020-01-01 | Stop reason: HOSPADM

## 2020-01-01 RX ORDER — DEXTROSE AND SODIUM CHLORIDE 5; .45 G/100ML; G/100ML
INJECTION, SOLUTION INTRAVENOUS CONTINUOUS
Status: DISCONTINUED | OUTPATIENT
Start: 2020-01-01 | End: 2020-01-01

## 2020-01-01 RX ORDER — IPRATROPIUM BROMIDE AND ALBUTEROL SULFATE 2.5; .5 MG/3ML; MG/3ML
1 SOLUTION RESPIRATORY (INHALATION) EVERY 4 HOURS PRN
Status: DISCONTINUED | OUTPATIENT
Start: 2020-01-01 | End: 2020-01-01

## 2020-01-01 RX ORDER — ALBUTEROL SULFATE 90 UG/1
2 AEROSOL, METERED RESPIRATORY (INHALATION) EVERY 4 HOURS PRN
Qty: 1 INHALER | Refills: 3
Start: 2020-01-01 | End: 2020-01-01

## 2020-01-01 RX ORDER — TRIAMTERENE AND HYDROCHLOROTHIAZIDE 37.5; 25 MG/1; MG/1
1 TABLET ORAL DAILY
Status: DISCONTINUED | OUTPATIENT
Start: 2020-01-01 | End: 2020-01-01

## 2020-01-01 RX ORDER — ACETAMINOPHEN 325 MG/1
650 TABLET ORAL EVERY 4 HOURS PRN
Status: DISCONTINUED | OUTPATIENT
Start: 2020-01-01 | End: 2020-01-01 | Stop reason: HOSPADM

## 2020-01-01 RX ORDER — ROPINIROLE 1 MG/1
TABLET, FILM COATED ORAL
Qty: 30 TABLET | Refills: 0 | Status: SHIPPED | OUTPATIENT
Start: 2020-01-01

## 2020-01-01 RX ORDER — ACETAMINOPHEN 650 MG/1
650 SUPPOSITORY RECTAL EVERY 6 HOURS PRN
Status: DISCONTINUED | OUTPATIENT
Start: 2020-01-01 | End: 2020-01-01 | Stop reason: HOSPADM

## 2020-01-01 RX ORDER — LIDOCAINE 40 MG/G
CREAM TOPICAL ONCE
Status: DISCONTINUED | OUTPATIENT
Start: 2020-01-01 | End: 2020-01-01 | Stop reason: HOSPADM

## 2020-01-01 RX ORDER — SODIUM CHLORIDE, SODIUM LACTATE, POTASSIUM CHLORIDE, CALCIUM CHLORIDE 600; 310; 30; 20 MG/100ML; MG/100ML; MG/100ML; MG/100ML
INJECTION, SOLUTION INTRAVENOUS CONTINUOUS PRN
Status: DISCONTINUED | OUTPATIENT
Start: 2020-01-01 | End: 2020-01-01 | Stop reason: SDUPTHER

## 2020-01-01 RX ORDER — SODIUM CHLORIDE 0.9 % (FLUSH) 0.9 %
10 SYRINGE (ML) INJECTION EVERY 12 HOURS SCHEDULED
Status: DISCONTINUED | OUTPATIENT
Start: 2020-01-01 | End: 2020-01-01 | Stop reason: HOSPADM

## 2020-01-01 RX ORDER — MORPHINE SULFATE 10 MG/ML
1 INJECTION, SOLUTION INTRAMUSCULAR; INTRAVENOUS EVERY 5 MIN PRN
Status: DISCONTINUED | OUTPATIENT
Start: 2020-01-01 | End: 2020-01-01 | Stop reason: HOSPADM

## 2020-01-01 RX ORDER — DEXTROSE MONOHYDRATE 50 MG/ML
100 INJECTION, SOLUTION INTRAVENOUS PRN
Status: DISCONTINUED | OUTPATIENT
Start: 2020-01-01 | End: 2020-01-01 | Stop reason: HOSPADM

## 2020-01-01 RX ORDER — NOREPINEPHRINE BITARTRATE 1 MG/ML
INJECTION, SOLUTION INTRAVENOUS
Status: DISCONTINUED
Start: 2020-01-01 | End: 2020-01-01 | Stop reason: HOSPADM

## 2020-01-01 RX ORDER — ASPIRIN 81 MG/1
81 TABLET, CHEWABLE ORAL DAILY
Status: DISCONTINUED | OUTPATIENT
Start: 2020-01-01 | End: 2020-01-01 | Stop reason: HOSPADM

## 2020-01-01 RX ORDER — PROMETHAZINE HYDROCHLORIDE 25 MG/ML
6.25 INJECTION, SOLUTION INTRAMUSCULAR; INTRAVENOUS
Status: DISCONTINUED | OUTPATIENT
Start: 2020-01-01 | End: 2020-01-01 | Stop reason: HOSPADM

## 2020-01-01 RX ORDER — PROMETHAZINE HYDROCHLORIDE 25 MG/1
12.5 TABLET ORAL EVERY 6 HOURS PRN
Status: DISCONTINUED | OUTPATIENT
Start: 2020-01-01 | End: 2020-01-01 | Stop reason: HOSPADM

## 2020-01-01 RX ORDER — POLYETHYLENE GLYCOL 3350 17 G/17G
17 POWDER, FOR SOLUTION ORAL DAILY PRN
Status: DISCONTINUED | OUTPATIENT
Start: 2020-01-01 | End: 2020-01-01 | Stop reason: HOSPADM

## 2020-01-01 RX ORDER — ONDANSETRON 2 MG/ML
4 INJECTION INTRAMUSCULAR; INTRAVENOUS ONCE
Status: COMPLETED | OUTPATIENT
Start: 2020-01-01 | End: 2020-01-01

## 2020-01-01 RX ORDER — DEXTROSE MONOHYDRATE 50 MG/ML
INJECTION, SOLUTION INTRAVENOUS
Status: DISCONTINUED
Start: 2020-01-01 | End: 2020-01-01 | Stop reason: HOSPADM

## 2020-01-01 RX ORDER — DIPHENHYDRAMINE HYDROCHLORIDE 50 MG/ML
12.5 INJECTION INTRAMUSCULAR; INTRAVENOUS
Status: DISCONTINUED | OUTPATIENT
Start: 2020-01-01 | End: 2020-01-01 | Stop reason: HOSPADM

## 2020-01-01 RX ORDER — 0.9 % SODIUM CHLORIDE 0.9 %
500 INTRAVENOUS SOLUTION INTRAVENOUS ONCE
Status: COMPLETED | OUTPATIENT
Start: 2020-01-01 | End: 2020-01-01

## 2020-01-01 RX ORDER — LEVOFLOXACIN 250 MG/1
250 TABLET ORAL DAILY
Qty: 3 TABLET | Refills: 0 | Status: SHIPPED | OUTPATIENT
Start: 2020-01-01 | End: 2020-01-01

## 2020-01-01 RX ORDER — DEXTROSE MONOHYDRATE 25 G/50ML
12.5 INJECTION, SOLUTION INTRAVENOUS PRN
Status: DISCONTINUED | OUTPATIENT
Start: 2020-01-01 | End: 2020-01-01 | Stop reason: HOSPADM

## 2020-01-01 RX ORDER — ALBUTEROL SULFATE 2.5 MG/3ML
2.5 SOLUTION RESPIRATORY (INHALATION) EVERY 4 HOURS PRN
Status: DISCONTINUED | OUTPATIENT
Start: 2020-01-01 | End: 2020-01-01 | Stop reason: HOSPADM

## 2020-01-01 RX ORDER — LIDOCAINE HYDROCHLORIDE 20 MG/ML
INJECTION, SOLUTION EPIDURAL; INFILTRATION; INTRACAUDAL; PERINEURAL PRN
Status: DISCONTINUED | OUTPATIENT
Start: 2020-01-01 | End: 2020-01-01 | Stop reason: SDUPTHER

## 2020-01-01 RX ORDER — IPRATROPIUM BROMIDE AND ALBUTEROL SULFATE 2.5; .5 MG/3ML; MG/3ML
1 SOLUTION RESPIRATORY (INHALATION) ONCE
Status: COMPLETED | OUTPATIENT
Start: 2020-01-01 | End: 2020-01-01

## 2020-01-01 RX ORDER — LEVOFLOXACIN 250 MG/1
250 TABLET ORAL DAILY
Qty: 3 TABLET | Refills: 0
Start: 2020-01-01 | End: 2020-01-01 | Stop reason: SDUPTHER

## 2020-01-01 RX ORDER — LACTOBACILLUS RHAMNOSUS GG 10B CELL
1 CAPSULE ORAL 2 TIMES DAILY
Qty: 30 CAPSULE | Refills: 0 | Status: SHIPPED | OUTPATIENT
Start: 2020-01-01 | End: 2020-01-01

## 2020-01-01 RX ORDER — FUROSEMIDE 10 MG/ML
20 INJECTION INTRAMUSCULAR; INTRAVENOUS ONCE
Status: COMPLETED | OUTPATIENT
Start: 2020-01-01 | End: 2020-01-01

## 2020-01-01 RX ORDER — TRIAMTERENE AND HYDROCHLOROTHIAZIDE 37.5; 25 MG/1; MG/1
CAPSULE ORAL
Qty: 90 CAPSULE | Refills: 0 | Status: ON HOLD | OUTPATIENT
Start: 2020-01-01 | End: 2020-01-01 | Stop reason: HOSPADM

## 2020-01-01 RX ORDER — ISOSORBIDE DINITRATE 10 MG/1
TABLET ORAL
Qty: 90 TABLET | Refills: 0 | Status: SHIPPED | OUTPATIENT
Start: 2020-01-01

## 2020-01-01 RX ORDER — HEPARIN SODIUM 5000 [USP'U]/ML
5000 INJECTION, SOLUTION INTRAVENOUS; SUBCUTANEOUS EVERY 8 HOURS SCHEDULED
Status: DISCONTINUED | OUTPATIENT
Start: 2020-01-01 | End: 2020-01-01 | Stop reason: HOSPADM

## 2020-01-01 RX ORDER — MORPHINE SULFATE 10 MG/ML
2 INJECTION, SOLUTION INTRAMUSCULAR; INTRAVENOUS EVERY 5 MIN PRN
Status: DISCONTINUED | OUTPATIENT
Start: 2020-01-01 | End: 2020-01-01 | Stop reason: HOSPADM

## 2020-01-01 RX ORDER — MORPHINE SULFATE 4 MG/ML
INJECTION, SOLUTION INTRAMUSCULAR; INTRAVENOUS
Status: DISPENSED
Start: 2020-01-01 | End: 2020-01-01

## 2020-01-01 RX ORDER — ROCURONIUM BROMIDE 10 MG/ML
INJECTION, SOLUTION INTRAVENOUS PRN
Status: DISCONTINUED | OUTPATIENT
Start: 2020-01-01 | End: 2020-01-01 | Stop reason: SDUPTHER

## 2020-01-01 RX ORDER — OXYCODONE HYDROCHLORIDE 5 MG/1
10 TABLET ORAL EVERY 4 HOURS PRN
Status: DISCONTINUED | OUTPATIENT
Start: 2020-01-01 | End: 2020-01-01 | Stop reason: HOSPADM

## 2020-01-01 RX ORDER — PROPOFOL 10 MG/ML
INJECTION, EMULSION INTRAVENOUS PRN
Status: DISCONTINUED | OUTPATIENT
Start: 2020-01-01 | End: 2020-01-01 | Stop reason: SDUPTHER

## 2020-01-01 RX ORDER — IPRATROPIUM BROMIDE AND ALBUTEROL SULFATE 2.5; .5 MG/3ML; MG/3ML
1 SOLUTION RESPIRATORY (INHALATION) 3 TIMES DAILY
Status: DISCONTINUED | OUTPATIENT
Start: 2020-01-01 | End: 2020-01-01

## 2020-01-01 RX ORDER — ALBUTEROL SULFATE 90 UG/1
2 AEROSOL, METERED RESPIRATORY (INHALATION) EVERY 4 HOURS PRN
Status: DISCONTINUED | OUTPATIENT
Start: 2020-01-01 | End: 2020-01-01 | Stop reason: HOSPADM

## 2020-01-01 RX ORDER — ISOSORBIDE DINITRATE 10 MG/1
TABLET ORAL
Qty: 90 TABLET | Refills: 0 | Status: SHIPPED | OUTPATIENT
Start: 2020-01-01 | End: 2020-01-01 | Stop reason: SDUPTHER

## 2020-01-01 RX ORDER — ATROPINE SULFATE 0.1 MG/ML
INJECTION INTRAVENOUS DAILY PRN
Status: COMPLETED | OUTPATIENT
Start: 2020-01-01 | End: 2020-01-01

## 2020-01-01 RX ORDER — HYDROMORPHONE HCL 110MG/55ML
PATIENT CONTROLLED ANALGESIA SYRINGE INTRAVENOUS PRN
Status: DISCONTINUED | OUTPATIENT
Start: 2020-01-01 | End: 2020-01-01 | Stop reason: SDUPTHER

## 2020-01-01 RX ORDER — GLIMEPIRIDE 2 MG/1
TABLET ORAL
Qty: 180 TABLET | Refills: 0 | Status: ON HOLD | OUTPATIENT
Start: 2020-01-01 | End: 2020-01-01 | Stop reason: HOSPADM

## 2020-01-01 RX ORDER — SODIUM CHLORIDE 9 MG/ML
INJECTION, SOLUTION INTRAVENOUS CONTINUOUS
Status: DISCONTINUED | OUTPATIENT
Start: 2020-01-01 | End: 2020-01-01 | Stop reason: HOSPADM

## 2020-01-01 RX ORDER — ALBUTEROL SULFATE 90 UG/1
2 AEROSOL, METERED RESPIRATORY (INHALATION) EVERY 4 HOURS PRN
Qty: 1 INHALER | Refills: 3 | Status: SHIPPED | OUTPATIENT
Start: 2020-01-01

## 2020-01-01 RX ORDER — MIDAZOLAM HYDROCHLORIDE 5 MG/ML
INJECTION INTRAMUSCULAR; INTRAVENOUS DAILY PRN
Status: COMPLETED | OUTPATIENT
Start: 2020-01-01 | End: 2020-01-01

## 2020-01-01 RX ORDER — SODIUM CHLORIDE, SODIUM LACTATE, POTASSIUM CHLORIDE, CALCIUM CHLORIDE 600; 310; 30; 20 MG/100ML; MG/100ML; MG/100ML; MG/100ML
INJECTION, SOLUTION INTRAVENOUS CONTINUOUS
Status: DISCONTINUED | OUTPATIENT
Start: 2020-01-01 | End: 2020-01-01

## 2020-01-01 RX ORDER — DIPHENHYDRAMINE HYDROCHLORIDE 50 MG/ML
12.5 INJECTION INTRAMUSCULAR; INTRAVENOUS EVERY 6 HOURS PRN
Status: DISCONTINUED | OUTPATIENT
Start: 2020-01-01 | End: 2020-01-01 | Stop reason: HOSPADM

## 2020-01-01 RX ORDER — FERROUS SULFATE 325(65) MG
325 TABLET ORAL EVERY OTHER DAY
Status: DISCONTINUED | OUTPATIENT
Start: 2020-01-01 | End: 2020-01-01 | Stop reason: HOSPADM

## 2020-01-01 RX ORDER — ASPIRIN 81 MG/1
324 TABLET, CHEWABLE ORAL ONCE
Status: COMPLETED | OUTPATIENT
Start: 2020-01-01 | End: 2020-01-01

## 2020-01-01 RX ORDER — FERROUS SULFATE 325(65) MG
325 TABLET ORAL EVERY OTHER DAY
Status: ON HOLD | COMMUNITY
End: 2020-01-01 | Stop reason: HOSPADM

## 2020-01-01 RX ORDER — ONDANSETRON 2 MG/ML
INJECTION INTRAMUSCULAR; INTRAVENOUS PRN
Status: DISCONTINUED | OUTPATIENT
Start: 2020-01-01 | End: 2020-01-01 | Stop reason: SDUPTHER

## 2020-01-01 RX ORDER — ONDANSETRON 2 MG/ML
4 INJECTION INTRAMUSCULAR; INTRAVENOUS EVERY 6 HOURS PRN
Status: DISCONTINUED | OUTPATIENT
Start: 2020-01-01 | End: 2020-01-01 | Stop reason: HOSPADM

## 2020-01-01 RX ORDER — MORPHINE SULFATE 2 MG/ML
2 INJECTION, SOLUTION INTRAMUSCULAR; INTRAVENOUS EVERY 4 HOURS PRN
Status: DISCONTINUED | OUTPATIENT
Start: 2020-01-01 | End: 2020-01-01 | Stop reason: HOSPADM

## 2020-01-01 RX ORDER — DEXAMETHASONE SODIUM PHOSPHATE 4 MG/ML
INJECTION, SOLUTION INTRA-ARTICULAR; INTRALESIONAL; INTRAMUSCULAR; INTRAVENOUS; SOFT TISSUE PRN
Status: DISCONTINUED | OUTPATIENT
Start: 2020-01-01 | End: 2020-01-01 | Stop reason: SDUPTHER

## 2020-01-01 RX ORDER — CIPROFLOXACIN 250 MG/1
250 TABLET, FILM COATED ORAL 2 TIMES DAILY
Qty: 12 TABLET | Refills: 0 | Status: SHIPPED | OUTPATIENT
Start: 2020-01-01 | End: 2020-01-01

## 2020-01-01 RX ORDER — DEXTROSE AND SODIUM CHLORIDE 5; .9 G/100ML; G/100ML
INJECTION, SOLUTION INTRAVENOUS CONTINUOUS
Status: DISCONTINUED | OUTPATIENT
Start: 2020-01-01 | End: 2020-01-01

## 2020-01-01 RX ORDER — IPRATROPIUM BROMIDE AND ALBUTEROL SULFATE 2.5; .5 MG/3ML; MG/3ML
1 SOLUTION RESPIRATORY (INHALATION) 2 TIMES DAILY
Status: DISCONTINUED | OUTPATIENT
Start: 2020-01-01 | End: 2020-01-01 | Stop reason: HOSPADM

## 2020-01-01 RX ORDER — PROCHLORPERAZINE EDISYLATE 5 MG/ML
10 INJECTION INTRAMUSCULAR; INTRAVENOUS EVERY 6 HOURS PRN
Status: DISCONTINUED | OUTPATIENT
Start: 2020-01-01 | End: 2020-01-01 | Stop reason: HOSPADM

## 2020-01-01 RX ORDER — BISACODYL 10 MG
10 SUPPOSITORY, RECTAL RECTAL DAILY PRN
Status: DISCONTINUED | OUTPATIENT
Start: 2020-01-01 | End: 2020-01-01 | Stop reason: HOSPADM

## 2020-01-01 RX ORDER — EPINEPHRINE 0.1 MG/ML
SYRINGE (ML) INJECTION DAILY PRN
Status: COMPLETED | OUTPATIENT
Start: 2020-01-01 | End: 2020-01-01

## 2020-01-01 RX ADMIN — ISOSORBIDE DINITRATE 10 MG: 10 TABLET ORAL at 08:26

## 2020-01-01 RX ADMIN — ENOXAPARIN SODIUM 70 MG: 80 INJECTION SUBCUTANEOUS at 08:25

## 2020-01-01 RX ADMIN — HYDROMORPHONE HYDROCHLORIDE 0.5 MG: 1 INJECTION, SOLUTION INTRAMUSCULAR; INTRAVENOUS; SUBCUTANEOUS at 23:41

## 2020-01-01 RX ADMIN — ROPINIROLE HYDROCHLORIDE 1 MG: 1 TABLET, FILM COATED ORAL at 21:09

## 2020-01-01 RX ADMIN — FERROUS SULFATE TAB 325 MG (65 MG ELEMENTAL FE) 325 MG: 325 (65 FE) TAB at 09:39

## 2020-01-01 RX ADMIN — IPRATROPIUM BROMIDE AND ALBUTEROL SULFATE 1 AMPULE: .5; 3 SOLUTION RESPIRATORY (INHALATION) at 06:59

## 2020-01-01 RX ADMIN — IPRATROPIUM BROMIDE AND ALBUTEROL SULFATE 1 AMPULE: .5; 3 SOLUTION RESPIRATORY (INHALATION) at 07:54

## 2020-01-01 RX ADMIN — IPRATROPIUM BROMIDE AND ALBUTEROL SULFATE 1 AMPULE: .5; 3 SOLUTION RESPIRATORY (INHALATION) at 19:57

## 2020-01-01 RX ADMIN — ISOSORBIDE DINITRATE 10 MG: 10 TABLET ORAL at 20:23

## 2020-01-01 RX ADMIN — SODIUM CHLORIDE 500 ML: 9 INJECTION, SOLUTION INTRAVENOUS at 01:10

## 2020-01-01 RX ADMIN — NOREPINEPHRINE BITARTRATE 5 MCG/MIN: 1 INJECTION INTRAVENOUS at 14:45

## 2020-01-01 RX ADMIN — SODIUM CHLORIDE 250 ML: 9 INJECTION, SOLUTION INTRAVENOUS at 06:33

## 2020-01-01 RX ADMIN — IPRATROPIUM BROMIDE AND ALBUTEROL SULFATE 1 AMPULE: .5; 3 SOLUTION RESPIRATORY (INHALATION) at 07:05

## 2020-01-01 RX ADMIN — ENOXAPARIN SODIUM 40 MG: 100 INJECTION SUBCUTANEOUS at 09:34

## 2020-01-01 RX ADMIN — ISOSORBIDE DINITRATE 10 MG: 10 TABLET ORAL at 09:39

## 2020-01-01 RX ADMIN — SODIUM CHLORIDE: 9 INJECTION, SOLUTION INTRAVENOUS at 20:14

## 2020-01-01 RX ADMIN — IPRATROPIUM BROMIDE AND ALBUTEROL SULFATE 1 AMPULE: .5; 3 SOLUTION RESPIRATORY (INHALATION) at 15:18

## 2020-01-01 RX ADMIN — POLYETHYLENE GLYCOL (3350) 17 G: 17 POWDER, FOR SOLUTION ORAL at 09:28

## 2020-01-01 RX ADMIN — ENOXAPARIN SODIUM 70 MG: 80 INJECTION SUBCUTANEOUS at 08:13

## 2020-01-01 RX ADMIN — IPRATROPIUM BROMIDE AND ALBUTEROL SULFATE 1 AMPULE: .5; 3 SOLUTION RESPIRATORY (INHALATION) at 07:15

## 2020-01-01 RX ADMIN — SODIUM CHLORIDE: 9 INJECTION, SOLUTION INTRAVENOUS at 05:20

## 2020-01-01 RX ADMIN — ASPIRIN 81 MG: 81 TABLET, CHEWABLE ORAL at 08:26

## 2020-01-01 RX ADMIN — SODIUM CHLORIDE: 9 INJECTION, SOLUTION INTRAVENOUS at 20:51

## 2020-01-01 RX ADMIN — SODIUM CHLORIDE: 9 INJECTION, SOLUTION INTRAVENOUS at 19:33

## 2020-01-01 RX ADMIN — DEXAMETHASONE SODIUM PHOSPHATE 4 MG: 4 INJECTION, SOLUTION INTRAMUSCULAR; INTRAVENOUS at 08:22

## 2020-01-01 RX ADMIN — ISOSORBIDE DINITRATE 10 MG: 10 TABLET ORAL at 10:19

## 2020-01-01 RX ADMIN — Medication 10 ML: at 09:33

## 2020-01-01 RX ADMIN — BISACODYL 10 MG: 10 SUPPOSITORY RECTAL at 11:38

## 2020-01-01 RX ADMIN — LIDOCAINE HYDROCHLORIDE 50 MG: 20 INJECTION, SOLUTION EPIDURAL; INFILTRATION; INTRACAUDAL; PERINEURAL at 07:40

## 2020-01-01 RX ADMIN — SODIUM CHLORIDE: 9 INJECTION, SOLUTION INTRAVENOUS at 12:08

## 2020-01-01 RX ADMIN — HEPARIN SODIUM 5000 UNITS: 5000 INJECTION INTRAVENOUS; SUBCUTANEOUS at 20:35

## 2020-01-01 RX ADMIN — BISACODYL 20 MG: 5 TABLET, COATED ORAL at 16:53

## 2020-01-01 RX ADMIN — ENOXAPARIN SODIUM 70 MG: 80 INJECTION SUBCUTANEOUS at 20:49

## 2020-01-01 RX ADMIN — ISOSORBIDE DINITRATE 10 MG: 10 TABLET ORAL at 20:13

## 2020-01-01 RX ADMIN — IOPAMIDOL 75 ML: 755 INJECTION, SOLUTION INTRAVENOUS at 15:01

## 2020-01-01 RX ADMIN — IPRATROPIUM BROMIDE AND ALBUTEROL SULFATE 1 AMPULE: .5; 3 SOLUTION RESPIRATORY (INHALATION) at 19:36

## 2020-01-01 RX ADMIN — INFLUENZA A VIRUS A/BRISBANE/02/2018 IVR-190 (H1N1) ANTIGEN (PROPIOLACTONE INACTIVATED), INFLUENZA A VIRUS A/KANSAS/14/2017 X-327 (H3N2) ANTIGEN (PROPIOLACTONE INACTIVATED), INFLUENZA B VIRUS B/MARYLAND/15/2016 ANTIGEN (PROPIOLACTONE INACTIVATED), INFLUENZA B VIRUS B/PHUKET/3073/2013 BVR-1B ANTIGEN (PROPIOLACTONE INACTIVATED) 0.5 ML: 15; 15; 15; 15 INJECTION, SUSPENSION INTRAMUSCULAR at 17:49

## 2020-01-01 RX ADMIN — INSULIN LISPRO 2 UNITS: 100 INJECTION, SOLUTION INTRAVENOUS; SUBCUTANEOUS at 12:13

## 2020-01-01 RX ADMIN — ISOSORBIDE DINITRATE 10 MG: 10 TABLET ORAL at 12:12

## 2020-01-01 RX ADMIN — CEFTRIAXONE SODIUM 1 G: 1 INJECTION, POWDER, FOR SOLUTION INTRAMUSCULAR; INTRAVENOUS at 20:50

## 2020-01-01 RX ADMIN — FAMOTIDINE 20 MG: 10 INJECTION, SOLUTION INTRAVENOUS at 07:01

## 2020-01-01 RX ADMIN — HEPARIN SODIUM 5000 UNITS: 5000 INJECTION INTRAVENOUS; SUBCUTANEOUS at 05:30

## 2020-01-01 RX ADMIN — OXYCODONE HYDROCHLORIDE 5 MG: 5 TABLET ORAL at 12:00

## 2020-01-01 RX ADMIN — DEXTROSE MONOHYDRATE 25 G: 25 INJECTION, SOLUTION INTRAVENOUS at 20:59

## 2020-01-01 RX ADMIN — METRONIDAZOLE 500 MG: 500 INJECTION, SOLUTION INTRAVENOUS at 07:39

## 2020-01-01 RX ADMIN — ASPIRIN 81 MG: 81 TABLET, CHEWABLE ORAL at 10:19

## 2020-01-01 RX ADMIN — MIDAZOLAM HYDROCHLORIDE 5 MG: 5 INJECTION, SOLUTION INTRAMUSCULAR; INTRAVENOUS at 13:51

## 2020-01-01 RX ADMIN — ISOSORBIDE DINITRATE 10 MG: 10 TABLET ORAL at 09:12

## 2020-01-01 RX ADMIN — ENOXAPARIN SODIUM 70 MG: 80 INJECTION SUBCUTANEOUS at 20:57

## 2020-01-01 RX ADMIN — Medication 10 ML: at 10:44

## 2020-01-01 RX ADMIN — WATER 1 G: 1 INJECTION INTRAMUSCULAR; INTRAVENOUS; SUBCUTANEOUS at 02:36

## 2020-01-01 RX ADMIN — ENOXAPARIN SODIUM 40 MG: 40 INJECTION SUBCUTANEOUS at 11:07

## 2020-01-01 RX ADMIN — Medication 10 ML: at 20:49

## 2020-01-01 RX ADMIN — MICONAZOLE NITRATE: 20 POWDER TOPICAL at 09:19

## 2020-01-01 RX ADMIN — SUCCINYLCHOLINE CHLORIDE 100 MG: 20 INJECTION, SOLUTION INTRAMUSCULAR; INTRAVENOUS at 13:51

## 2020-01-01 RX ADMIN — ISOSORBIDE DINITRATE 10 MG: 10 TABLET ORAL at 13:28

## 2020-01-01 RX ADMIN — PROPOFOL 80 MG: 10 INJECTION, EMULSION INTRAVENOUS at 07:40

## 2020-01-01 RX ADMIN — ONDANSETRON 4 MG: 2 INJECTION, SOLUTION INTRAMUSCULAR; INTRAVENOUS at 08:22

## 2020-01-01 RX ADMIN — ENOXAPARIN SODIUM 40 MG: 40 INJECTION SUBCUTANEOUS at 09:12

## 2020-01-01 RX ADMIN — Medication 10 ML: at 21:18

## 2020-01-01 RX ADMIN — ROCURONIUM BROMIDE 35 MG: 10 INJECTION, SOLUTION INTRAVENOUS at 07:40

## 2020-01-01 RX ADMIN — Medication 10 ML: at 08:07

## 2020-01-01 RX ADMIN — MICONAZOLE NITRATE: 20 POWDER TOPICAL at 11:42

## 2020-01-01 RX ADMIN — HYDROMORPHONE HYDROCHLORIDE 0.5 MG: 1 INJECTION, SOLUTION INTRAMUSCULAR; INTRAVENOUS; SUBCUTANEOUS at 21:08

## 2020-01-01 RX ADMIN — Medication 10 ML: at 10:20

## 2020-01-01 RX ADMIN — ISOSORBIDE DINITRATE 10 MG: 10 TABLET ORAL at 08:19

## 2020-01-01 RX ADMIN — Medication 2 G: at 23:37

## 2020-01-01 RX ADMIN — EPINEPHRINE 3 MCG: 0.1 INJECTION, SOLUTION ENDOTRACHEAL; INTRACARDIAC; INTRAVENOUS at 14:30

## 2020-01-01 RX ADMIN — HYDROMORPHONE HYDROCHLORIDE 0.4 MG: 2 INJECTION, SOLUTION INTRAMUSCULAR; INTRAVENOUS; SUBCUTANEOUS at 07:40

## 2020-01-01 RX ADMIN — ISOSORBIDE DINITRATE 10 MG: 10 TABLET ORAL at 17:05

## 2020-01-01 RX ADMIN — SODIUM CHLORIDE 500 ML: 9 INJECTION, SOLUTION INTRAVENOUS at 22:52

## 2020-01-01 RX ADMIN — MICONAZOLE NITRATE: 20 POWDER TOPICAL at 20:24

## 2020-01-01 RX ADMIN — SODIUM CHLORIDE: 9 INJECTION, SOLUTION INTRAVENOUS at 06:17

## 2020-01-01 RX ADMIN — CEFTRIAXONE 1 G: 1 INJECTION, POWDER, FOR SOLUTION INTRAMUSCULAR; INTRAVENOUS at 02:30

## 2020-01-01 RX ADMIN — ISOSORBIDE DINITRATE 10 MG: 10 TABLET ORAL at 15:55

## 2020-01-01 RX ADMIN — ISOSORBIDE DINITRATE 10 MG: 10 TABLET ORAL at 09:11

## 2020-01-01 RX ADMIN — SODIUM CHLORIDE: 9 INJECTION, SOLUTION INTRAVENOUS at 17:32

## 2020-01-01 RX ADMIN — IPRATROPIUM BROMIDE AND ALBUTEROL SULFATE 1 AMPULE: .5; 3 SOLUTION RESPIRATORY (INHALATION) at 07:38

## 2020-01-01 RX ADMIN — ROPINIROLE HYDROCHLORIDE 1 MG: 1 TABLET, FILM COATED ORAL at 21:16

## 2020-01-01 RX ADMIN — HYDROMORPHONE HYDROCHLORIDE 0.4 MG: 2 INJECTION, SOLUTION INTRAMUSCULAR; INTRAVENOUS; SUBCUTANEOUS at 08:31

## 2020-01-01 RX ADMIN — ISOSORBIDE DINITRATE 10 MG: 10 TABLET ORAL at 16:16

## 2020-01-01 RX ADMIN — SODIUM CHLORIDE 1000 ML: 9 INJECTION, SOLUTION INTRAVENOUS at 20:00

## 2020-01-01 RX ADMIN — HEPARIN SODIUM 5000 UNITS: 5000 INJECTION INTRAVENOUS; SUBCUTANEOUS at 20:51

## 2020-01-01 RX ADMIN — Medication 10 ML: at 20:24

## 2020-01-01 RX ADMIN — POTASSIUM & SODIUM PHOSPHATES POWDER PACK 280-160-250 MG 500 MG: 280-160-250 PACK at 09:52

## 2020-01-01 RX ADMIN — METRONIDAZOLE 500 MG: 500 INJECTION, SOLUTION INTRAVENOUS at 23:42

## 2020-01-01 RX ADMIN — ASPIRIN 81 MG 81 MG: 81 TABLET ORAL at 09:25

## 2020-01-01 RX ADMIN — ONDANSETRON HYDROCHLORIDE 4 MG: 2 INJECTION, SOLUTION INTRAMUSCULAR; INTRAVENOUS at 01:10

## 2020-01-01 RX ADMIN — ENOXAPARIN SODIUM 70 MG: 80 INJECTION SUBCUTANEOUS at 09:33

## 2020-01-01 RX ADMIN — DEXTROSE AND SODIUM CHLORIDE: 5; 900 INJECTION, SOLUTION INTRAVENOUS at 23:09

## 2020-01-01 RX ADMIN — Medication 10 ML: at 20:04

## 2020-01-01 RX ADMIN — HEPARIN SODIUM 5000 UNITS: 5000 INJECTION INTRAVENOUS; SUBCUTANEOUS at 12:28

## 2020-01-01 RX ADMIN — INSULIN LISPRO 1 UNITS: 100 INJECTION, SOLUTION INTRAVENOUS; SUBCUTANEOUS at 12:13

## 2020-01-01 RX ADMIN — POLYETHYLENE GLYCOL (3350) 17 G: 17 POWDER, FOR SOLUTION ORAL at 09:12

## 2020-01-01 RX ADMIN — MORPHINE SULFATE 2 MG: 2 INJECTION, SOLUTION INTRAMUSCULAR; INTRAVENOUS at 20:13

## 2020-01-01 RX ADMIN — ROPINIROLE HYDROCHLORIDE 1 MG: 1 TABLET, FILM COATED ORAL at 21:23

## 2020-01-01 RX ADMIN — IPRATROPIUM BROMIDE AND ALBUTEROL SULFATE 1 AMPULE: .5; 3 SOLUTION RESPIRATORY (INHALATION) at 13:24

## 2020-01-01 RX ADMIN — ROPINIROLE HYDROCHLORIDE 0.5 MG: 0.25 TABLET, FILM COATED ORAL at 20:35

## 2020-01-01 RX ADMIN — HEPARIN SODIUM 5000 UNITS: 5000 INJECTION INTRAVENOUS; SUBCUTANEOUS at 15:21

## 2020-01-01 RX ADMIN — ISOSORBIDE DINITRATE 10 MG: 10 TABLET ORAL at 21:09

## 2020-01-01 RX ADMIN — SODIUM CHLORIDE 1000 ML: 9 INJECTION, SOLUTION INTRAVENOUS at 20:50

## 2020-01-01 RX ADMIN — CEFTRIAXONE SODIUM 1 G: 1 INJECTION, POWDER, FOR SOLUTION INTRAMUSCULAR; INTRAVENOUS at 19:58

## 2020-01-01 RX ADMIN — ISOSORBIDE DINITRATE 10 MG: 10 TABLET ORAL at 15:24

## 2020-01-01 RX ADMIN — DEXTROSE AND SODIUM CHLORIDE: 5; 450 INJECTION, SOLUTION INTRAVENOUS at 05:52

## 2020-01-01 RX ADMIN — SODIUM BICARBONATE 50 MEQ: 84 INJECTION, SOLUTION INTRAVENOUS at 15:30

## 2020-01-01 RX ADMIN — HEPARIN SODIUM 5000 UNITS: 5000 INJECTION INTRAVENOUS; SUBCUTANEOUS at 21:08

## 2020-01-01 RX ADMIN — ENOXAPARIN SODIUM 70 MG: 80 INJECTION SUBCUTANEOUS at 21:15

## 2020-01-01 RX ADMIN — HEPARIN SODIUM 5000 UNITS: 5000 INJECTION INTRAVENOUS; SUBCUTANEOUS at 16:16

## 2020-01-01 RX ADMIN — ASPIRIN 81 MG: 81 TABLET, CHEWABLE ORAL at 08:13

## 2020-01-01 RX ADMIN — SODIUM CHLORIDE 1000 ML: 9 INJECTION, SOLUTION INTRAVENOUS at 13:42

## 2020-01-01 RX ADMIN — FUROSEMIDE 20 MG: 10 INJECTION, SOLUTION INTRAVENOUS at 21:16

## 2020-01-01 RX ADMIN — IPRATROPIUM BROMIDE AND ALBUTEROL SULFATE 1 AMPULE: .5; 3 SOLUTION RESPIRATORY (INHALATION) at 15:45

## 2020-01-01 RX ADMIN — INSULIN LISPRO 1 UNITS: 100 INJECTION, SOLUTION INTRAVENOUS; SUBCUTANEOUS at 17:06

## 2020-01-01 RX ADMIN — MICONAZOLE NITRATE: 20 POWDER TOPICAL at 20:52

## 2020-01-01 RX ADMIN — POTASSIUM & SODIUM PHOSPHATES POWDER PACK 280-160-250 MG 500 MG: 280-160-250 PACK at 15:25

## 2020-01-01 RX ADMIN — Medication 10 ML: at 21:24

## 2020-01-01 RX ADMIN — IPRATROPIUM BROMIDE AND ALBUTEROL SULFATE 1 AMPULE: .5; 3 SOLUTION RESPIRATORY (INHALATION) at 20:16

## 2020-01-01 RX ADMIN — POTASSIUM & SODIUM PHOSPHATES POWDER PACK 280-160-250 MG 500 MG: 280-160-250 PACK at 20:24

## 2020-01-01 RX ADMIN — HEPARIN SODIUM 5000 UNITS: 5000 INJECTION INTRAVENOUS; SUBCUTANEOUS at 20:21

## 2020-01-01 RX ADMIN — ENOXAPARIN SODIUM 70 MG: 80 INJECTION SUBCUTANEOUS at 10:19

## 2020-01-01 RX ADMIN — ENOXAPARIN SODIUM 70 MG: 80 INJECTION SUBCUTANEOUS at 13:13

## 2020-01-01 RX ADMIN — ENOXAPARIN SODIUM 40 MG: 40 INJECTION SUBCUTANEOUS at 11:41

## 2020-01-01 RX ADMIN — PROPOFOL 150 MG: 10 INJECTION, EMULSION INTRAVENOUS at 11:55

## 2020-01-01 RX ADMIN — ISOSORBIDE DINITRATE 10 MG: 10 TABLET ORAL at 14:31

## 2020-01-01 RX ADMIN — HYDROMORPHONE HYDROCHLORIDE 0.5 MG: 1 INJECTION, SOLUTION INTRAMUSCULAR; INTRAVENOUS; SUBCUTANEOUS at 04:15

## 2020-01-01 RX ADMIN — ISOSORBIDE DINITRATE 10 MG: 10 TABLET ORAL at 12:28

## 2020-01-01 RX ADMIN — HEPARIN SODIUM 5000 UNITS: 5000 INJECTION INTRAVENOUS; SUBCUTANEOUS at 05:58

## 2020-01-01 RX ADMIN — ROPINIROLE HYDROCHLORIDE 1 MG: 1 TABLET, FILM COATED ORAL at 20:00

## 2020-01-01 RX ADMIN — SODIUM CHLORIDE: 9 INJECTION, SOLUTION INTRAVENOUS at 17:03

## 2020-01-01 RX ADMIN — DEXTROSE MONOHYDRATE 500 MG: 50 INJECTION, SOLUTION INTRAVENOUS at 23:12

## 2020-01-01 RX ADMIN — EPINEPHRINE 1 MG: 0.1 INJECTION, SOLUTION ENDOTRACHEAL; INTRACARDIAC; INTRAVENOUS at 15:29

## 2020-01-01 RX ADMIN — AZITHROMYCIN MONOHYDRATE 500 MG: 500 INJECTION, POWDER, LYOPHILIZED, FOR SOLUTION INTRAVENOUS at 22:38

## 2020-01-01 RX ADMIN — SODIUM CHLORIDE: 9 INJECTION, SOLUTION INTRAVENOUS at 03:18

## 2020-01-01 RX ADMIN — BUPIVACAINE HYDROCHLORIDE 270 ML: 5 INJECTION, SOLUTION EPIDURAL; INTRACAUDAL at 09:29

## 2020-01-01 RX ADMIN — CEFTRIAXONE SODIUM 1 G: 1 INJECTION, POWDER, FOR SOLUTION INTRAMUSCULAR; INTRAVENOUS at 20:57

## 2020-01-01 RX ADMIN — SUGAMMADEX 200 MG: 100 INJECTION, SOLUTION INTRAVENOUS at 08:34

## 2020-01-01 RX ADMIN — MORPHINE SULFATE 2 MG: 2 INJECTION, SOLUTION INTRAMUSCULAR; INTRAVENOUS at 05:45

## 2020-01-01 RX ADMIN — ISOSORBIDE DINITRATE 10 MG: 10 TABLET ORAL at 19:06

## 2020-01-01 RX ADMIN — ISOSORBIDE DINITRATE 10 MG: 10 TABLET ORAL at 10:10

## 2020-01-01 RX ADMIN — IPRATROPIUM BROMIDE AND ALBUTEROL SULFATE 1 AMPULE: .5; 3 SOLUTION RESPIRATORY (INHALATION) at 23:52

## 2020-01-01 RX ADMIN — SODIUM CHLORIDE: 9 INJECTION, SOLUTION INTRAVENOUS at 21:12

## 2020-01-01 RX ADMIN — ISOSORBIDE DINITRATE 10 MG: 10 TABLET ORAL at 19:04

## 2020-01-01 RX ADMIN — SODIUM CHLORIDE: 9 INJECTION, SOLUTION INTRAVENOUS at 10:44

## 2020-01-01 RX ADMIN — IPRATROPIUM BROMIDE AND ALBUTEROL SULFATE 1 AMPULE: .5; 3 SOLUTION RESPIRATORY (INHALATION) at 13:30

## 2020-01-01 RX ADMIN — CEFTRIAXONE SODIUM 1 G: 1 INJECTION, POWDER, FOR SOLUTION INTRAMUSCULAR; INTRAVENOUS at 23:02

## 2020-01-01 RX ADMIN — IPRATROPIUM BROMIDE AND ALBUTEROL SULFATE 1 AMPULE: .5; 3 SOLUTION RESPIRATORY (INHALATION) at 23:09

## 2020-01-01 RX ADMIN — INSULIN LISPRO 1 UNITS: 100 INJECTION, SOLUTION INTRAVENOUS; SUBCUTANEOUS at 12:03

## 2020-01-01 RX ADMIN — AZITHROMYCIN MONOHYDRATE 500 MG: 500 INJECTION, POWDER, LYOPHILIZED, FOR SOLUTION INTRAVENOUS at 21:53

## 2020-01-01 RX ADMIN — ROPINIROLE HYDROCHLORIDE 1 MG: 1 TABLET, FILM COATED ORAL at 20:50

## 2020-01-01 RX ADMIN — ASPIRIN 81 MG 81 MG: 81 TABLET ORAL at 09:12

## 2020-01-01 RX ADMIN — POLYETHYLENE GLYCOL 3350 119 G: 17 POWDER, FOR SOLUTION ORAL at 03:54

## 2020-01-01 RX ADMIN — CEFTRIAXONE SODIUM 1 G: 1 INJECTION, POWDER, FOR SOLUTION INTRAMUSCULAR; INTRAVENOUS at 06:33

## 2020-01-01 RX ADMIN — ASPIRIN 324 MG: 81 TABLET, CHEWABLE ORAL at 03:06

## 2020-01-01 RX ADMIN — HYDROMORPHONE HYDROCHLORIDE 0.5 MG: 1 INJECTION, SOLUTION INTRAMUSCULAR; INTRAVENOUS; SUBCUTANEOUS at 17:33

## 2020-01-01 RX ADMIN — Medication 2 G: at 07:39

## 2020-01-01 RX ADMIN — ISOSORBIDE DINITRATE 10 MG: 10 TABLET ORAL at 13:10

## 2020-01-01 RX ADMIN — METRONIDAZOLE 500 MG: 500 INJECTION, SOLUTION INTRAVENOUS at 15:20

## 2020-01-01 RX ADMIN — ISOSORBIDE DINITRATE 10 MG: 10 TABLET ORAL at 17:01

## 2020-01-01 RX ADMIN — ISOSORBIDE DINITRATE 10 MG: 10 TABLET ORAL at 08:53

## 2020-01-01 RX ADMIN — INSULIN LISPRO 1 UNITS: 100 INJECTION, SOLUTION INTRAVENOUS; SUBCUTANEOUS at 12:01

## 2020-01-01 RX ADMIN — ISOSORBIDE DINITRATE 10 MG: 10 TABLET ORAL at 20:35

## 2020-01-01 RX ADMIN — ENOXAPARIN SODIUM 40 MG: 100 INJECTION SUBCUTANEOUS at 09:25

## 2020-01-01 RX ADMIN — FERROUS SULFATE TAB 325 MG (65 MG ELEMENTAL FE) 325 MG: 325 (65 FE) TAB at 08:13

## 2020-01-01 RX ADMIN — AZITHROMYCIN MONOHYDRATE 500 MG: 500 INJECTION, POWDER, LYOPHILIZED, FOR SOLUTION INTRAVENOUS at 21:08

## 2020-01-01 RX ADMIN — HYDROMORPHONE HYDROCHLORIDE 0.4 MG: 2 INJECTION, SOLUTION INTRAMUSCULAR; INTRAVENOUS; SUBCUTANEOUS at 07:50

## 2020-01-01 RX ADMIN — Medication 10 ML: at 20:58

## 2020-01-01 RX ADMIN — SODIUM CHLORIDE, POTASSIUM CHLORIDE, SODIUM LACTATE AND CALCIUM CHLORIDE: 600; 310; 30; 20 INJECTION, SOLUTION INTRAVENOUS at 07:00

## 2020-01-01 RX ADMIN — IPRATROPIUM BROMIDE AND ALBUTEROL SULFATE 1 AMPULE: .5; 3 SOLUTION RESPIRATORY (INHALATION) at 20:48

## 2020-01-01 RX ADMIN — SODIUM CHLORIDE: 9 INJECTION, SOLUTION INTRAVENOUS at 20:10

## 2020-01-01 RX ADMIN — ISOSORBIDE DINITRATE 10 MG: 10 TABLET ORAL at 09:34

## 2020-01-01 RX ADMIN — HYDROMORPHONE HYDROCHLORIDE 0.5 MG: 1 INJECTION, SOLUTION INTRAMUSCULAR; INTRAVENOUS; SUBCUTANEOUS at 20:52

## 2020-01-01 RX ADMIN — FERROUS SULFATE TAB 325 MG (65 MG ELEMENTAL FE) 325 MG: 325 (65 FE) TAB at 13:10

## 2020-01-01 RX ADMIN — ISOSORBIDE DINITRATE 10 MG: 10 TABLET ORAL at 08:13

## 2020-01-01 RX ADMIN — IPRATROPIUM BROMIDE AND ALBUTEROL SULFATE 1 AMPULE: .5; 3 SOLUTION RESPIRATORY (INHALATION) at 08:22

## 2020-01-01 RX ADMIN — SODIUM CHLORIDE: 9 INJECTION, SOLUTION INTRAVENOUS at 21:14

## 2020-01-01 RX ADMIN — TRAMADOL HYDROCHLORIDE 50 MG: 50 TABLET, COATED ORAL at 21:24

## 2020-01-01 RX ADMIN — SODIUM CHLORIDE: 9 INJECTION, SOLUTION INTRAVENOUS at 09:50

## 2020-01-01 RX ADMIN — INSULIN LISPRO 1 UNITS: 100 INJECTION, SOLUTION INTRAVENOUS; SUBCUTANEOUS at 12:27

## 2020-01-01 RX ADMIN — HEPARIN SODIUM 5000 UNITS: 5000 INJECTION INTRAVENOUS; SUBCUTANEOUS at 05:21

## 2020-01-01 RX ADMIN — SODIUM CHLORIDE, POTASSIUM CHLORIDE, SODIUM LACTATE AND CALCIUM CHLORIDE: 600; 310; 30; 20 INJECTION, SOLUTION INTRAVENOUS at 11:53

## 2020-01-01 RX ADMIN — TRAMADOL HYDROCHLORIDE 50 MG: 50 TABLET, COATED ORAL at 09:35

## 2020-01-01 RX ADMIN — MICONAZOLE NITRATE: 20 POWDER TOPICAL at 09:29

## 2020-01-01 RX ADMIN — SODIUM CHLORIDE: 9 INJECTION, SOLUTION INTRAVENOUS at 05:30

## 2020-01-01 RX ADMIN — ISOSORBIDE DINITRATE 10 MG: 10 TABLET ORAL at 19:14

## 2020-01-01 RX ADMIN — POLYETHYLENE GLYCOL 3350 119 G: 17 POWDER, FOR SOLUTION ORAL at 19:14

## 2020-01-01 RX ADMIN — CEFTRIAXONE SODIUM 1 G: 1 INJECTION, POWDER, FOR SOLUTION INTRAMUSCULAR; INTRAVENOUS at 21:16

## 2020-01-01 RX ADMIN — HEPARIN SODIUM 5000 UNITS: 5000 INJECTION INTRAVENOUS; SUBCUTANEOUS at 05:49

## 2020-01-01 RX ADMIN — INSULIN LISPRO 1 UNITS: 100 INJECTION, SOLUTION INTRAVENOUS; SUBCUTANEOUS at 08:07

## 2020-01-01 RX ADMIN — IPRATROPIUM BROMIDE AND ALBUTEROL SULFATE 1 AMPULE: .5; 3 SOLUTION RESPIRATORY (INHALATION) at 15:40

## 2020-01-01 RX ADMIN — TRIAMTERENE AND HYDROCHLOROTHIAZIDE 1 TABLET: 37.5; 25 TABLET ORAL at 09:50

## 2020-01-01 RX ADMIN — POLYETHYLENE GLYCOL (3350) 17 G: 17 POWDER, FOR SOLUTION ORAL at 11:38

## 2020-01-01 RX ADMIN — ENOXAPARIN SODIUM 70 MG: 80 INJECTION SUBCUTANEOUS at 20:04

## 2020-01-01 RX ADMIN — ATROPINE SULFATE 1 MG: 0.1 INJECTION, SOLUTION INTRAVENOUS at 15:30

## 2020-01-01 RX ADMIN — AZITHROMYCIN MONOHYDRATE 500 MG: 500 INJECTION, POWDER, LYOPHILIZED, FOR SOLUTION INTRAVENOUS at 22:06

## 2020-01-01 RX ADMIN — HEPARIN SODIUM 5000 UNITS: 5000 INJECTION INTRAVENOUS; SUBCUTANEOUS at 12:59

## 2020-01-01 RX ADMIN — PROCHLORPERAZINE EDISYLATE 10 MG: 5 INJECTION INTRAMUSCULAR; INTRAVENOUS at 23:18

## 2020-01-01 RX ADMIN — ACETAMINOPHEN 650 MG: 325 TABLET ORAL at 01:49

## 2020-01-01 RX ADMIN — ASPIRIN 81 MG 81 MG: 81 TABLET ORAL at 14:49

## 2020-01-01 RX ADMIN — ACETAMINOPHEN 650 MG: 325 TABLET ORAL at 13:43

## 2020-01-01 RX ADMIN — ASPIRIN 81 MG: 81 TABLET, CHEWABLE ORAL at 13:10

## 2020-01-01 RX ADMIN — ISOSORBIDE DINITRATE 10 MG: 10 TABLET ORAL at 12:59

## 2020-01-01 RX ADMIN — Medication 10 ML: at 09:12

## 2020-01-01 RX ADMIN — ISOSORBIDE DINITRATE 10 MG: 10 TABLET ORAL at 13:43

## 2020-01-01 RX ADMIN — ISOSORBIDE DINITRATE 10 MG: 10 TABLET ORAL at 13:16

## 2020-01-01 RX ADMIN — CEFTRIAXONE SODIUM 1 G: 1 INJECTION, POWDER, FOR SOLUTION INTRAMUSCULAR; INTRAVENOUS at 23:09

## 2020-01-01 RX ADMIN — MICONAZOLE NITRATE: 20 POWDER TOPICAL at 20:14

## 2020-01-01 RX ADMIN — SODIUM CHLORIDE 1000 ML: 9 INJECTION, SOLUTION INTRAVENOUS at 21:13

## 2020-01-01 RX ADMIN — EPINEPHRINE 3 MCG: 0.1 INJECTION, SOLUTION ENDOTRACHEAL; INTRACARDIAC; INTRAVENOUS at 14:10

## 2020-01-01 RX ADMIN — ASPIRIN 81 MG: 81 TABLET, CHEWABLE ORAL at 09:32

## 2020-01-01 RX ADMIN — INSULIN LISPRO 1 UNITS: 100 INJECTION, SOLUTION INTRAVENOUS; SUBCUTANEOUS at 17:24

## 2020-01-01 RX ADMIN — SODIUM CHLORIDE 500 ML: 9 INJECTION, SOLUTION INTRAVENOUS at 19:01

## 2020-01-01 RX ADMIN — ROPINIROLE HYDROCHLORIDE 1 MG: 1 TABLET, FILM COATED ORAL at 20:57

## 2020-01-01 RX ADMIN — ISOSORBIDE DINITRATE 10 MG: 10 TABLET ORAL at 09:25

## 2020-01-01 RX ADMIN — ENOXAPARIN SODIUM 40 MG: 40 INJECTION SUBCUTANEOUS at 09:25

## 2020-01-01 RX ADMIN — ENOXAPARIN SODIUM 40 MG: 40 INJECTION SUBCUTANEOUS at 10:44

## 2020-01-01 RX ADMIN — TRAMADOL HYDROCHLORIDE 50 MG: 50 TABLET, COATED ORAL at 09:34

## 2020-01-01 RX ADMIN — IPRATROPIUM BROMIDE AND ALBUTEROL SULFATE 1 AMPULE: .5; 3 SOLUTION RESPIRATORY (INHALATION) at 14:01

## 2020-01-01 RX ADMIN — ISOSORBIDE DINITRATE 10 MG: 10 TABLET ORAL at 21:23

## 2020-01-01 RX ADMIN — Medication 2 G: at 17:32

## 2020-01-01 RX ADMIN — HEPARIN SODIUM 5000 UNITS: 5000 INJECTION INTRAVENOUS; SUBCUTANEOUS at 07:40

## 2020-01-01 RX ADMIN — CEFTRIAXONE 1 G: 1 INJECTION, POWDER, FOR SOLUTION INTRAMUSCULAR; INTRAVENOUS at 02:22

## 2020-01-01 RX ADMIN — IPRATROPIUM BROMIDE AND ALBUTEROL SULFATE 1 AMPULE: .5; 3 SOLUTION RESPIRATORY (INHALATION) at 10:50

## 2020-01-01 RX ADMIN — IPRATROPIUM BROMIDE AND ALBUTEROL SULFATE 1 AMPULE: .5; 3 SOLUTION RESPIRATORY (INHALATION) at 20:09

## 2020-01-01 RX ADMIN — Medication 10 ML: at 08:13

## 2020-01-01 RX ADMIN — IPRATROPIUM BROMIDE AND ALBUTEROL SULFATE 1 AMPULE: .5; 3 SOLUTION RESPIRATORY (INHALATION) at 07:13

## 2020-01-01 RX ADMIN — IPRATROPIUM BROMIDE AND ALBUTEROL SULFATE 1 AMPULE: .5; 3 SOLUTION RESPIRATORY (INHALATION) at 19:58

## 2020-01-01 ASSESSMENT — PULMONARY FUNCTION TESTS
PIF_VALUE: 18
PIF_VALUE: 16
PIF_VALUE: 17
PIF_VALUE: 19
PIF_VALUE: 19
PIF_VALUE: 16
PIF_VALUE: 1
PIF_VALUE: 26
PIF_VALUE: 17
PIF_VALUE: 2
PIF_VALUE: 19
PIF_VALUE: 19
PIF_VALUE: 16
PIF_VALUE: 17
PIF_VALUE: 24
PIF_VALUE: 19
PIF_VALUE: 16
PIF_VALUE: 18
PIF_VALUE: 17
PIF_VALUE: 16
PIF_VALUE: 22
PIF_VALUE: 18
PIF_VALUE: 22
PIF_VALUE: 0
PIF_VALUE: 1
PIF_VALUE: 0
PIF_VALUE: 19
PIF_VALUE: 18
PIF_VALUE: 17
PIF_VALUE: 17
PIF_VALUE: 18
PIF_VALUE: 0
PIF_VALUE: 18
PIF_VALUE: 0
PIF_VALUE: 17
PIF_VALUE: 16
PIF_VALUE: 1
PIF_VALUE: 17
PIF_VALUE: 2
PIF_VALUE: 2
PIF_VALUE: 1
PIF_VALUE: 22
PIF_VALUE: 19
PIF_VALUE: 17
PIF_VALUE: 17
PIF_VALUE: 18
PIF_VALUE: 17
PIF_VALUE: 18
PIF_VALUE: 1
PIF_VALUE: 20
PIF_VALUE: 24
PIF_VALUE: 30
PIF_VALUE: 18
PIF_VALUE: 0
PIF_VALUE: 16
PIF_VALUE: 26
PIF_VALUE: 16
PIF_VALUE: 18
PIF_VALUE: 21
PIF_VALUE: 21
PIF_VALUE: 17
PIF_VALUE: 19
PIF_VALUE: 20
PIF_VALUE: 17
PIF_VALUE: 17
PIF_VALUE: 18
PIF_VALUE: 1
PIF_VALUE: 20
PIF_VALUE: 22
PIF_VALUE: 16
PIF_VALUE: 18
PIF_VALUE: 0
PIF_VALUE: 16
PIF_VALUE: 18
PIF_VALUE: 18
PIF_VALUE: 1
PIF_VALUE: 18
PIF_VALUE: 18
PIF_VALUE: 0

## 2020-01-01 ASSESSMENT — PAIN SCALES - GENERAL
PAINLEVEL_OUTOF10: 0
PAINLEVEL_OUTOF10: 0
PAINLEVEL_OUTOF10: 2
PAINLEVEL_OUTOF10: 0
PAINLEVEL_OUTOF10: 5
PAINLEVEL_OUTOF10: 5
PAINLEVEL_OUTOF10: 7
PAINLEVEL_OUTOF10: 0
PAINLEVEL_OUTOF10: 6
PAINLEVEL_OUTOF10: 0
PAINLEVEL_OUTOF10: 0
PAINLEVEL_OUTOF10: 8
PAINLEVEL_OUTOF10: 0
PAINLEVEL_OUTOF10: 8
PAINLEVEL_OUTOF10: 6
PAINLEVEL_OUTOF10: 0
PAINLEVEL_OUTOF10: 0
PAINLEVEL_OUTOF10: 2
PAINLEVEL_OUTOF10: 0
PAINLEVEL_OUTOF10: 7
PAINLEVEL_OUTOF10: 0
PAINLEVEL_OUTOF10: 4
PAINLEVEL_OUTOF10: 3
PAINLEVEL_OUTOF10: 2
PAINLEVEL_OUTOF10: 6

## 2020-01-01 ASSESSMENT — PAIN DESCRIPTION - PAIN TYPE
TYPE: ACUTE PAIN
TYPE: SURGICAL PAIN
TYPE: CHRONIC PAIN
TYPE: ACUTE PAIN
TYPE: SURGICAL PAIN
TYPE: CHRONIC PAIN

## 2020-01-01 ASSESSMENT — PAIN DESCRIPTION - LOCATION
LOCATION: ABDOMEN
LOCATION: LEG
LOCATION: LEG
LOCATION: ABDOMEN

## 2020-01-01 ASSESSMENT — ENCOUNTER SYMPTOMS
ABDOMINAL PAIN: 1
SHORTNESS OF BREATH: 1
SHORTNESS OF BREATH: 1
EYE REDNESS: 0

## 2020-01-01 ASSESSMENT — LIFESTYLE VARIABLES: SMOKING_STATUS: 0

## 2020-01-01 ASSESSMENT — PAIN - FUNCTIONAL ASSESSMENT
PAIN_FUNCTIONAL_ASSESSMENT: 0-10
PAIN_FUNCTIONAL_ASSESSMENT: 0-10

## 2020-01-01 ASSESSMENT — PAIN DESCRIPTION - ORIENTATION
ORIENTATION: MID
ORIENTATION: LEFT

## 2020-01-01 ASSESSMENT — PAIN DESCRIPTION - DESCRIPTORS
DESCRIPTORS: ACHING
DESCRIPTORS: ACHING

## 2020-01-16 NOTE — PLAN OF CARE
Patient new to wound care center today. Patient has wound on left hip. Patient instructed to use purachol, and duoderm and to leave in place until Sunday. Spandagrip provided for BLE. Patient son at bedside and instruction provided for plan of care and follow up appointment set. Patient and son verbalized understanding.

## 2020-01-18 PROBLEM — L89.222 PRESSURE INJURY OF LEFT HIP, STAGE 2 (HCC): Status: ACTIVE | Noted: 2020-01-01

## 2020-01-18 PROBLEM — K56.600 PARTIAL SMALL BOWEL OBSTRUCTION (HCC): Status: ACTIVE | Noted: 2020-01-01

## 2020-01-18 PROBLEM — K56.609 SBO (SMALL BOWEL OBSTRUCTION) (HCC): Status: ACTIVE | Noted: 2020-01-01

## 2020-01-18 NOTE — ED NOTES
Pt to CT and return. Was able to give urine sample. Refused to have straight cath performed to obtained urine. Noted patient with excoriation to groin area.       Massiel Stack RN  01/18/20 7569

## 2020-01-18 NOTE — PROGRESS NOTES
RN prompted Pt to use urinal. Assisted Pt but he did not void. Pt incontinent at times and depends is wet. Changed Pt.

## 2020-01-18 NOTE — PROGRESS NOTES
Looking up orders for pt that will be in the writer of this notes care when transferred to [address] [place] [address]

## 2020-01-18 NOTE — ED NOTES
Repositioned in bed. Patient now laying on right side. Patient refusing to let writer check brief at this time.       Marimar Keys RN  01/18/20 7644

## 2020-01-18 NOTE — PROGRESS NOTES
[facility] Progress Note    Rosita Real     : 1924    DATE OF VISIT:  2020    Subjective:     Rosita Real is a 80 y.o. male who has a stage 2 pressure ulcer located on the left hip. Also presents with multiple scabs and abrasions to lower legs. Current complaint of pain in this ulcer? yes. Quality of pain: sore when he lies on it    Other significant symptoms or pertinent ulcer history: Initial visit. Wound to left hip for 1 month. Decreased mobility. Has tried to sleep more on right side. Wears undergarments for incontinence. Toenails long. Patient with RLS. Encouraged to keep nails short, make appointment with Podiatry for nail care, and purchase dermasavers for protection. Appetite is good. No fever or chills. Additional ulcer(s) noted? no.     Mr. Guerrero Candelario has a past medical history of Adenocarcinoma of prostate ([address] BPH (benign prostatic hypertrophy), CAD (coronary artery disease), Chronic kidney disease (CKD), stage III (moderate) ([address] Cirrhosis of liver ([address] Diabetes mellitus type 2, controlled ([address] Hyperlipidemia, Hyperlipidemia associated with type 2 diabetes mellitus ([address] Hypertension, Neoplasm of uncertain behavior of skin, Osteoarthrosis, Renal failure, Restless legs syndrome, and Type II or unspecified type diabetes mellitus without mention of complication, not stated as uncontrolled. He has a past surgical history that includes TURP (2019) and TURP (N/A, 2019). His family history includes Cancer in his mother; Stroke in his father. Mr. Guerrero Candelario reports that he has never smoked. He has never used smokeless tobacco. He reports that he does not drink alcohol or use drugs. His current medication list consists of [address] [place], aspirin, ferrous sulfate, glimepiride, isosorbide dinitrate, rOPINIRole, and triamterene-hydrochlorothiazide. Allergies: Patient has no known allergies.     Pertinent items from the review of systems are discussed in the HPI; the remainder of the ROS was reviewed and is negative. Objective:     Vitals:    01/16/20 1242   BP: 139/81   Pulse: 93   Temp: 98.1 °F (36.7 °C)   TempSrc: Oral   Weight: 152 lb 3.2 oz (69 kg)   Height: 5' 5\" (1.651 m)     General Appearance: alert and oriented to person, place and time, well developed and well-nourished, in no acute distress  Psychiatric:  Mood and affect appropriate for situation  Skin: warm and dry, no rash  Head: normocephalic and atraumatic  Eyes: pupils equal, round, sclerae anicteric, conjunctivae normal  ENT: no thrush or oral ulcers  Neck:No complaints, normal appearance  Pulmonary/Chest: Respirations easy at rest, no cough or respiratory distress  Cardiovascular: No chest pain, normal rate, toes warm, cap refill normal  Abdomen: No nausea or vomiting  Extremities: no cyanosis, edema or cellulitis  Musculoskeletal: Ambulatory, wheel chair for distance, moves all extremities, no deformities  Neurologic: distal sensation to light touch intact, no allodynia. Lexie-ulcer skin: intact. Ulcer(s): Left hip ulcer with red base and biofilm. Wound bed moist, moderate amount of serous drainage, edges open and attached. Dry, fully adherent, intact scabs to legs. Surrounding tissue and ulcer without signs and symptoms of infection. No purulence, malodor, erythema, increased temperature, or increased pain. Photos also saved in electronic chart.     Today's Wound Measurements, per RN documentation:  Wound 01/16/20 #1 Left hip, pressure, stage 2, onset 12/27/2019-Wound Length (cm): 0.3 cm    Wound 01/16/20 #1 Left hip, pressure, stage 2, onset 12/27/2019-Wound Width (cm): 0.3 cm    Wound 01/16/20 #1 Left hip, pressure, stage 2, onset 12/27/2019-Wound Depth (cm): 0.1 cm  _____________________________    Lab Results   Component Value Date    LABALBU 3.7 01/18/2020     Lab Results   Component Value Date    CREATININE 1.1 01/18/2020     Lab Results

## 2020-01-18 NOTE — PROGRESS NOTES
Consult has been called to Dr. Bertha Gillette on 1/18/20. Spoke with parul.  9:51 AM    Oksana Hurst  1/18/2020

## 2020-01-18 NOTE — PROGRESS NOTES
Pt transferred to [facility] from [place]. [place]. Oriented Pt to room and call light. Vitals are stable. Tele on. Pt resting in bed with call light in reach.

## 2020-01-18 NOTE — PROGRESS NOTES
Shift assessment completed. Pt incontinent of urine/minimal smear of stool. Changed Pt. Mepiplex applied to coccyx and stage 2 on left ischium. Pt resting in bed with call light in reach.

## 2020-01-18 NOTE — ED NOTES
Report to [facility] for transport to [facility]. The patient spilled the urinal on himself while trying to use it. Skin care provided. Bed linens changed. The patient is alert, oriented, and stable.      Zenon Rico RN  01/18/20 7051

## 2020-01-18 NOTE — PROGRESS NOTES
4 Eyes Skin Assessment     The patient is being assess for   Admission    I agree that 2 RN's have performed a thorough Head to Toe Skin Assessment on the patient. ALL assessment sites listed below have been assessed. Areas assessed by both nurses:   [x]   Head, Face, and Ears   [x]   Shoulders, Back, and Chest, Abdomen  [x]   Arms, Elbows, and Hands   [x]   Coccyx, Sacrum, and Ischium  [x]   Legs, Feet, and Heels        Stage 2 on left ishium-mepilex in place. Coccyx is red and blanchable-mepilex in place. Scabs noted on BLE. Scattered bruising on back and BUE. Scattered moles on back. Heels WDL. **SHARE this note so that the co-signing nurse is able to place an eSignature**    Co-signer eSignature: Electronically signed by Raghavendra Carrillo RN on 1/18/20 at 12:06 PM    Does the Patient have Skin Breakdown?   Yes [facility] WOUND CARE was Initiated documentation include the Lexie-wound, Wound Assessment, Measurements, Dressing Treatment, Drainage, and Color\",          Jordin Prevention initiated:  Yes   Wound Care Orders initiated:  Yes      [id number] nurse consulted for Pressure Injury (Stage 3,4, Unstageable, DTI, NWPT, Complex wounds)and New or Established Ostomies:  No      Primary Nurse eSignature: Electronically signed by Glenny Dave RN on 1/18/20 at 11:15 AM

## 2020-01-18 NOTE — ED PROVIDER NOTES
Emergency Department Encounter    Patient: Juanito Kamara  MRN: [id number]  : 1924  Date of Evaluation: 2020  ED Provider:  Gabby Bella    Triage Chief Complaint:   Abdominal Pain (states started yesterday and comes and goes)    [place]:  Juanito Kamara is a 80 y.o. male that presents for evaluation of generalized abdominal pain, he has a prior history of coronary artery disease chronic kidney disease, prior history of prostate adenocarcinoma, complains of diffuse abdominal pain has had no prior history of abdominal surgeries. Positive flatus, normal urine output. Afebrile. No active chest pain. No acute shortness of breath. No active chest pain. History obtained from the patient, as well as his son.     ROS - see HPI, below listed is current ROS at time of my eval:  General:  No fevers, no chills, no weakness  Eyes:  no discharge  ENT:  No sore throat, no nasal congestion  Cardiovascular:  No chest pain, no palpitations  Respiratory:  No shortness of breath, no cough, no wheezing  Gastrointestinal:  + pain, + nausea, no vomiting, no diarrhea  Musculoskeletal:  No muscle pain, no joint pain  Skin:  No rash, no pruritis  Neurologic:  no headache  Genitourinary:  No dysuria, no hematuria  Endocrine:  No unexpected weight gain, no unexpected weight loss  Extremities:  no edema, no pain    Past Medical History:   Diagnosis Date    Adenocarcinoma of prostate ([address] 2019    BPH (benign prostatic hypertrophy)     CAD (coronary artery disease)     Chronic kidney disease (CKD), stage III (moderate) (HCC)     Cirrhosis of liver ([address]     Diabetes mellitus type 2, controlled ([address] 2015    Hyperlipidemia     Hyperlipidemia associated with type 2 diabetes mellitus ([address] 2015    Hypertension     Neoplasm of uncertain behavior of skin 2011    Osteoarthrosis     Renal failure     Restless legs syndrome     Type II or unspecified type diabetes mellitus without mention of complication, not stated as uncontrolled      Past Surgical History:   Procedure Laterality Date    TURP  08/14/2019    TURP N/A 8/14/2019    CYSTOSCOPY TRANSURETHRAL RESECTION PROSTATE, CYSTOLITHOPAXY performed by Fide Walton MD at [facility] OR     Family History   Problem Relation Age of Onset    Cancer Mother         Bone CA    Stroke Father      Social History     Socioeconomic History    Marital status:      Spouse name: Not on file    Number of children: Not on file    Years of education: Not on file    Highest education level: Not on file   Occupational History    Not on file   Social Needs    Financial resource strain: Not on file    Food insecurity:     Worry: Not on file     Inability: Not on file    Transportation needs:     Medical: Not on file     Non-medical: Not on file   Tobacco Use    Smoking status: Never Smoker    Smokeless tobacco: Never Used   Substance and Sexual Activity    Alcohol use: No    Drug use: No    Sexual activity: Not Currently     Partners: Female   Lifestyle    Physical activity:     Days per week: Not on file     Minutes per session: Not on file    Stress: Not on file   Relationships    Social connections:     Talks on phone: Not on file     Gets together: Not on file     Attends Rastafarian service: Not on file     Active member of club or organization: Not on file     Attends meetings of clubs or organizations: Not on file     Relationship status: Not on file    Intimate partner violence:     Fear of current or ex partner: Not on file     Emotionally abused: Not on file     Physically abused: Not on file     Forced sexual activity: Not on file   Other Topics Concern    Not on file   Social History Narrative    Not on file     Current Facility-Administered Medications   Medication Dose Route Frequency Provider Last Rate Last Dose    morphine 4 MG/ML injection        Stopped at 01/18/20 0110     Current Outpatient Medications   Medication Sig Dispense Refill    ferrous sulfate 325 (65 Fe) MG tablet Take 325 mg by mouth every other day      isosorbide dinitrate (ISORDIL) 10 MG tablet TAKE 1 TABLET THREE TIMES DAILY 90 tablet 0    triamterene-hydrochlorothiazide (DYAZIDE) 37.5-25 MG per capsule TAKE 1 CAPSULE ONCE DAILY 30 capsule 2    glimepiride (AMARYL) 2 MG tablet TAKE 1 TABLET TWICE DAILY 60 tablet 2    rOPINIRole (REQUIP) 1 MG tablet TAKE ONE TABLET AT BEDTIME 30 tablet 2    Misc. Devices (MARIA ISABEL ROLLING WALKER ELITE) MISC Use for ambulation 1 each 0    aspirin 81 MG tablet Take 1 tablet by mouth daily. 30 tablet      No Known Allergies    Nursing Notes Reviewed    Physical Exam:  Triage VS:    ED Triage Vitals   Enc Vitals Group      BP       Pulse       Resp       Temp       Temp src       SpO2       Weight       Height       Head Circumference       Peak Flow       Pain Score       Pain Loc       Pain Edu? Excl. in [address]? My pulse ox interpretation is - normal    General appearance:  No acute distress. Skin:  Warm. Dry. No rash. Neck:  Trachea midline. Heart:  Regular rate and rhythm, normal S1 & S2.    Perfusion:  intact  Respiratory:  Lungs clear to auscultation bilaterally. Respirations nonlabored. Abdominal:  diffuse tenderness to palpation, no rebound guarding or rigidity, neg Stewart's sign, neg McBurney's point tenderness to palpation, normal bowel sounds, no masses, no organomegaly, no pulsatile masses  Back:  No CVA TTP  Extremity:    normal ROM   Neurological:  Alert and oriented times 3.       I have reviewed and interpreted all of the currently available lab results from this visit (if applicable):  Results for orders placed or performed during the hospital encounter of 01/18/20   Comprehensive Metabolic Panel w/ Reflex to MG   Result Value Ref Range    Sodium 140 136 - 145 mmol/L    Potassium reflex Magnesium 4.4 3.5 - 5.1 mmol/L    Chloride 103 99 - 110 mmol/L    CO2 22 21 - 32 mmol/L    Anion Gap 15 3 - 16 reflex to microscopic   Result Value Ref Range    Color, UA Yellow Straw/Yellow    Clarity, UA CLOUDY (A) Clear    Glucose, Ur Negative Negative mg/dL    Bilirubin Urine Negative Negative    Ketones, Urine Negative Negative mg/dL    Specific Gravity, UA 1.020 1.005 - 1.030    Blood, Urine SMALL (A) Negative    pH, UA 7.0 5.0 - 8.0    Protein, UA 30 (A) Negative mg/dL    Urobilinogen, Urine 1.0 <2.0 E.U./dL    Nitrite, Urine POSITIVE (A) Negative    Leukocyte Esterase, Urine LARGE (A) Negative    Microscopic Examination YES     Urine Type NotGiven    Microscopic Urinalysis   Result Value Ref Range    WBC, UA 10-20 (A) 0 - 5 /HPF    RBC, UA 3-5 (A) 0 - 2 /HPF    Epi Cells 0-2 /HPF    Bacteria, UA 3+ (A) /HPF    Amorphous, UA Rare (A) /HPF    Crystals Few Ca. Oxalate (A) /HPF   Lactic Acid, Plasma   Result Value Ref Range    Lactic Acid 1.4 0.4 - 2.0 mmol/L      Radiographs (if obtained):  Radiologist's Report Reviewed:  No results found. . Findings suggest small bowel obstruction with the transition point in the  terminal ileum where wall thickening is suspected. 2. Diverticulosis without scan evidence for diverticulitis. 3. Bladder wall thickening may represent chronic outlet obstruction or  cystitis.     EKG (if obtained): (All EKG's are interpreted by myself in the absence of a cardiologist)      MDM:  Patient here with abdominal pain. I estimate there is LOW risk for an acute emergent intraabdominal process including, but not limited to, acute appendicitis, bowel obstruction, acute cholecystitis, ruptured diverticulitis, incarcerated/strangling hernia,  perforated bowel/ulcer. Workup reveals UTI mild white blood cell count elevation,, pH is normal, patient's CAT scan demonstrates thickening of the ileocecal region no definitive evidence of the appendix is noted there is no specific evaluation of the appendix, he does have evidence of significant cystitis and underlying ileus with possible early SBO.

## 2020-01-19 NOTE — PROGRESS NOTES
Shift assessment completed. Administered suppository and glycolax. Pt is constipated. Attends is clean/dry. Applied mycostatin powder to perineum. Repositioned Pt. Provided ice chips. Pt tolerated well. Pt resting in bed with call light in reach.

## 2020-01-19 NOTE — PROGRESS NOTES
Pt in bed resting comfortably and appears in no distress. Pt denies any needs at this time. Will continue to monitor. Call light within reach. Bedside report given to Luz Maria Birch RN for transfer of care.

## 2020-01-19 NOTE — PROGRESS NOTES
PM assessment completed, see flow sheet. Pt is alert and oriented. Respirations are even & easy at rest. Prn pain medication given per request, see mar. Pt denies needs at this time. SR up x 2, and bed in low position. Call light is within reach.

## 2020-01-19 NOTE — PROGRESS NOTES
Pt was given suppository earlier. Pt had large BM x 1. Pt asking for food. Explained Pt will start on clear liq diet for now.

## 2020-01-19 NOTE — CARE COORDINATION
Case Management Assessment  Initial Evaluation    Date/Time of Evaluation: 1/19/2020 4:46 PM  Assessment Completed by: Marleny Muniz    Patient Name: Bri Morris  YOB: 1924  Diagnosis: SBO (small bowel obstruction) ([facility] [address] [P69.301]  SBO (small bowel obstruction) ([facility] [address] [R32.948]  Partial small bowel obstruction ([facility] [address] [K65.261]  Date / Time: 1/18/2020 12:51 AM  Admission status/Date:1/18/2020 0315 inpt  Chart Reviewed: Yes      Patient Interviewed: Yes   Family Interviewed:  No      Hospitalization in the last 30 days:  No    Contacts  :     Relationship to Patient:   Phone Number:    Alternate Contact:     Relationship to Patient:     Phone Number:    Met with:    Current PCP TANG Villar MD    Financial  Medicare  Precert required for SNF : [facility]        3 night stay required - Y, N    ADLS  Support Systems: Family Members  Transportation: son    Meal Preparation: son    Housing  Home Environment: ranch alone  Steps: 0  Plans to Return to Present Housing: Yes  Other Identified Issues: hard of hearing    Amber Blanchard 78  Currently active with [address] : No  Type of Home Care Services: PT, OT, Nursing Services, Aide Services  Passport/Waiver : No  :                      Phone Number:    Passport/Waiver Services:           [address] [facility] Provider: none  Equipment: yes Walker___Cane___RTS___ BSC___Shower Chair___  02__ at ____Liter(s)---Uses________HHN___ CPAP___ BiPap___ Hospital Bed___W/C____Other__rollator______      Has Home O2 in place on admit:  No  Informed of need to bring portable home O2 tank on day of discharge for nursing to connect prior to leaving:   Not Indicated  Verbalized agreement/Understanding:   Not Indicated    Community Service Affiliation  Dialysis:  No    · Name:  · Location  · Dialysis Schedule:  · Phone:   · Fax:     Outpatient PT/OT: No    Cancer Center: No     CHF Clinic: No     Pulmonary Rehab: No  Pain Clinic:

## 2020-01-19 NOTE — CONSULTS
or mouth. He denies weight loss. No chest pain. No SOB. No urinary complaints. No musculoskeletal complaints. No skin rashes. No neurologic deficits. No bleeding tendencies. GI complaints include abdominal pain. PHYSICAL EXAM:  VITALS:  /64   Pulse 82   Temp 98.3 °F (36.8 °C) (Axillary)   Resp 14   Ht 5' 5\" (1.651 m)   Wt 152 lb (68.9 kg)   SpO2 96%   BMI 25.29 kg/m²     CONSTITUTIONAL:  alert, no apparent distress and normal weight  EYES:  sclera clear  ENT:  normocepalic, without obvious abnormality  NECK:  supple, symmetrical, trachea midline  LUNGS:  clear to auscultation  CARDIOVASCULAR:  regular rate and rhythm   ABDOMEN:   non-distended, tenderness noted diffusely without peritoneal signs,  and soft  MUSCULOSKELETAL:  No pitting edema lower extremities  NEUROLOGIC:  Mental Status Exam:  Level of Alertness:   awake  Orientation:   person, place, time  SKIN:  no rashes    DATA:    CBC:   Recent Labs     01/18/20  0115 01/19/20  0532   WBC 12.2* 9.9   HGB 12.7* 12.1*   HCT 41.5 39.0*    227     BMP:    Recent Labs     01/18/20  0115 01/19/20  0532    140   K 4.4 3.8    108   CO2 22 22   BUN 28* 34*   CREATININE 1.1 1.1   GLUCOSE 163* 136*     Hepatic:   Recent Labs     01/18/20  0115 01/19/20  0532   AST 27 18   ALT 8* 7*   BILITOT 0.6 0.3   ALKPHOS 104 80       Radiology Review:  CT with dilated SB    ASSESSMENT:  SBO  UTI    PLAN:  AXR today  Follow exam and clinical course. No abdominal surgery history, so possible could be ileus from UTI.       [facility]

## 2020-01-19 NOTE — PROGRESS NOTES
IM Progress Note    Admit Date:  1/18/2020  1    Interval history:  Admitted for UTi and sbo     Subjective:  Mr. Jose Chin feels ok today. Wishes to eat something. Passing gas but no BM    Objective:   /69   Pulse 97   Temp 100 °F (37.8 °C) (Oral)   Resp 16   Ht 5' 5\" (1.651 m)   Wt 152 lb (68.9 kg)   SpO2 96%   BMI 25.29 kg/m²       Intake/Output Summary (Last 24 hours) at 1/19/2020 0752  Last data filed at 1/19/2020 5022  Gross per 24 hour   Intake 895 ml   Output --   Net 895 ml       Physical Exam:        General: thin elderly male up in bed,  Awake, alert and oriented. Appears to be not in any distress  Mucous Membranes:  Pink , anicteric  Neck: No JVD, no carotid bruit, no thyromegaly  Chest:  Clear to auscultation bilaterally, no added sounds  Cardiovascular:  RRR S1S2 heard, no murmurs or gallops  Abdomen:  Soft, ++distended, non tender, no organomegaly, BS present  Extremities: No edema or cyanosis.  Distal pulses well felt  Neurological : grossly normal      Medications:   Scheduled Medications:    cefTRIAXone (ROCEPHIN) IV  1 g Intravenous Q24H    insulin lispro  0-6 Units Subcutaneous TID WC    insulin lispro  0-3 Units Subcutaneous Nightly    sodium chloride flush  10 mL Intravenous 2 times per day    enoxaparin  40 mg Subcutaneous Daily    [START ON 1/20/2020] influenza virus vaccine  0.5 mL Intramuscular Once    miconazole   Topical BID     I   dextrose      sodium chloride 75 mL/hr at 01/18/20 2010     glucose, dextrose, glucagon (rDNA), dextrose, sodium chloride flush, magnesium hydroxide, ondansetron, morphine    Lab Data:  Recent Labs     01/18/20 0115 01/19/20  0532   WBC 12.2* 9.9   HGB 12.7* 12.1*   HCT 41.5 39.0*   MCV 79.1* 79.8*    227     Recent Labs     01/18/20 0115 01/19/20  0532    140   K 4.4 3.8    108   CO2 22 22   BUN 28* 34*   CREATININE 1.1 1.1     No results for input(s): CKTOTAL, CKMB, CKMBINDEX, TROPONINI in the last 72

## 2020-01-19 NOTE — PLAN OF CARE
Problem: Falls - Risk of:  Goal: Will remain free from falls  Description  Will remain free from falls  Outcome: Ongoing  Goal: Absence of physical injury  Description  Absence of physical injury  Outcome: Ongoing     Problem: Risk for Impaired Skin Integrity  Goal: Tissue integrity - skin and mucous membranes  Description  Structural intactness and normal physiological function of skin and  mucous membranes.   Outcome: Ongoing     Problem: Sensory:  Goal: General experience of comfort will improve  Description  General experience of comfort will improve  Outcome: Ongoing     Problem: Urinary Elimination:  Goal: Signs and symptoms of infection will decrease  Description  Signs and symptoms of infection will decrease  Outcome: Ongoing  Goal: Ability to reestablish a normal urinary elimination pattern will improve - after catheter removal  Description  Ability to reestablish a normal urinary elimination pattern will improve  Outcome: Ongoing  Goal: Complications related to the disease process, condition or treatment will be avoided or minimized  Description  Complications related to the disease process, condition or treatment will be avoided or minimized  Outcome: Ongoing     Problem: Infection:  Goal: Will remain free from infection  Description  Will remain free from infection  Outcome: Ongoing     Problem: Safety:  Goal: Free from accidental physical injury  Description  Free from accidental physical injury  Outcome: Ongoing  Goal: Free from intentional harm  Description  Free from intentional harm  Outcome: Ongoing     Problem: Daily Care:  Goal: Daily care needs are met  Description  Daily care needs are met  Outcome: Ongoing     Problem: Discharge Planning:  Goal: Patients continuum of care needs are met  Description  Patients continuum of care needs are met  Outcome: Ongoing

## 2020-01-20 PROBLEM — E43 SEVERE PROTEIN-CALORIE MALNUTRITION (HCC): Chronic | Status: ACTIVE | Noted: 2020-01-01

## 2020-01-20 NOTE — PROGRESS NOTES
IM Progress Note    Admit Date:  1/18/2020  2    Interval history:    49-year-old male admitted for UTi and SBO   Urine cultures now growing Proteus. Seen by general surgery for SBO. This is resolved now patient has had multiple bowel movements. Subjective:  Mr. Lei Barthel seen . He is weak . He has not been up out of bed . He is tolerated clear liquid diet . Low grade Fevers   + BM     Objective:   BP (!) 104/50   Pulse 69   Temp 99.1 °F (37.3 °C) (Oral)   Resp 16   Ht 5' 5\" (1.651 m)   Wt 152 lb (68.9 kg)   SpO2 97%   BMI 25.29 kg/m²       Intake/Output Summary (Last 24 hours) at 1/20/2020 1015  Last data filed at 1/20/2020 0925  Gross per 24 hour   Intake 841 ml   Output --   Net 841 ml       Physical Exam:  General: thin elderly male up in bed,  Awake, alert and oriented. Appears to be not in any distress  Mucous Membranes:  Pink , anicteric  Neck: No JVD, no carotid bruit, no thyromegaly  Chest:  Clear to auscultation bilaterally, no added sounds  Cardiovascular:  RRR S1S2 heard, no murmurs or gallops  Abdomen:  Soft, ++distended, non tender, no organomegaly, BS present  Extremities: No edema or cyanosis.  Distal pulses well felt  Neurological : grossly normal      Medications:   Scheduled Medications:    potassium & sodium phosphates  2 packet Oral BID    polyethylene glycol  17 g Oral Daily    aspirin  81 mg Oral Daily    isosorbide dinitrate  10 mg Oral TID    cefTRIAXone (ROCEPHIN) IV  1 g Intravenous Q24H    insulin lispro  0-6 Units Subcutaneous TID WC    insulin lispro  0-3 Units Subcutaneous Nightly    sodium chloride flush  10 mL Intravenous 2 times per day    enoxaparin  40 mg Subcutaneous Daily    influenza virus vaccine  0.5 mL Intramuscular Once    miconazole   Topical BID     I   dextrose       bisacodyl, glucose, dextrose, glucagon (rDNA), dextrose, sodium chloride flush, magnesium hydroxide, ondansetron, morphine    Lab Data:  Recent Labs     01/18/20  0113 BM.  -SBO/ileus is resolved. -Advance diet as tolerated    Urinary tract infection  -Gram-negative inés infection urine cultures growing Proteus.   -Continue Rocephin day 3  -Received IV fluids    Leukocytosis due to   UTI  -Monitor white blood count . Generalized weakness and debility  -Secondary to above  -Activity as tolerated  -PT OT consult, might need SNF    Hyponatremia and hypophosphatemia. -Replete with oral phos snack    DM type 2  - monitor glucose. SSI coverage.   - home meds on hold      Hypertension  - BP stable. Holding home medications.      CKD stage 3  - stable. Monitor BMP     Prostate cancer  - no treatment planned     CAD  - resumed home medications  - denies chest pain.      DVT Prophylaxis: Lovenox   DIET LOW FIBER; Carb Control: 4 carb choices (60 gms)/meal   Code Status: Full Code       tele - NSR .    Quynh Calvin MD 1/20/2020 10:15 AM

## 2020-01-20 NOTE — PROGRESS NOTES
Patient was not able to demonstrate the ability to move from a reclining position to an upright position within the recliner.

## 2020-01-20 NOTE — PROGRESS NOTES
AM assessment completed, see flow sheet. Pt is pleasant, alert and oriented. [place]. Vital signs are WNL. Respirations are even & easy. No complaints voiced. Pt denies needs at this time. SR up x 2, and bed in low position. Call light is within reach. Pt verbalizes understanding of how to use call light.

## 2020-01-20 NOTE — PROGRESS NOTES
Inpatient Physical Therapy Evaluation and Treatment    Unit: [facility]  Date:  1/20/2020  Patient Name:    Bill Melchor  Admitting diagnosis:  SBO (small bowel obstruction) ([facility] [address] [K56.609]  SBO (small bowel obstruction) ([facility] [address] [K56.609]  Partial small bowel obstruction ([facility] [address] [K56.600]  Admit Date:  1/18/2020  Precautions/Restrictions/WB Status/ Lines/ Wounds/ Oxygen: fall risk and bed/chair alarm, [place], perineal rash, B LE wounds and stage 2 L buttock wound    Per EMR: 80 y.o. male with DM type 2, RLS, hypertension, hyperlipidemia, Cirrhosis, CKD stage 3, CAD, BPH, and prostate cancer who presents to [facility] with c/o generalized abdominal pain. Labs with mild leukocytosis. Patient fount to have UTI and small bowel obstruction. Treatment Time:  11:07-11:57  Treatment Number:  1   Timed Code Treatment Minutes: 40 minutes  Total Treatment Minutes:  50  minutes    Patient Goals for Therapy: \" go home \"          Discharge Recommendations: Home with 24/7 assist and home therapy  DME needs for discharge: Needs Met       Therapy recommendation for EMS Transport: can transport by wheelchair    Therapy recommendations for staff:   Assist of 1 with use of rolling walker (RW) and gait belt (or hold pt's pants) for all transfers and ambulation to/from bathroom - hx of R knee buckling when tired    Home Health S4 Level Recommendation:  Level 1 Standard  AM-PAC Mobility Score    AM-PAC Inpatient Mobility Raw Score : 19       Preadmission Environment    Pt. Lives with family Wilfred Wiley. Pt states having Home Health Aid when son is not home  Home environment:    one story home  Steps to enter first floor:   1 steps to enter       Steps to second floor: no hand rail  Bathroom:       Walk-in Shower, Grab bars and Shower Chair   Equipment owned:      [address] [facility], [address]  and raised toilet seat w/o arms with grab bars near toilet     Preadmission Status / PLOF:  History of falls             No recent falls.  Pt states having a fall several months ago   Pt. Able to drive          No, Son Tatiana Ren able to provide transportation   Pt Fully independent with ADL's         No  Pt. Required assistance from family for:  Bathing, Dressing, Cooking, Cleaning and Laundry   Pt. Fully independent for transfers and gait and walked with: Lela Perez. Pt states using RW when in house and rollator when in the community      Pain  No  Rating:NA  Location:denies pain   Pain Medicine Status: Denies need       Cognition    A&O Person   Able to follow 1 step commands. Pt very [place]      Subjective  Patient lying supine in bed with no family present  Pt agreeable to this PT eval & tx. Upper Extremity ROM/Strength  Please see OT evaluation.       Lower Extremity ROM / Strength    AROM WFL: No  ROM limitations: lacking ~10 deg bilaterally 2/2 B hamstring contractures    Strength Assessment (measured on a 0-5 scale):  R LE   Quad   5/5   Ant Tib  5/5   Hamstring 5/5   Iliopsoas 5/5  L LE  Quad   5/5   Ant Tib  5/5   Hamstring 5/5   Iliopsoas 5/5    Lower Extremity Sensation    WFL    Lower Extremity Proprioception:   NT    Coordination and Tone  WFL    Balance  Static Sitting:  Good    Tolerance: forward head, rounded shoulders and rigid thoracic spine   Dynamic Sitting:  Good   Static Standing: Good -    Tolerance: supervision with RW, pt requires consistent UE support on walker  Dynamic Standing: Good -     Bed Mobility   Supine to Sit:   Modified Independent use of rail with bed flat  Sit to Supine:  Not Tested  Rolling:   Modified Independent  Scooting at EOB: Supervision  Scooting to [facility]:  Not Tested    Transfer Training     Sit to stand:   CGA from bed; min A from commode with use of grab bar; CGA from [facility] with armrests  Stand to sit:   Min A to commode with use of grab bar; CGA to [facility] and chair with armrests  Bed to Chair:  Not Tested with use of N/A - pt ambulated    Gait gait completed as indicated below  Distance:  85 + 5 + 5 ft  Deviations (firm

## 2020-01-20 NOTE — PROGRESS NOTES
Heels red, blanchable and mushy. Preventatives placed. Pt cooperative and understanding. Coccyx preventative in place.

## 2020-01-20 NOTE — PROGRESS NOTES
General Surgery - Jesi Rodriguez, [address] [facility]  Daily Progress Note    Pt Name: Laurel Bo  Medical Record Number: [id number]  Date of Birth 9/6/1924   Today's Date: 1/20/2020    ASSESSMENT  1. ABD xray 1/19: no dilated SB, mod-large amount of stool in colon  2. ABD: soft, non-tender, no N/V, + multiple BMs  3. Pt reports being \"hungry\"   4. UTI: Proteus mirabilis    PLAN  1. Continue Mirilax  2. Advance diet to soft   3. Antibiotics for UTI per primary team  4. Pt ileus 2/2 UTI appears resolved. Will sign off. Please feel free to call for any future general surgery needs. Thuan Mcdaniel has improved from yesterday. Pain is well controlled. He has no nausea and no vomiting. He has passed flatus and has had a bowel movement. He is tolerating clear liquids. Current activity is up with assistance    OBJECTIVE  VITALS:  height is 5' 5\" (1.651 m) and weight is 152 lb (68.9 kg). His oral temperature is 99.1 °F (37.3 °C). His blood pressure is 104/50 (abnormal) and his pulse is 70. His respiration is 16 and oxygen saturation is 97%. VITALS:  BP (!) 104/50   Pulse 70   Temp 99.1 °F (37.3 °C) (Oral)   Resp 16   Ht 5' 5\" (1.651 m)   Wt 152 lb (68.9 kg)   SpO2 97%   BMI 25.29 kg/m²   INTAKE/OUTPUT:    Intake/Output Summary (Last 24 hours) at 1/20/2020 1140  Last data filed at 1/20/2020 0925  Gross per 24 hour   Intake 841 ml   Output --   Net 841 ml     GENERAL: alert, cooperative, no distress, very [place]  I/O last 3 completed shifts:   In: 601 [I.V.:601]  Out: -   I/O this shift:  In: 240 [P.O.:240]  Out: -     LABS  Recent Labs     01/18/20  0115 01/18/20  0200 01/19/20  0532 01/20/20  0531   WBC 12.2*  --  9.9 5.9   HGB 12.7*  --  12.1* 10.1*   HCT 41.5  --  39.0* 32.9*     --  227 201     --  140 135*   K 4.4  --  3.8 3.6     --  108 103   CO2 22  --  22 20*   BUN 28*  --  34* 30*   CREATININE 1.1  --  1.1 0.9   MG  --   --   --  2.20   PHOS  --   --   --  2.3*   CALCIUM 9.3  --  8.3

## 2020-01-20 NOTE — PROGRESS NOTES
Difficulty hearing. Missing teeth, per pt no chewing/swallowing issues. No N/V. Lt hip Stage 2. Severe muscle loss with depression at temple. Clavicle slightly visible, but prominent upon NFPE. Severe fat loss to tricep, and orbital area.  Last BM 1/19/20  · Wound Type: Stage II(Lt hip Stage 2, wound to buttock)  · Current Nutrition Therapies:  · Oral Diet Orders: Clear Liquid, Carb Control 4 Carbs/Meal, Low Fiber   · Oral Diet intake: Unable to assess  · Oral Nutrition Supplement (ONS) Orders: None  · ONS intake: Unable to assess  · Anthropometric Measures:  · Ht: 5' 5\" (165.1 cm)   · Current Body Wt: 152 lb (68.9 kg)(unknown wt method)  · Admission Body Wt: 152 lb (68.9 kg)  · Usual Body Wt: 150 lb (68 kg)(stated per pt)  · % Weight Change:  Per OP visits 6/12/19 bs=558# pt with stated wt loss of 22#; 12/31/18 op visit mx=297#  · Ideal Body Wt: 136 lb (61.7 kg), % Ideal Body 111%  · BMI Classification: BMI 25.0 - 29.9 Overweight    Nutrition Interventions:   Continue current diet  Continued Inpatient Monitoring, Coordination of Care, Coordination of Community Care    Nutrition Evaluation:   · Evaluation: Goals set   · Goals: PO intake >50%    · Monitoring: Meal Intake, Monitor Bowel Function, Pertinent Labs, Weight, Wound Healing, Skin Integrity, Constipation      Electronically signed by Manuel Enriquez RD, LD on 1/20/20 at 2:19 PM    Contact Number: [phone]

## 2020-01-20 NOTE — CARE COORDINATION
INTERDISCIPLINARY PLAN OF CARE CONFERENCE    Date/Time: 1/20/2020 1:45 PM  Completed by: Daryle Mutter, Case Management      Patient Name:  Wilber Estrada  YOB: 1924  Admitting Diagnosis: SBO (small bowel obstruction) ([facility] [address] [K56.609]  SBO (small bowel obstruction) ([facility] [address] [J38.638]  Partial small bowel obstruction ([facility] [address] [P82.536]     Admit Date/Time:  1/18/2020 12:51 AM    Chart reviewed. Interdisciplinary team met to discuss patient progress and discharge plans. Disciplines included Case Management, Nursing, and Dietitian. Current Status: Stable    PT/OT recommendation: home with 24/7 care and home therapy    Anticipated Discharge Date: TBD  Expected D/C Disposition:  Home  Confirmed plan with patient and/or family Yes-DIL  Discharge Plan Comments:  Reviewed chart and met with pt who is very [place]. Spoke with RONNIE [facility] who states pt lives home with her and her spouse who is pts Son. Misael TRUJILLO pt has 24/7 care and is never left alone. Per DIL has no in home services. Discussed HHC at [place] and RONNIE will discuss with family and let writer know at [employer]. Plan is for pt to return home at [place]. Will cont to follow.         Home O2 in place on admit: No  Pt informed of need to bring portable home O2 tank on day of discharge for nursing to connect prior to leaving:  Not Indicated  Verbalized agreement/Understanding:  Not Indicated

## 2020-01-20 NOTE — PROGRESS NOTES
Pt in bed resting comfortably and appears in no distress. Pt denies any needs at this time. Will continue to monitor. Call light within reach. Bedside report given to Nevaeh PERES and Marques Mendoza RN for transfer of care .

## 2020-01-20 NOTE — PROGRESS NOTES
Occupational Therapy  Inpatient Occupational Therapy  Evaluation and Treatment    Unit: [facility]  Date:  1/20/2020  Patient Name:    Divya Daniel  Admitting diagnosis:  SBO (small bowel obstruction) ([facility] [address] [K56.609]  SBO (small bowel obstruction) ([facility] [address] [K56.609]  Partial small bowel obstruction ([facility] [address] [K56.600]  Admit Date:  1/18/2020  Precautions/Restrictions/WB Status/ Lines/ Wounds/ Oxygen: fall risk and bed/chair alarm    Treatment Time:  6837-1217  Treatment Number: 1   Billable Treatment Time: 41 minutes   Total Treatment Time:   51   minutes    Patient Goals for Therapy:  \" To get better \"      Discharge Recommendations: Home with 24/7 assist and home therapy and Home with 24/7 supervision and home therapy  DME needs for discharge: Needs Met and Pt has needed equipment        Therapy recommendations for staff:   Assist of 1 (contact guard assist) with use of rolling walker (RW) for all transfers and ambulation to/from bathroom  to/from chair  within halls      Home Health S4 Level Recommendation:  Level 2 Social  AM-PAC Score: 15    Preadmission Environment    Pt. Lives with family Andra Garza. Pt states having Home Health Aid when son is not home  Home environment:  one story home  Steps to enter first floor:   1 steps to enter    Steps to second floor: no hand rail  Bathroom:  Walk-in Shower, Grab bars and Shower Chair   Equipment owned:  [place], [address]  and raised toilet seat w/o arms with grab bars near toilet    Preadmission Status / PLOF:  History of falls No recent falls. Pt states having a fall several months ago   Pt. Able to drive No, Son Nan Cali) able to provide transportation   Pt Fully independent with ADL's No  Pt. Required assistance from family for:  Bathing, Dressing, Cooking, Cleaning and Laundry   Pt. Fully independent for transfers and gait and walked with: Stevo .  Pt states using RW when in house and rollator when in the community     Pain  No  Rating:NA  Location:denies pain

## 2020-01-21 PROBLEM — K56.609 SBO (SMALL BOWEL OBSTRUCTION) (HCC): Status: RESOLVED | Noted: 2020-01-01 | Resolved: 2020-01-01

## 2020-01-21 PROBLEM — K56.600 PARTIAL SMALL BOWEL OBSTRUCTION (HCC): Status: RESOLVED | Noted: 2020-01-01 | Resolved: 2020-01-01

## 2020-01-21 NOTE — PROGRESS NOTES
Pt's DIL contacted by writer and made aware of D/C order. DIL states she gets off work at 1700 and someone will be here to get him after that. Pt made aware that family knows he is being discharged and will be here to get him after 65. Pt expresses that he is happy to hear that.

## 2020-01-21 NOTE — PLAN OF CARE
Problem: Falls - Risk of:  Goal: Will remain free from falls  Description  Will remain free from falls  Outcome: Ongoing  Goal: Absence of physical injury  Description  Absence of physical injury  Outcome: Ongoing     Problem: Risk for Impaired Skin Integrity  Goal: Tissue integrity - skin and mucous membranes  Description  Structural intactness and normal physiological function of skin and  mucous membranes.   Outcome: Ongoing     Problem: Sensory:  Goal: General experience of comfort will improve  Description  General experience of comfort will improve  Outcome: Ongoing     Problem: Urinary Elimination:  Goal: Signs and symptoms of infection will decrease  Description  Signs and symptoms of infection will decrease  Outcome: Ongoing  Goal: Ability to reestablish a normal urinary elimination pattern will improve - after catheter removal  Description  Ability to reestablish a normal urinary elimination pattern will improve  Outcome: Ongoing  Goal: Complications related to the disease process, condition or treatment will be avoided or minimized  Description  Complications related to the disease process, condition or treatment will be avoided or minimized  Outcome: Ongoing     Problem: Infection:  Goal: Will remain free from infection  Description  Will remain free from infection  Outcome: Ongoing     Problem: Safety:  Goal: Free from accidental physical injury  Description  Free from accidental physical injury  Outcome: Ongoing  Goal: Free from intentional harm  Description  Free from intentional harm  Outcome: Ongoing     Problem: Daily Care:  Goal: Daily care needs are met  Description  Daily care needs are met  Outcome: Ongoing     Problem: Discharge Planning:  Goal: Patients continuum of care needs are met  Description  Patients continuum of care needs are met  Outcome: Ongoing     Problem: OXYGENATION/RESPIRATORY FUNCTION  Goal: Patient will maintain patent airway  Outcome: Ongoing  Goal: Patient will achieve/maintain normal respiratory rate/effort  Description  Respiratory rate and effort will be within normal limits for the patient  Outcome: Ongoing     Problem: HEMODYNAMIC STATUS  Goal: Patient has stable vital signs and fluid balance  Outcome: Ongoing     Problem: FLUID AND ELECTROLYTE IMBALANCE  Goal: Fluid and electrolyte balance are achieved/maintained  Outcome: Ongoing     Problem: ACTIVITY INTOLERANCE/IMPAIRED MOBILITY  Goal: Mobility/activity is maintained at optimum level for patient  Outcome: Ongoing     Problem: Nutrition  Goal: Optimal nutrition therapy  1/21/2020 0104 by Simin Martinez RN  Outcome: Ongoing  1/20/2020 1417 by Dani Moritz, RD, LAKHWINDER  Outcome: Not Met This Shift  Goal: Understanding of nutritional guidelines  1/21/2020 0104 by Simin Martinez RN  Outcome: Ongoing  1/20/2020 1417 by Dani Moritz, RD, LAKHWINDER  Outcome: Ongoing

## 2020-01-21 NOTE — PROGRESS NOTES
Pt resting in recliner watching TV. Respirations even and easy. Call light and bedside table in reach. Denies any needs at this time. Chair alarm on.

## 2020-01-21 NOTE — PROGRESS NOTES
Pt given written and verbal discharge instructions. Pt indicated understanding of home medication and care instructions. Family at bedside, family also indicates understanding. Paper prescriptions given to pt to take to preferred pharmacy. Pt packed own belongings. Pt taken down to family car via wheelchair.

## 2020-01-21 NOTE — PROGRESS NOTES
UB except for back which was max assist. The pt was able to bath to feet when sitting in the chair wieh SBA- CGA. The pt was SBA-CGA for donning and doffing socks. The pt required min assist to don gown. The pts Spo2 was 98%. GOALS  To be met in 3 Visits:  1). Bed to toilet/BSC: CGA     To be met in 5 Visits:  1). Supine to Sit: Supervision  2). Upper Body Bathing:  MIN while seated- goal met 1-21- new goal - supervision  3). Lower Body Bathing: Mod A while seated- goal met 1-21 - new goal- supervision   4). Upper Body Dressing: SBA  5). Lower Body Dressing: Mod A- goal met - 1-21 new goal - supervision   6).  Pt to janae UE exs x 15 reps      Plan: cont with DEB [place] OTR/L [id number]      If patient discharges from this facility prior to next visit, this note will serve as the Discharge Summary

## 2020-01-21 NOTE — DISCHARGE SUMMARY
treatment planned     CAD  - resumed home medications  - denies chest pain.      DVT Prophylaxis: Lovenox    Procedures (Please Review Full Report for Details)  N/A    Consults    General surgery      Physical Exam at Discharge:    /72   Pulse 81   Temp 98.4 °F (36.9 °C) (Oral)   Resp 16   Ht 5' 5\" (1.651 m)   Wt 152 lb (68.9 kg)   SpO2 99%   BMI 25.29 kg/m²     General: thin elderly male up in bed,  Awake, alert and oriented. Appears to be not in any distress  Mucous Membranes:  Pink , anicteric  Neck: No JVD, no carotid bruit, no thyromegaly  Chest:  Clear to auscultation bilaterally, no added sounds  Cardiovascular:  RRR S1S2 heard, no murmurs or gallops  Abdomen:  Soft, not distended, non tender, no organomegaly, BS present  Extremities: No edema or cyanosis.  Distal pulses well felt  Neurological : grossly normal      CBC:   Recent Labs     01/19/20  0532 01/20/20  0531 01/21/20  0523   WBC 9.9 5.9 5.1   HGB 12.1* 10.1* 10.0*   HCT 39.0* 32.9* 32.2*   MCV 79.8* 80.1 78.7*    201 197     BMP:   Recent Labs     01/19/20  0532 01/20/20  0531 01/21/20  0523    135* 136   K 3.8 3.6 3.7    103 104   CO2 22 20* 22   PHOS  --  2.3* 3.1   BUN 34* 30* 25*   CREATININE 1.1 0.9 1.0     LIVER PROFILE:   Recent Labs     01/19/20  0532   AST 18   ALT 7*   BILIDIR <0.2   BILITOT 0.3   ALKPHOS 80       U/A:    Lab Results   Component Value Date    NITRITE positive 06/12/2019    COLORU Yellow 01/18/2020    WBCUA 10-20 01/18/2020    RBCUA 3-5 01/18/2020    BACTERIA 3+ 01/18/2020    CLARITYU CLOUDY 01/18/2020    SPECGRAV 1.020 01/18/2020    LEUKOCYTESUR LARGE 01/18/2020    BLOODU SMALL 01/18/2020    GLUCOSEU Negative 01/18/2020    AMORPHOUS Rare 01/18/2020         CULTURES  Blood Cultures no growth to date    Susceptibility     Proteus mirabilis (1)     Antibiotic Interpretation EILEEN Status    ampicillin Sensitive <=8 mcg/mL     cefepime Sensitive <=2 mcg/mL     cefotaxime Sensitive <=2 mcg/mL cefTRIAXone Sensitive <=1 mcg/mL     cefuroxime Sensitive <=4 mcg/mL     ciprofloxacin Sensitive <=1 mcg/mL     ertapenem Sensitive <=0.5 mcg/mL     gentamicin Sensitive <=4 mcg/mL     meropenem Sensitive <=1 mcg/mL     nitrofurantoin Resistant >64 mcg/mL     piperacillin-tazobactam Sensitive <=16 mcg/mL     trimethoprim-sulfamethoxazole Sensitive <=2/38 mcg/mL           RADIOLOGY  XR ABDOMEN (KUB) (SINGLE AP VIEW)   Final Result   No air-filled dilated loops of bowel. Moderate to large amount of stool throughout the colon. CT ABDOMEN PELVIS WO CONTRAST Additional Contrast? None   Final Result   1. Findings suggest small bowel obstruction with the transition point in the   terminal ileum where wall thickening is suspected. 2. Diverticulosis without scan evidence for diverticulitis. 3. Bladder wall thickening may represent chronic outlet obstruction or   cystitis.                Discharge Medications     Medication List      START taking these medications    ciprofloxacin 250 MG tablet  Commonly known as:  CIPRO  Take 1 tablet by mouth 2 times daily for 6 days     lactobacillus capsule  Take 1 capsule by mouth 2 times daily     magnesium hydroxide 400 MG/5ML suspension  Commonly known as:  MILK OF MAGNESIA  Take 30 mLs by mouth daily as needed for Constipation     polyethylene glycol packet  Commonly known as:  GLYCOLAX  Take 17 g by mouth daily  Start taking on:  January 22, 2020        CONTINUE taking these medications    aspirin 81 MG tablet     ferrous sulfate 325 (65 Fe) MG tablet     glimepiride 2 MG tablet  Commonly known as:  AMARYL  TAKE 1 TABLET TWICE DAILY     [employer] Elite Misc  Use for ambulation     isosorbide dinitrate 10 MG tablet  Commonly known as:  ISORDIL  TAKE 1 TABLET THREE TIMES DAILY     rOPINIRole 1 MG tablet  Commonly known as:  REQUIP  TAKE ONE TABLET AT BEDTIME        STOP taking these medications    triamterene-hydrochlorothiazide 37.5-25 MG per

## 2020-01-21 NOTE — CARE COORDINATION
[address] Transitions Interview     2020    Patient: Martina Chacon Patient : 1924   MRN: [id number]  Reason for Admission: SBO, UTI  RARS: Readmission Risk Score: 27         Spoke with: Martina Chacon (patient)    Lives in one level home with son and daughter-in-law Kevin Soto. States Joanna Campos helps him and CTN outreach should be made to her. Active at [facility] wound care center. Care transition following. Readmission Risk  Patient Active Problem List   Diagnosis    Hypertension    Benign prostatic hyperplasia    CAD (coronary artery disease)    Type 2 diabetes mellitus without complication (HCC)    Osteoarthrosis    Restless legs syndrome    Renal failure    Neoplasm of uncertain behavior of skin    Hyperlipidemia associated with type 2 diabetes mellitus (HCC)    Chronic kidney disease (CKD), stage III (moderate) (HCC)    Primary osteoarthritis of both knees    Acute encephalopathy    Hypoglycemia    Elevated LFTs    BPH with urinary obstruction    Aden[facility] of prostate ([facility]    SBO (small bowel obstruction) (HCC)    Partial small bowel obstruction (HCC)    Pressure injury of left hip, stage 2 (HCC)    Generalized abdominal pain    Acute cystitis without hematuria    Ileus (HCC)    Hypophosphatemia    Weakness    Severe protein-calorie malnutrition [facility])       Inpatient Assessment  Care Transitions Summary    Care Transitions Inpatient Review  Medication Review  Are you able to afford your medications?:  Yes  How often do you have difficulty taking your medications?:  I always take them as prescribed.   Housing Review  Who do you live with?:  Child  Are you an active caregiver in your home?:  No  Social Support  Do you have a ?:  No  Do you have a [address] [place] [address]?:  No  Durable Medical Equipment  Patient DME:  Agustin montesinos  Functional Review  Ability to seek help/take action for Emergent/Urgent situations i.e. fire, crime, inclement

## 2020-01-22 NOTE — CARE COORDINATION
everything you need to keep you well at home?:  Yes  Are you an active caregiver in your home?:  No  Care Transitions Interventions     Other Therapy Services:  Declined      Other Services:  Declined                                  Follow Up  Future Appointments   Date Time Provider Alexi Chavis   1/29/2020  2:45 PM DONIS Castaneda - SEBAS HANSEN   3/25/2020  2:10 PM MD [facility] None       Diana Prado RN

## 2020-02-03 PROBLEM — S80.811D: Status: RESOLVED | Noted: 2020-01-01 | Resolved: 2020-01-01

## 2020-02-03 PROBLEM — S80.811D: Status: ACTIVE | Noted: 2020-01-01

## 2020-02-03 PROBLEM — L89.222 PRESSURE INJURY OF LEFT HIP, STAGE 2 (HCC): Status: RESOLVED | Noted: 2020-01-01 | Resolved: 2020-01-01

## 2020-02-03 NOTE — PROGRESS NOTES
[facility] Progress Note    Caryle Likes     : 1924    DATE OF VISIT:  2020    Subjective:     Caryle Likes is a 80 y.o. male who has a pressure ulcer located on the left hip. Current complaint of pain in this ulcer? yes. Quality of pain: aching and sharp  Timing: intermittent and decreasing in frequency  Severity: mild  Associated Signs/Symptoms: none  Other significant symptoms or pertinent ulcer history: feeling well, sleeping surface more padded now, no current drainage, no pruritus, no fever. Additional ulcer(s) noted? no. But he had a couple of small right leg abrasions recently, which his family were a little concerned about - his son thinks they might have been from inadvertent trauma from contralateral toe nails -- he's taking him to the [place] [address] for nail care soon. Mr. Donald Hernandez has a past medical history of Adenocarcinoma of prostate ([address] BPH (benign prostatic hypertrophy), CAD (coronary artery disease), Chronic kidney disease (CKD), stage III (moderate) ([address] Cirrhosis of liver ([address] Diabetes mellitus type 2, controlled ([address] Hyperlipidemia, Hyperlipidemia associated with type 2 diabetes mellitus ([address] Hypertension, Neoplasm of uncertain behavior of skin, Osteoarthrosis, Renal failure, Restless legs syndrome, SBO (small bowel obstruction) ([address] and Type II or unspecified type diabetes mellitus without mention of complication, not stated as uncontrolled. He has a past surgical history that includes TURP (2019) and TURP (N/A, 2019). His family history includes Cancer in his mother; Stroke in his father. Mr. Donald Hernandez reports that he has never smoked. He has never used smokeless tobacco. He reports that he does not drink alcohol or use drugs.     His current medication list consists of [employer] Elite, aspirin, ferrous sulfate, glimepiride, isosorbide dinitrate, lactobacillus, magnesium hydroxide, polyethylene glycol, and

## 2020-02-03 NOTE — CARE COORDINATION
[facility] Transitions Follow Up Call    2/3/2020    Patient: Claritza Jiang  Patient : 1924   MRN: [id number]  Reason for Admission: SBO, UTI  Discharge Date: 20 RARS: Readmission Risk Score: 27       Unable to reach patient by phone. Message left stating purpose of call with contact information requesting return call. Care transition outreach complete.          Follow Up  Future Appointments   Date Time Provider Alexi Chavis   3/25/2020  2:10 PM Abner Kim MD [place] [facility] None       Ferdinand Robertson RN

## 2020-07-23 NOTE — TELEPHONE ENCOUNTER
----- Message from Holland León MD sent at 7/23/2020  9:47 AM EDT -----  Contact: dtr jarred- [phone]  He should take some over-the-counter Tylenol for now. He does not need an antibiotic at present but call if he worsens. ----- Message -----  From: Geoff Vanegas  Sent: 7/23/2020   9:22 AM EDT  To: Holland León MD    His dtr. jarred called- said he has had a runny nose for about a wk.  And cough- no fever and no other symptoms- wanted to know if you wanted to call him in an antibiotic due to his age and everything going on  or what you would recommend- [facility] at [phone] appt- [phone] appt- 9-3-20-lr

## 2020-07-25 PROBLEM — J18.9 PNEUMONIA: Status: ACTIVE | Noted: 2020-01-01

## 2020-07-25 NOTE — ED NOTES
Report called to  [facility]. EMS here to transport pt to [place]. Report given to medic.       Janelle Mercedes RN  07/25/20 2510

## 2020-07-25 NOTE — ED PROVIDER NOTES
CHIEF COMPLAINT  Fatigue (Pt brought in by family for c/o fatigue all week and cough during the week that is now diminishing. Denies any pain. Denies fever. )      HISTORY OF PRESENT ILLNESS  Adriana Martinez is a 80 y.o. male presents to the ED with cough, fatigue, feeling tired all week, and cough seem to be getting better, but daughter here with him states that he is just not himself, and getting more short of breath, worse with exertion, cannot even make it across his house without getting significantly dyspneic, no known fevers, no chest pain, no urinary changes, he does wear depends, and is mostly incontinent, denies dysuria/hematuria, denies any bowel changes, mostly has been soft and mushy, brown, no melena or hematochezia, he is not on any blood thinners. He does have a history of heart problems, has not seen a cardiologist in a while, used to see a doctor at [facility], but had retired and has not been in a couple years, daughter here with him helps with the history, patient is very hard of hearing when asked about the bruising on his chest,  patient's daughter states he is always itching, scratching causing this,, no other complaints, modifying factors or associated symptoms. I have reviewed the following from the nursing documentation.     Past Medical History:   Diagnosis Date    Adenocarcinoma of prostate ([address] 12/18/2019    BPH (benign prostatic hypertrophy)     CAD (coronary artery disease)     Chronic kidney disease (CKD), stage III (moderate) (HCC)     Cirrhosis of liver ([address]     Diabetes mellitus type 2, controlled ([address] 7/28/2015    Hyperlipidemia     Hyperlipidemia associated with type 2 diabetes mellitus ([address] 11/30/2015    Hypertension     Neoplasm of uncertain behavior of skin 8/19/2011    Osteoarthrosis     Renal failure     Restless legs syndrome     SBO (small bowel obstruction) ([address] 01/18/2020    SBO vs ileus    Type II or unspecified type diabetes mellitus without mention of complication, not stated as uncontrolled      Past Surgical History:   Procedure Laterality Date    TURP  08/14/2019    TURP N/A 8/14/2019    CYSTOSCOPY TRANSURETHRAL RESECTION PROSTATE, CYSTOLITHOPAXY performed by Rogelio Ruth MD at [facility] OR     Family History   Problem Relation Age of Onset    Cancer Mother         Bone CA    Stroke Father      Social History     Socioeconomic History    Marital status:      Spouse name: Not on file    Number of children: Not on file    Years of education: Not on file    Highest education level: Not on file   Occupational History    Not on file   Social Needs    Financial resource strain: Not on file    Food insecurity     Worry: Not on file     Inability: Not on file    Transportation needs     Medical: Not on file     Non-medical: Not on file   Tobacco Use    Smoking status: Never Smoker    Smokeless tobacco: Never Used   Substance and Sexual Activity    Alcohol use: No    Drug use: No    Sexual activity: Not Currently     Partners: Female   Lifestyle    Physical activity     Days per week: Not on file     Minutes per session: Not on file    Stress: Not on file   Relationships    Social connections     Talks on phone: Not on file     Gets together: Not on file     Attends Mandaeism service: Not on file     Active member of club or organization: Not on file     Attends meetings of clubs or organizations: Not on file     Relationship status: Not on file    Intimate partner violence     Fear of current or ex partner: Not on file     Emotionally abused: Not on file     Physically abused: Not on file     Forced sexual activity: Not on file   Other Topics Concern    Not on file   Social History Narrative    Not on file     No current facility-administered medications for this encounter.       Current Outpatient Medications   Medication Sig Dispense Refill    isosorbide dinitrate (ISORDIL) 10 MG tablet TAKE 1 TABLET BY MOUTH 3 TIMES A DAY 90 tablet 0    triamterene-hydroCHLOROthiazide (DYAZIDE) 37.5-25 MG per capsule TAKE 1 CAPSULE ONCE DAILY 90 capsule 0    glimepiride (AMARYL) 2 MG tablet TAKE 1 TABLET TWICE DAILY 180 tablet 0    rOPINIRole (REQUIP) 1 MG tablet TAKE ONE TABLET AT BEDTIME 30 tablet 2    lactobacillus (CULTURELLE) capsule Take 1 capsule by mouth 2 times daily 30 capsule 0    magnesium hydroxide (MILK OF MAGNESIA) 400 MG/5ML suspension Take 30 mLs by mouth daily as needed for Constipation 355 mL 0    ferrous sulfate 325 (65 Fe) MG tablet Take 325 mg by mouth every other day      Misc. Devices (MARIA ISABEL ROLLING WALKER ELITE) MISC Use for ambulation 1 each 0    aspirin 81 MG tablet Take 1 tablet by mouth daily. 30 tablet      No Known Allergies    REVIEW OF SYSTEMS  10 systems reviewed, pertinent positives per HPI otherwise noted to be negative. PHYSICAL EXAM  /88   Pulse 88   Temp 98.8 °F (37.1 °C) (Oral)   Resp 18   Ht 5' 5\" (1.651 m)   Wt 155 lb (70.3 kg)   SpO2 97%   BMI 25.79 kg/m²   GENERAL APPEARANCE: Awake and alert. Cooperative. No acute distress, appears well for stated age, thin  HEAD: Normocephalic. Atraumatic. EYES: PERRL. EOM's grossly intact. Arcus senilis  ENT: Mucous membranes are moist.   NECK: Supple. No rigidity, thoracic kyphosis with cervical lordosis  HEART: Irregularly irregular, rate in the 80s. Systolic murmur noted  LUNGS: Respirations nonlabored. Lungs are slightly diminished in the bases, but with end expiratory wheezes, no significant rales or rhonchi noted  ABDOMEN: Soft. Non-distended. Non-tender. No guarding or rebound. Normal bowel sounds. Rotund abdomen  EXTREMITIES: No peripheral edema. No calf tenderness, moves all extremities equally. All extremities neurovascularly intact. Mild muscular atrophy  SKIN: Warm and dry. No acute rashes. Pale, multiple areas of ecchymosis over his anterior chest wall, no purpura or petechia  NEUROLOGICAL: Alert and oriented.  No gross facial drooping. Strength 5/5, sensation intact. No truncal ataxia. normal speech    LABS  I have reviewed all labs for this visit.    Results for orders placed or performed during the hospital encounter of 07/24/20   CBC Auto Differential   Result Value Ref Range    WBC 7.6 4.0 - 11.0 K/uL    RBC 3.82 (L) 4.20 - 5.90 M/uL    Hemoglobin 7.8 (L) 13.5 - 17.5 g/dL    Hematocrit 27.0 (L) 40.5 - 52.5 %    MCV 70.9 (L) 80.0 - 100.0 fL    MCH 20.6 (L) 26.0 - 34.0 pg    MCHC 29.0 (L) 31.0 - 36.0 g/dL    RDW 18.8 (H) 12.4 - 15.4 %    Platelets 346 098 - 817 K/uL    MPV 7.8 5.0 - 10.5 fL    Neutrophils % 73.0 %    Lymphocytes % 15.8 %    Monocytes % 8.5 %    Eosinophils % 1.2 %    Basophils % 1.5 %    Neutrophils Absolute 5.6 1.7 - 7.7 K/uL    Lymphocytes Absolute 1.2 1.0 - 5.1 K/uL    Monocytes Absolute 0.7 0.0 - 1.3 K/uL    Eosinophils Absolute 0.1 0.0 - 0.6 K/uL    Basophils Absolute 0.1 0.0 - 0.2 K/uL   Comprehensive Metabolic Panel w/ Reflex to MG   Result Value Ref Range    Sodium 139 136 - 145 mmol/L    Potassium reflex Magnesium 4.3 3.5 - 5.1 mmol/L    Chloride 107 99 - 110 mmol/L    CO2 25 21 - 32 mmol/L    Anion Gap 7 3 - 16    Glucose 94 70 - 99 mg/dL    BUN 25 (H) 7 - 20 mg/dL    CREATININE 1.2 0.8 - 1.3 mg/dL    GFR Non- 56 (A) >60    GFR African American >60 >60    Calcium 8.9 8.3 - 10.6 mg/dL    Total Protein 6.6 6.4 - 8.2 g/dL    Alb 3.8 3.4 - 5.0 g/dL    Albumin/Globulin Ratio 1.4 1.1 - 2.2    Total Bilirubin 0.6 0.0 - 1.0 mg/dL    Alkaline Phosphatase 118 40 - 129 U/L    ALT 9 (L) 10 - 40 U/L    AST 22 15 - 37 U/L    Globulin 2.8 g/dL   Urinalysis Reflex to Culture    Specimen: Urine, clean catch   Result Value Ref Range    Color, UA Yellow Straw/Yellow    Clarity, UA Clear Clear    Glucose, Ur Negative Negative mg/dL    Bilirubin Urine Negative Negative    Ketones, Urine Negative Negative mg/dL    Specific Gravity, UA >=1.030 1.005 - 1.030    Blood, Urine Negative Negative    pH, UA 5.5 5.0 - 8.0    Protein, UA Negative Negative mg/dL    Urobilinogen, Urine 1.0 <2.0 E.U./dL    Nitrite, Urine Negative Negative    Leukocyte Esterase, Urine Negative Negative    Microscopic Examination Not Indicated     Urine Type NotGiven     Urine Reflex to Culture Not Indicated    Lactate, Sepsis   Result Value Ref Range    Lactic Acid, Sepsis 1.5 0.4 - 1.9 mmol/L   Magnesium   Result Value Ref Range    Magnesium 2.20 1.80 - 2.40 mg/dL   Blood Occult Stool Diagnostic   Result Value Ref Range    Occult Blood Diagnostic Result: Negative  Normal range: Negative      Troponin   Result Value Ref Range    Troponin 0.04 (H) <0.01 ng/mL   Brain Natriuretic Peptide   Result Value Ref Range    Pro-BNP 2,256 (H) 0 - 449 pg/mL   Troponin   Result Value Ref Range    Troponin 0.04 (H) <0.01 ng/mL   EKG 12 Lead   Result Value Ref Range    Ventricular Rate 92 BPM    Atrial Rate 52 BPM    QRS Duration 76 ms    Q-T Interval 392 ms    QTc Calculation (Bazett) 484 ms    R Axis 40 degrees    T Axis -35 degrees    Diagnosis       Atrial fibrillation with premature ventricular or aberrantly conducted complexesNonspecific T wave abnormality , probably digitalis effectProlonged QTAbnormal ECGWhen compared with ECG of 18-JAN-2020 01:01,Atrial fibrillation has replaced Sinus rhythm       RADIOLOGY  Xr Chest Portable    Result Date: 7/24/2020  EXAMINATION: ONE XRAY VIEW OF THE CHEST 7/24/2020 9:13 pm COMPARISON: September 22, 2019 HISTORY: ORDERING SYSTEM PROVIDED HISTORY: cough TECHNOLOGIST PROVIDED HISTORY: Reason for exam:->cough Reason for Exam: fatigue Acuity: Acute Type of Exam: Initial FINDINGS: The cardiomediastinal silhouette is moderately enlarged. Moderate vascular congestive changes are present. There are small bilateral pleural effusions with bibasilar airspace disease. This is more prominent on the left. No pneumothorax. Upper lung fields are clear. Cardiomegaly with small bilateral pleural effusions, left side greater than right. Bibasilar airspace disease which could represent atelectasis or pneumonia. ED COURSE/MDM  Patient seen and evaluated. Old records reviewed. Labs and imaging reviewed and results discussed with patient. 70-year-old male with fatigue, cough, was initially suspicious for infectious process, pneumonia, especially after the radiology read, and he did not appear clinically fluid overloaded, no lower extremity edema, but he is having dyspnea on exertion, though his O2 sats remained 95% and above during walking pulse ox with a walker in the ED, he did have a slight end expiratory wheeze, given a DuoNeb which seemed to help, had started community-acquired pneumonia antibiotics with Rocephin and azithromycin, but I do not feel this is sepsis, less only a 500 cc bolus, he does have an elevated BNP as well, normal white count, normal lactate, I am more suspicious of cardiac origin of his sirs criteria respiratory rate and heart rate, he does have known heart disease, not having any chest pain, but he did have elevated troponin, given aspirin, stayed stable, no ischemic change on EKG, patient/daughter stated his cardiac murmur was not new, however his A. fib is new, not significant RVR, rate stayed from , blood pressure remained stable, afebrile, he is also anemic, last hemoglobins in our system were around 10, today is 7.8, no current suspicion for GI bleeding, Hemoccult negative, no melena or hematochezia, he has been off of his iron supplementation, and the anemia is likely why he is more fatigued, discussed with plan to admit with patient and daughter, and they verbalized understanding, I spoke to Dr. Jose Celis who agreed to accept for admission to [facility]. CLINICAL IMPRESSION  1. Generalized weakness    2. SAN (dyspnea on exertion)    3. Community acquired pneumonia, unspecified laterality    4. History of atherosclerotic heart disease    5. Bilateral pleural effusion    6. Elevated troponin    7.  Iron

## 2020-07-25 NOTE — PROGRESS NOTES
Spoke with Dr. Chuy Byrd via telephone regarding pt. Per MD, pt can eat. Call placed to speech therapy to see when they could come and assess pt to see what type of diet will be best for pt. They said that they would be here within about an hour. Pt made aware.

## 2020-07-25 NOTE — CONSULTS
[address] [facility]  [phone]        Reason for Consultation/Chief Complaint: \"I have been having cough and weakness. \"    Consulted for atrial fibrillation; HPI per chart and nursing due to r/o Covid isolation    History of Present Illness:  India Pedraza is a 80 y.o. patient who presented to [facility] 7/24/20 with c/o weakness, fatigue, and SOB. Prior saw cards Dr. Phil Biswas Dec. 2018. He has PMH of reported CAD (no specific details available), DM, HTN, HLD, prostate cancer, and CRI. Most recent ECHO from 4/26/18 shows EF=50-55%; mild LVH; mild AI/MR; mild pulm HTN. Now presents with reported fatigue, cough, and SAN. Admit EKG showed atrial fibrillation 92bpm; nonspecific ST change; PVC (note January 2020 EKG sinus tach). Note CXR with CM; small bilateral pleural effusions L>R; basilar ASD. Note Jesus 0.04 x 2; BLT=1141; BUN/Cr=23/1.3; Mg+ 2.2; H/H=7.4/26.3 (1/20=10/32). I have been asked to provide consultation regarding further management and testing. Past Medical History:   has a past medical history of Adenocarcinoma of prostate ([address] BPH (benign prostatic hypertrophy), CAD (coronary artery disease), Chronic kidney disease (CKD), stage III (moderate) ([address] Cirrhosis of liver ([address] Diabetes mellitus type 2, controlled ([address] Hyperlipidemia, Hyperlipidemia associated with type 2 diabetes mellitus ([address] Hypertension, Neoplasm of uncertain behavior of skin, Osteoarthrosis, Renal failure, Restless legs syndrome, SBO (small bowel obstruction) ([address] and Type II or unspecified type diabetes mellitus without mention of complication, not stated as uncontrolled. Surgical History:   has a past surgical history that includes TURP (08/14/2019) and TURP (N/A, 8/14/2019). Social History:   reports that he has never smoked. He has never used smokeless tobacco. He reports that he does not drink alcohol or use drugs.      Family History:  family history includes Cancer in his mother; Stroke in his father. Home Medications:  Were reviewed and are listed in nursing record. and/or listed below  Prior to Admission medications    Medication Sig Start Date End Date Taking? Authorizing Provider   isosorbide dinitrate (ISORDIL) 10 MG tablet TAKE 1 TABLET BY MOUTH 3 TIMES A DAY 7/13/20  Yes Josephine Jimenez MD   triamterene-hydroCHLOROthiazide (DYAZIDE) 37.5-25 MG per capsule TAKE 1 CAPSULE ONCE DAILY 7/13/20  Yes Josephine Jimenez MD   glimepiride (AMARYL) 2 MG tablet TAKE 1 TABLET TWICE DAILY 6/5/20  Yes Josephine Jimenez MD   rOPINIRole (REQUIP) 1 MG tablet TAKE ONE TABLET AT BEDTIME 2/4/20  Yes Josephine Jimenez MD   lactobacillus (CULTURELLE) capsule Take 1 capsule by mouth 2 times daily 1/21/20  Yes Brenton De Oliveira MD   magnesium hydroxide (MILK OF MAGNESIA) 400 MG/5ML suspension Take 30 mLs by mouth daily as needed for Constipation 1/21/20  Yes Brenton De Oliveira MD   ferrous sulfate 325 (65 Fe) MG tablet Take 325 mg by mouth every other day   Yes Historical Provider, MD   Misc. Devices (MARIA ISABEL ROLLING WALKER [place]) MISC Use for ambulation 7/15/14  Yes Dennise Horton MD   aspirin 81 MG tablet Take 1 tablet by mouth daily. 3/5/13  Yes Chris Mata MD        Allergies:  Patient has no known allergies.      Review of Systems:   Deferred due to r/o Covid isolation; conservation PPE    Physical Examination:  Deferred due to R/O Covid-19 infxn, isolation, and conservation of PPE    Vitals:    07/25/20 0444   BP: (!) 108/59   Pulse: 99   Resp: 20   Temp: 97.1 °F (36.2 °C)   SpO2: 100%    Weight: 156 lb 12 oz (71.1 kg)         Labs  CBC:   Lab Results   Component Value Date    WBC 7.6 07/24/2020    RBC 3.82 07/24/2020    HGB 7.8 07/24/2020    HCT 27.0 07/24/2020    MCV 70.9 07/24/2020    RDW 18.8 07/24/2020     07/24/2020     CMP:    Lab Results   Component Value Date     07/25/2020    K 4.4 07/25/2020     07/25/2020    CO2 23 07/25/2020    BUN 23 ACE-I and BB if LVEF is depressed. Patient Active Problem List   Diagnosis    Hypertension    Benign prostatic hyperplasia    CAD (coronary artery disease)    Type 2 diabetes mellitus without complication ([address]    Osteoarthrosis    Restless legs syndrome    Renal failure    Neoplasm of uncertain behavior of skin    Hyperlipidemia associated with type 2 diabetes mellitus (HCC)    Chronic kidney disease (CKD), stage III (moderate) (HCC)    Primary osteoarthritis of both knees    Acute encephalopathy    Hypoglycemia    Elevated LFTs    BPH with urinary obstruction    [address]en[address] of [address]    Acute cystitis without hematuria    Hypophosphatemia    Weakness    Severe protein-calorie malnutrition (HCC)    Constipation    Pneumonia       Thank you for allowing to us to participate in the care or [employer]. Further evaluation will be based upon the patient's clinical course and testing results.

## 2020-07-25 NOTE — CONSULTS
Patient is being seen at the request of Dr. Jhony Jin for a consultation for jr marmolejo, possible COVID    HISTORY OF PRESENT ILLNESS: 27-year-old male who presented to the emergency department with a one-week history of moderate fatigue and shortness of breath, associated with cough. No fever. He is overall feeling better since he has been admitted. PAST MEDICAL HISTORY:  Past Medical History:   Diagnosis Date    Adenocarcinoma of [address] 12/18/2019    BPH (benign prostatic hypertrophy)     CAD (coronary artery disease)     Chronic kidney disease (CKD), stage III (moderate) (HCC)     Cirrhosis of liver ([facility]     Diabetes mellitus type 2, controlled ([facility] 7/28/2015    Hyperlipidemia     Hyperlipidemia associated with type 2 diabetes mellitus ([facility] [facility] 11/30/2015    Hypertension     Neoplasm of uncertain behavior of skin 8/19/2011    Osteoarthrosis     Renal failure     Restless legs syndrome     SBO (small bowel obstruction) ([facility] 01/18/2020    SBO vs ileus    Type II or unspecified type diabetes mellitus without mention of complication, not stated as uncontrolled      PAST SURGICAL HISTORY:  Past Surgical History:   Procedure Laterality Date    TURP  08/14/2019    TURP N/A 8/14/2019    CYSTOSCOPY TRANSURETHRAL RESECTION PROSTATE, CYSTOLITHOPAXY performed by Arlene Solano MD at [address]:  family history includes Cancer in his mother; Stroke in his father. SOCIAL HISTORY:   reports that he has never smoked.  He has never used smokeless tobacco.    Scheduled Meds:   aspirin  81 mg Oral Daily    ferrous sulfate  325 mg Oral Every Other Day    isosorbide dinitrate  10 mg Oral TID    rOPINIRole  1 mg Oral Nightly    sodium chloride flush  10 mL Intravenous 2 times per day    enoxaparin  1 mg/kg Subcutaneous BID    insulin lispro  0-6 Units Subcutaneous TID WC    insulin lispro  0-3 Units Subcutaneous Nightly    cefTRIAXone (ROCEPHIN) IV  1 g Intravenous Q24H    azithromycin  500 mg Intravenous Q24H     Continuous Infusions:   dextrose      sodium chloride 75 mL/hr at 07/25/20 0520     PRN Meds:  sodium chloride flush, acetaminophen **OR** acetaminophen, polyethylene glycol, glucose, dextrose, glucagon (rDNA), dextrose, prochlorperazine, albuterol sulfate HFA **AND** ipratropium **AND** MDI Treatment    ALLERGIES:  Patient has No Known Allergies. REVIEW OF SYSTEMS:  Constitutional: Negative for fever  HENT: Negative for sore throat  Eyes: Negative for redness   Respiratory: + for dyspnea, cough  Cardiovascular: Negative for chest pain  Gastrointestinal: Negative for vomiting, diarrhea   Genitourinary: Negative for hematuria   Musculoskeletal: Negative for arthralgias   Skin: Negative for rash  Neurological: Negative for syncope  Hematological: Negative for adenopathy  Psychiatric/Behavorial: Negative for anxiety    PHYSICAL EXAM:  Blood pressure 110/64, pulse 88, temperature 97.4 °F (36.3 °C), temperature source Oral, resp. rate 19, height 5' 5\" (1.651 m), weight 156 lb 12 oz (71.1 kg), SpO2 99 %.' on RA  Gen: No distress. Eyes: PERRL. No sclera icterus. No conjunctival injection. ENT: No discharge. Pharynx clear. Neck: Trachea midline. No obvious mass. Resp: No accessory muscle use. No crackles. No wheezes. No rhonchi. No dullness on percussion. CV: Regular rate. Regular rhythm. No murmur or rub. No edema. Peripheral pulses are 2+. Capillary refill is less than 3 seconds. GI: Non-tender. Non-distended. No hernia. Skin: Warm and dry. No nodule on exposed extremities. Lymph: No cervical LAD. No supraclavicular LAD. M/S: No cyanosis. No joint deformity. No clubbing. Neuro: Very hard of hearing. awake. Alert. Moves all four extremities. Psych: Oriented x 3. No anxiety.      LABS:  CBC:   Recent Labs     07/24/20 2050 07/25/20  0536   WBC 7.6 8.6   HGB 7.8* 7.4*   HCT 27.0* 26.3*   MCV 70.9* 71.0*    301     BMP:   Recent Labs     07/24/20 2050 07/25/20  0536    142   K 4.3 4.4    106   CO2 25 23   BUN 25* 23*   CREATININE 1.2 1.3     LIVER PROFILE:   Recent Labs     07/24/20 2050 07/25/20  0536   AST 22 23   ALT 9* 9*   BILITOT 0.6 0.5   ALKPHOS 118 116     PT/INR: No results for input(s): PROTIME, INR in the last 72 hours. APTT: No results for input(s): APTT in the last 72 hours. UA:  Recent Labs     07/25/20  0120   COLORU Yellow   PHUR 5.5   CLARITYU Clear   SPECGRAV >=1.030   LEUKOCYTESUR Negative   UROBILINOGEN 1.0   BILIRUBINUR Negative   BLOODU Negative   GLUCOSEU Negative     No results for input(s): PHART, JGJ1BER, PO2ART in the last 72 hours.     Chest imaging was reviewed by me and showed   CXR 7/24/2020 bilateral left greater than right lower lobe infiltrates with left effusion    ASSESSMENT:  · Community acquired pneumonia   · Atrial fibrillation    · Acute on chronic anemia   · Chronic Kidney Disease     PLAN:  · - COVID-19 isolation, droplet plus, test pending  -   · CTX and Azithromycin D#2

## 2020-07-25 NOTE — PROGRESS NOTES
Speech Language Pathology  Facility/Department: [facility]   CLINICAL BEDSIDE SWALLOW EVALUATION    Recommended Diet and Intervention  Diet Solids Recommendation: Dysphagia Soft and Bite-Sized (Dysphagia III)  Liquid Consistency Recommendation: Thin  Recommended Form of Meds: Whole with water(1-2 at a time)    NAME: Jeromy Redd  : 1924  MRN: [id number]    ADMISSION DATE: 2020  ADMITTING DIAGNOSIS: has Hypertension; Benign prostatic hyperplasia; CAD (coronary artery disease); Type 2 diabetes mellitus without complication ([address] Osteoarthrosis; Restless legs syndrome; Renal failure; Neoplasm of uncertain behavior of skin; Hyperlipidemia associated with type 2 diabetes mellitus ([address] Chronic kidney disease (CKD), stage III (moderate) ([address] Primary osteoarthritis of both knees; Acute encephalopathy; Hypoglycemia; Elevated LFTs; BPH with urinary obstruction; Adenocarcinoma of prostate ([address] Acute cystitis without hematuria; Hypophosphatemia; Weakness; Severe protein-calorie malnutrition ([address] Constipation; Pneumonia; New onset a-fib [facility]); and Elevated troponin on their problem list.  ONSET DATE:  20    Recent Chest Xray/CT of Chest: (20 )    Impression    Cardiomegaly with small bilateral pleural effusions, left side greater than    right.         Bibasilar airspace disease which could represent atelectasis or pneumonia.           Date of Eval: 2020  Evaluating Therapist: Luis Fan    Current Diet level:  Current Diet : NPO(except sips with meds or ice chips)    Primary Complaint: Pt denies dysphagia, odynophagia, or globus sensation. CXR/cough concerning for aspiration.     Pain: None reported    Reason for Referral  Jeromy Redd was referred for a bedside swallow evaluation to assess the efficiency of his swallow function, identify signs and symptoms of aspiration and make recommendations regarding safe dietary consistencies, effective compensatory strategies, and safe eating environment. Impression  Dysphagia Diagnosis: Mild oral stage dysphagia;Mild pharyngeal stage dysphagia; Suspected needs further assessment  Dysphagia Impression : Bedside swallow evaluation completed. Pt very alert and hungry. Oral mechanism examination revealing mild reduced lingual strength. Pt missing most of his teeth. He states that he is able to chew anything/everything without his teeth. Thin liquids x 6 oz by cup/straw, puree, dental soft, and regular texture observed. Pt able to feed self independently. Mild slowed mastication but effective. Independent oral clearance in b/w bites. Swallow appearing minimally delayed upon palpation, this did not appear to affect the safety of the swallow. Clear vocal quality throughout. 02 >93% throughout. RR stable. No clinical s/s penetration or aspiration. Dysphagia Outcome Severity Scale: Level 5: Mild dysphagia- Distant supervision. May need one diet consistency restricted     Treatment Plan  Requires SLP Intervention: Yes  Duration/Frequency of Treatment: 2-3x/wk x 7 days  D/C Recommendations: To be determined       Recommended Diet and Intervention  Diet Solids Recommendation: Dysphagia Soft and Bite-Sized (Dysphagia III)  Liquid Consistency Recommendation: Thin  Recommended Form of Meds: Whole with water(1-2 at a time)  Recommendations: Dysphagia treatment  Therapeutic Interventions: Diet tolerance monitoring;Oral care; Patient/Family education    Compensatory Swallowing Strategies  Compensatory Swallowing Strategies: Alternate solids and liquids;Eat/Feed slowly;Upright as possible for all oral intake;Remain upright for 30-45 minutes after meals;Small bites/sips    Treatment/Goals  Short-term Goals  Timeframe for Short-term Goals: 7 days (8/1/20)  Long-term Goals  Timeframe for Long-term Goals: 7 days (8/1/20)  Dysphagia Goals: The patient will tolerate recommended diet without observed clinical signs of aspiration; The patient will recall and perform compensatory strategies, with no cues. ;The patient will tolerate regular consistency solids 10/10. General  Chart Reviewed: Yes  Comments: Pt is a 81 y/o admitted on 7/24 with PNA and new onset SOB. COVID R/O. PMhx includes adenocarcinoma prostate (12/18/19), CKD 3, DM 2, HTN. No h/o SLP/dysphagia per chart review. Behavior/Cognition: Alert; Cooperative;Pleasant mood  Respiratory Status: Room air  Communication Observation: Functional  Follows Directions: Simple  Dentition: Adequate; Some missing teeth(edentulous on the bottom, missing all molars)  Patient Positioning: (Upright on EOB)  Baseline Vocal Quality: Normal  Volitional Cough: (Did not elicit)  Consistencies Administered: Reg solid; Dysphagia Minced and Moist (Dysphagia II); Dysphagia Pureed (Dysphagia I); Thin - cup; Thin - straw           Vision/Hearing  Hearing  Hearing: Exceptions to [facility]  Hearing Exceptions: Hard of hearing/hearing concerns    Oral Motor Deficits  Oral/Motor  Oral Motor: [facility]  Labial Strength: Reduced    Oral Phase Dysfunction  Oral Phase  Oral Phase: WFL     Indicators of Pharyngeal Phase Dysfunction   Pharyngeal Phase  Pharyngeal Phase: WFL    Prognosis  Prognosis  Prognosis for safe diet advancement: good  Individuals consulted  Consulted and agree with results and recommendations: Patient;RN    Education  Patient Education: Educated pt on the role of SLP, POC, and goals.   Patient Education Response: Verbalizes understanding;Needs reinforcement             Therapy Time  SLP Individual Minutes  Time In: 5519  Time Out: [address]  Minutes: [address] [place], [place]  7/25/2020 11:04 AM

## 2020-07-25 NOTE — ED NOTES
When starting IVF, pt c/o R AC IV site burning. Site easy to flush but swollen at site with flush. Medications and IVF switched to L AC IV.      Damien Martinez RN  07/24/20 6131

## 2020-07-25 NOTE — PLAN OF CARE
Problem: Falls - Risk of:  Goal: Will remain free from falls  Description: Will remain free from falls  7/25/2020 1901 by Crys Tompkins RN  Outcome: Ongoing  7/25/2020 0541 by Guy Salomon RN  Outcome: Ongoing  Goal: Absence of physical injury  Description: Absence of physical injury  7/25/2020 1901 by Crys Tompkins RN  Outcome: Ongoing  7/25/2020 0541 by Guy Salomon RN  Outcome: Ongoing     Problem: Skin Integrity:  Goal: Will show no infection signs and symptoms  Description: Will show no infection signs and symptoms  7/25/2020 0541 by Guy Salomon RN  Outcome: Ongoing  Goal: Absence of new skin breakdown  Description: Absence of new skin breakdown  7/25/2020 0541 by Guy Salomon RN  Outcome: Ongoing     Problem: Discharge Planning:  Goal: Discharged to appropriate level of care  Description: Discharged to appropriate level of care  7/25/2020 1901 by Crys Tompkins RN  Outcome: Ongoing  7/25/2020 0541 by Guy Salomon RN  Outcome: Ongoing  Goal: Participates in care planning  Description: Participates in care planning  7/25/2020 1901 by Crys Tompkins RN  Outcome: Ongoing  7/25/2020 0541 by Guy Salomon RN  Outcome: Ongoing     Problem: Airway Clearance - Ineffective:  Goal: Clear lung sounds  Description: Clear lung sounds  7/25/2020 0541 by Guy Salomon RN  Outcome: Ongoing  Goal: Ability to maintain a clear airway will improve  Description: Ability to maintain a clear airway will improve  7/25/2020 0541 by Guy Salomon RN  Outcome: Ongoing     Problem: Fluid Volume - Deficit:  Goal: Achieves intake and output within specified parameters  Description: Achieves intake and output within specified parameters  7/25/2020 0541 by Guy Salomon RN  Outcome: Ongoing     Problem: Gas Exchange - Impaired:  Goal: Levels of oxygenation will improve  Description: Levels of oxygenation will improve  7/25/2020 1901 by Crys Tompkins RN  Outcome: Ongoing  7/25/2020 0541 by Guy Salomon

## 2020-07-25 NOTE — PLAN OF CARE
Problem: Falls - Risk of:  Goal: Will remain free from falls  Description: Will remain free from falls  Outcome: Ongoing  Goal: Absence of physical injury  Description: Absence of physical injury  Outcome: Ongoing     Problem: Skin Integrity:  Goal: Will show no infection signs and symptoms  Description: Will show no infection signs and symptoms  Outcome: Ongoing  Goal: Absence of new skin breakdown  Description: Absence of new skin breakdown  Outcome: Ongoing     Problem: Discharge Planning:  Goal: Discharged to appropriate level of care  Description: Discharged to appropriate level of care  Outcome: Ongoing  Goal: Participates in care planning  Description: Participates in care planning  Outcome: Ongoing     Problem: Airway Clearance - Ineffective:  Goal: Clear lung sounds  Description: Clear lung sounds  Outcome: Ongoing  Goal: Ability to maintain a clear airway will improve  Description: Ability to maintain a clear airway will improve  Outcome: Ongoing     Problem: Fluid Volume - Deficit:  Goal: Achieves intake and output within specified parameters  Description: Achieves intake and output within specified parameters  Outcome: Ongoing     Problem: Gas Exchange - Impaired:  Goal: Levels of oxygenation will improve  Description: Levels of oxygenation will improve  Outcome: Ongoing     Problem: Hyperthermia:  Goal: Ability to maintain a body temperature in the normal range will improve  Description: Ability to maintain a body temperature in the normal range will improve  Outcome: Ongoing     Problem: Tobacco Use:  Goal: Will participate in inpatient tobacco-use cessation counseling  Description: Will participate in inpatient tobacco-use cessation counseling  Outcome: Ongoing

## 2020-07-25 NOTE — PROGRESS NOTES
19   Ht 5' 5\" (1.651 m)   Wt 156 lb 12 oz (71.1 kg)   SpO2 99%   BMI 26.08 kg/m²   SPO2 (COPD values may differ): Greater than or equal to 92% on room air = 0    Peak Flow (asthma only): not applicable = 0    RSI: 5-6 = Q4hr PRN (every four hours as needed) for dyspnea        Plan       Goals: medication delivery, mobilize retained secretions, volume expansion and improve oxygenation    Patient/caregiver was educated on the proper method of use for Respiratory Care Devices:  No: pt prn      Level of patient/caregiver understanding able to:   ? Verbalize understanding   ? Demonstrate understanding       ? Teach back        ? Needs reinforcement       ? No available caregiver               ? Other:     Response to education:  na     Is patient being placed on Home Treatment Regimen? Yes     Does the patient have everything they need prior to discharge? NA     Comments: pt assessed and chart reviewed    Plan of Care: change txs to prn    Electronically signed by Jesi Corral on 7/25/2020 at 8:13 AM    Respiratory Protocol Guidelines     1. Assessment and treatment by Respiratory Therapy will be initiated for medication and therapeutic interventions upon initiation of aerosolized medication. 2. Physician will be contacted for respiratory rate (RR) greater than 35 breaths per minute. Therapy will be held for heart rate (HR) greater than 140 beats per minute, pending direction from physician. 3. Bronchodilators will be administered via Metered Dose Inhaler (MDI) with spacer when the following criteria are met:  a. Alert and cooperative     b. HR < 140 bpm  c. RR < 30 bpm                d. Can demonstrate a 2-3 second inspiratory hold  4. Bronchodilators will be administered via Hand Held Nebulizer [facility]ID[facility] to patients when ANY of the following criteria are met  a. Incognizant or uncooperative          b. Patients treated with HHN at Home        c.  Unable to demonstrate proper use of MDI with spacer     d. RR > 30 bpm 5. Bronchodilators will be delivered via Metered Dose Inhaler (MDI), HHN, Aerogen to intubated patients on mechanical ventilation. 6. Inhalation medication orders will be delivered and/or substituted as outlined below. Aerosolized Medications Ordering and Administration Guidelines:    1. All Medications will be ordered by a physician, and their frequency and/or modality will be adjusted as defined by the patients Respiratory Severity Index (RSI) score. 2. If the patient does not have documented COPD, consider discontinuing anticholinergics when RSI is less than 9.  3. If the bronchospasm worsens (increased RSI), then the bronchodilator frequency can be increased to a maximum of every 4 hours. If greater than every 4 hours is required, the physician will be contacted. 4. If the bronchospasm improves, the frequency of the bronchodilator can be decreased, based on the patient's RSI, but not less than home treatment regimen frequency. 5. Bronchodilator(s) will be discontinued if patient has a RSI less than 9 and has received no scheduled or as needed treatment for 72  Hrs. Patients Ordered on a Mucolytic Agent:    1. Must always be administered with a bronchodilator. 2. Discontinue if patient experiences worsened bronchospasm, or secretions have lessened to the point that the patient is able to clear them with a cough. Anti-inflammatory and Combination Medications:    1. If the patient lacks prior history of lung disease, is not using inhaled anti-inflammatory medication at home, and lacks wheezing by examination or by history for at least 24 hours, contact physician for possible discontinuation.

## 2020-07-25 NOTE — PROGRESS NOTES
4 Eyes Skin Assessment     The patient is being assess for   Admission    I agree that 2 RN's have performed a thorough Head to Toe Skin Assessment on the patient. ALL assessment sites listed below have been assessed. Areas assessed by both nurses:   [x]   Head, Face, and Ears   [x]   Shoulders, Back, and Chest, Abdomen  [x]   Arms, Elbows, and Hands   [x]   Coccyx, Sacrum, and Ischium  [x]   Legs, Feet, and Heels        Pt has scattered bruising, especially on the front of his chest. Trace swelling to BLE, trace swelling to R arm but not left. Coccyx pink and blanchable, preventative mepilex in place, no wounds noted. **SHARE this note so that the co-signing nurse is able to place an eSignature**    Co-signer eSignature: Electronically signed by Keo Marrufo RN on 7/25/20 at 6:04 AM EDT    Does the Patient have Skin Breakdown?   No          Jordin Prevention initiated:  Yes   Wound Care Orders initiated:  No      WOC nurse consulted for Pressure Injury (Stage 3,4, Unstageable, DTI, NWPT, Complex wounds)and New or Established Ostomies:  No      Primary Nurse eSignature: Electronically signed by Laci Reyes RN on 7/25/20 at 5:18 AM EDT

## 2020-07-25 NOTE — FLOWSHEET NOTE
07/25/20 0800   Vital Signs   Temp 97.4 °F (36.3 °C)   Temp Source Oral   Pulse 88   Heart Rate Source Monitor   Resp 19   /64   BP Location Right upper arm   BP Upper/Lower Upper   Patient Position Sitting  (edge of bed )   Level of Consciousness 0   MEWS Score 1   Oxygen Therapy   SpO2 99 %   O2 Device None (Room air)   AM assessment completed, see flow sheet. Pt is alert and oriented but does get forgetful. Vital signs are WNL. Respirations are even & easy at rest.  Did have noted dyspnea and started to wheeze after exertion. No complaints voiced. Pt with noted coughing with med administration. Took pills whole with water. Pt denies needs at this time. SR up x 2, and bed in low position. Call light is within reach. Bed alarm in place.

## 2020-07-25 NOTE — PROGRESS NOTES
Brief progress note. See H&P done by nocturnist.      Presented with c/o fatigue, cough, SAN. Admitted for pneumonia, A-fib RVR, anemia, and COVID rule out. Discussed with nurse. ASSESSMENT/PLAN:  #  Pneumonia, bibasilar, IV ceftriaxone and azithro D#1. Chk resp cx. F/u bld cx. Does not meet SIRS/qSOFA. COVID IS a consideration, f/u COVID labs and droplet + precautions. pulmonology consulted. #  A fib w/ mild RVR (rates up to the 100's), new onset. Cardiology consulted, monitor on tele, serial trops (0.04, 0.03). Full dose Lovenox. HR controlled. Workup anemia, then consider AC. Check Echo    #  Microcytic, hypochromic anemia, acute on chronic, HGB 7.8, last was 10.1 in Jan 2020. Guaiac was negative at [facility]. He is supposed to be on Fe supp but has not been taking recently. 7.4 today. #  Indeterminate troponin, 0.04, tele and serial trops 0.04, 0.03. Denies CP. Check Echo    #  Clinical dehydration, IVF. Monitor for vol OL. #  Prolonged QTc, 484 ms, avoid QT prolonging agents as able. #  HTN, controlled, cont home regimen. #  CKD III, Cr stable. #  Hx CAD, cont ASA.     DVT Prophylaxis: Full dose Lovenox    Diet: Diet dysphagia soft and bite-sized; carb control   Code Status: Full Code   Dispo - Admit to inpatient PCU level of care but placed on 2W w/ tele 2/2 COVID r/o.      Dorian Dunn FNP-C  7/25/2020

## 2020-07-25 NOTE — PROGRESS NOTES
Occupational Therapy/Physical Therapy Attempt    Unit: 2 [place]   Date:  7/25/2020  Patient Name:    Nilay Padron  Admitting diagnosis:  Pneumonia [J18.9]  Admit Date:  7/24/2020    Attempt @ 740: Hold. Pt with COVID lab pending. Will hold at this time and f/u with MD following results of COVID test, per therapy protocol. Thank you.      Ramona Lemus, MOT, OTR/L   [id number]  Salvatore Muñoz, PT #[id number]    No Charge

## 2020-07-25 NOTE — H&P
HARD OF HEARING. HEENT:  NC/AT,EOMI, semi dry MM, no erythema/exudates or visible masses. CVS:  Normal S1,S2.  ii. Systolic murmur noted. Without G/R.   LUNG:   Faint exp  Wheezes, diminished at bases. No appreciable rales or rhonchi  ABD:  Soft, ND/NT, BS+ x4. Without G/R.  EXT: 2+ pulses, no c/c.  +edema BLE. R hand is more swollen than L, non-tender. Brisk cap refill. PSY:  Thought process intact, affect appropriate. KERRI:  CN III-XII intact (except hard of hearing), moves all 4 spontaneously, sensory grossly intact. SKIN: No rash or lesions on visible skin. Chart review shows recent radiographs:  Xr Chest Portable    Result Date: 7/24/2020  EXAMINATION: ONE XRAY VIEW OF THE CHEST 7/24/2020 9:13 pm COMPARISON: September 22, 2019 HISTORY: ORDERING SYSTEM PROVIDED HISTORY: cough TECHNOLOGIST PROVIDED HISTORY: Reason for exam:->cough Reason for Exam: fatigue Acuity: Acute Type of Exam: Initial FINDINGS: The cardiomediastinal silhouette is moderately enlarged. Moderate vascular congestive changes are present. There are small bilateral pleural effusions with bibasilar airspace disease. This is more prominent on the left. No pneumothorax. Upper lung fields are clear. Cardiomegaly with small bilateral pleural effusions, left side greater than right. Bibasilar airspace disease which could represent atelectasis or pneumonia.      EKG 12 Lead [[id number]]      Collected: 07/24/20 2101     Updated: 07/24/20 2153      Ventricular Rate  92  BPM     Atrial Rate  52  BPM     QRS Duration  76  ms     Q-T Interval  392  ms     QTc Calculation (Bazett)  484  ms     R Axis  40  degrees     T Axis  -35  degrees     Diagnosis  Atrial fibrillation with premature ventricular or aberrantly conducted complexesNonspecific T wave abnormality , probably digitalis effectProlonged QTAbnormal ECGWhen compared with ECG of 18-JAN-2020 01:01,Atrial fibrillation has replaced Sinus rhythm      CBC:  Recent Labs     07/24/20 2050 WBC 7.6   HGB 7.8*   HCT 27.0*           RENAL  Recent Labs     07/24/20 2050      K 4.3      CO2 25   BUN 25*   CREATININE 1.2   GLUCOSE 94       Hemoglobin a1c:  Lab Results   Component Value Date    LABA1C 5.4 01/18/2020    LABA1C 5.3 12/18/2019    LABA1C 9.4 09/18/2019       LFT'S:  Recent Labs     07/24/20 2050   AST 22   ALT 9*   BILITOT 0.6   ALKPHOS 118     CARDIAC ENZYMES:   Lab Results   Component Value Date    PROBNP 770 (H) 01/18/2020       LACTIC ACID:  Recent Labs     07/24/20 2050   LACTSEPSIS 1.5       PHYSICIAN CERTIFICATION  I certify that Meghann Blue is expected to be hospitalized for 2 midnights based on the following assessment and plan:    ASSESSMENT/PLAN:  1. Pneumonia, bibasilar, IV ceftriaxone and azithro. Chk resp cx. F/u bld cx. Does not meet SIRS/qSOFA. COVID IS a consideration, f/u COVID labs and droplet + precautions. 2. A fib w/ mild RVR (rates up to the 100's), new onset. Cards c/s, tele, serial trops. Full dose Lx.    3. Microcytic, hypochromic anemia, acute on chronic, HGB 7.8, last was 10.1 in Jan 2020. Guaiac was negative at [facility]. He is supposed to be on Fe supp but has not been taking recently. 4. Indeterminate troponin, 0.04, tele and serial trops. Denies CP. 5. Clinical dehydration, IVF. Monitor for vol OL. 6. Prolonged QTc, 484 ms, avoid QT prolonging agents as able. 7. HTN, controlled, cont home regimen. 8. CKD III, Cr stable. 9. Hx CAD, cont ASA. DVT Prophylaxis: Full dose Lx. Diet: NPO  Code Status: Full Code   PT/OT Eval Status: Will order if needed and as patient condition allows  Dispo - Admit to inpatient PCU level of care but placed on 2W w/ tele 2/2 COVID r/o. Lorri Krishna MD    Thank you Van Wilson MD for the opportunity to be involved in this patient's care.  If you have any questions or concerns please feel free to contact me via the [employer] at [phone]. This chart was generated using the [employer] [facility] dictation system. I created this record but it may contain dictation errors given the limitations of this technology.

## 2020-07-25 NOTE — PROGRESS NOTES
Pt to 2W room 231; pt placed on telemetry. VSS. Pt denies further needs at this time, call light within reach, bed alarm in place. Will continue to monitor.   Yusuf Gregg RN

## 2020-07-25 NOTE — PLAN OF CARE
Problem: Nutrition  Intervention: Swallowing evaluation  Note: SLP evaluation completed. All further notes may be found in EMR. Rufino Morton MS-CCC-SLP [id number]   Intervention: Aspiration precautions  Note: SLP evaluation completed. All further notes may be found in EMR.     Rufino Morton MS-CCC-SLP [id number]

## 2020-07-25 NOTE — ED NOTES
Ambulated pt with walker, pt oxygen saturation stayed 99%, pt states he felt weak, pt is laying comfortable in bed now.      Jasmin Mayen  07/25/20 0142

## 2020-07-25 NOTE — PROGRESS NOTES
Sputum cup at bedside, pt aware to provide if able but having non-productive cough at this time. Call light within reach, bed alarm in place. Will continue to monitor.   [facility] RN

## 2020-07-25 NOTE — FLOWSHEET NOTE
07/25/20 0444   Vital Signs   Temp 97.1 °F (36.2 °C)   Temp Source Oral   Pulse 99   Heart Rate Source Monitor   Resp 20   BP (!) 108/59   BP Location Left upper arm   Patient Position Semi fowlers   Level of Consciousness 0   MEWS Score 1   Height and Weight   Height 5' 5\" (1.651 m)   Weight 156 lb 12 oz (71.1 kg)   BSA (Calculated - sq m) 1.81 sq meters   BMI (Calculated) 26.1   Oxygen Therapy   SpO2 100 %   O2 Device None (Room air)   Admission questions completed to best of pt ability at this time, pt unsure of home meds. Pt assessment complete- see flowsheets. Pt has difficulty with urinating at times, pt states he has trouble with this at home also and does wear incontinence underwear at home because of this. Pt ambulates with walker, seems weak at this time. Call light within reach, will continue to monitor. Bed alarm in place.   Yesenia Degroot RN

## 2020-07-26 NOTE — FLOWSHEET NOTE
07/26/20 0734   Vital Signs   Temp 97.2 °F (36.2 °C)   Temp Source Oral   Pulse 91   Heart Rate Source Monitor   Resp 18   /77   BP Location Right upper arm   Patient Position Sitting   Level of Consciousness 0   MEWS Score 1   Patient Currently in Pain No   Oxygen Therapy   SpO2 98 %   O2 Device None (Room air)   AM assessment completed, see flow sheet. Pt is alert and oriented but forgetful at times. Vital signs are WNL. Respirations are even & easy, but does have dyspnea on exertion. No complaints voiced. Pt denies needs at this time. SR up x 2, and bed in low position. Call light is within reach. Droplet plus isolation continues.

## 2020-07-26 NOTE — CARE COORDINATION
Case Management Assessment  Initial Evaluation    Date/Time of Evaluation: 7/26/2020 2:20 PM  Assessment Completed by: Hiram Richey    Patient Name: Diego Moran  YOB: 1924  Diagnosis: Pneumonia [J18.9]  Date / Time: 7/24/2020  8:05 PM  Admission status/Date: 07/24/20  Chart Reviewed: Yes      Patient Interviewed: Yes   Family Interviewed:  No      Hospitalization in the last 30 days:  No    Contacts  : Mary Seip  Relationship to Patient: daughter in law  Phone Number:  [phone]  Alternate Contact:   Noris Douglas   Relationship to Patient:   son  Phone Number:    Met with: Pt and daughter in law via telephone    Current PCP: Dr. Delta Nascimento required for SNF : N        3 night stay required - N- currently waived     ADLS  Support Systems: Family Members  Transportation: family    Meal Preparation: self and family    Housing  Home Environment: house with son  Steps: none  Plans to Return to Present Housing: Yes  Other Identified Issues: none    Home Care Information  Currently active with [address] : No  Type of Home Care Services: None  Passport/Waiver : No  :                      Phone Number:    Passport/Waiver Services: n/a          Durable Medical Equipment   DME Provider: none  Equipment:  Walker_X__Cane___RTS_X__ BSC___Shower Chair_X__  02__ at ____Liter(s)---Uses________HHN___ CPAP___ BiPap___ Hospital Bed___W/C____Other________      Has Home O2 in place on admit:  No    If above answer is No ---is pt presently on O2 during hospitalization:  No  if yes CM to follow for potential DC O2 need  Informed of need to bring portable home O2 tank on day of discharge for nursing to connect prior to leaving:   Not Indicated  Verbalized agreement/Understanding:   Not Indicated    Community Service Affiliation  Dialysis:  No    · Name:  · Location  · Dialysis Schedule:  · Phone:   · Fax:     Outpatient PT/OT: No    Cancer Center: No     CHF Clinic: No     Pulmonary Rehab: No  Pain Clinic: No  Community Mental Health: No    Wound Clinic: No     COVID SCREENING: Yes -  negative       Other: none    The Plan for Transition of Care is related to the following treatment goals: home with family    The Patient and/or patient representativewas provided with a choice of provider and agrees   with the discharge plan. [x] Yes [] No    DISCHARGE PLAN: home with family. Awaiting PT/OT eval for recs. Open to [facility] or additional services if needed. Has been to [facility] in the past, and if rehab is needed the Pt would go back. Follow  Explained Case Management role/services.

## 2020-07-26 NOTE — PROGRESS NOTES
Inpatient Physical Therapy Evaluation and Treatment    Unit: [facility]  Date:  7/26/2020  Patient Name:    Mandy Latif  Admitting diagnosis:  Pneumonia [J18.9]  Admit Date:  7/24/2020  Precautions/Restrictions/WB Status/ Lines/ Wounds/ Oxygen: Fall risk, Bed/chair alarm, Lines -IV, [place] (hard of hearing) and Telemetry, COVID -  Patient cleared for session. RN states that patient was originally a PCU patient , transferred to [facility] for covid r/o. Patient will transfer later to PCU. Treatment Time:  32:  Treatment Number:  1   Timed Code Treatment Minutes: 27 minutes  Total Treatment Minutes:  37  minutes    Patient Goals for Therapy: \" to go home \"          Discharge Recommendations: Home 24 hr assist  and Home PT  DME needs for discharge: Needs Met       Therapy recommendation for EMS Transport: NA    Therapy recommendations for staff:   Assist of 1 with use of rolling walker (RW) and gait belt for all transfers and ambulation to/from [facility]  to/from chair  to/from bathroom  within room    History of Present Illness: ED note, Seven Tate, 7/24/2020:   \" 89 y. o. male presents to the ED with cough, fatigue, feeling tired all week, and cough seem to be getting better, but daughter here with him states that he is just not himself, and getting more short of breath, worse with exertion, cannot even make it across his house without getting significantly dyspneic, no known fevers, no chest pain, no urinary changes, he does wear depends, and is mostly incontinent, denies dysuria/hematuria, denies any bowel changes, mostly has been soft and mushy, brown, no melena or hematochezia, he is not on any blood thinners.  He does have a history of heart problems, has not seen a cardiologist in a while, used to see a doctor at [facility], but had retired and has not been in a couple years, daughter here with him helps with the history, patient is very hard of hearing when asked about the bruising on his chest,  patient's daughter states he is always itching, scratching causing this,, no other complaints, modifying factors or associated symptoms. \"     Home Health S4 Level Recommendation:  Level 1 Standard  AM-PAC Mobility Score    AM-PAC Inpatient Mobility Raw Score : 20       Information obtained from OT/PT evaluation on 1/20/2020 and edited as needed. Preadmission Environment    Pt. Richrd Guardian family (Son-Ozzie). Pt states having [address] son is not home  Home environment:    one story home  Steps to enter first floor:   1 steps to enter       Steps to second floor: no hand rail  Bathroom:       Walk-in Shower, Grab bars and Shower Chair   Equipment owned:      Rolling Walker, Rollator  and raised toilet seat w/o arms with grab bars near toilet     Preadmission Status / PLOF:  History of falls No recent falls. Pt states having a fall several months ago   Pt. Able to drive No, Ashu White) able to provide transportation   Pt Fully independent with ADL's No  Pt. Required assistance from family for:  Bathing, Dressing, Cooking, Cleaning and [address]  Pt. Fully independent for transfers and gait and walked with: Walker. Pt states using RW when in house and rollator when in the community     Pain   No    Cognition    A&O orientation not directly assessed. Able to follow 2 step commands with increased repetition due to hearing loss    Subjective  Patient lying supine in bed with no family present. Pt agreeable to this PT eval & tx. Upper Extremity ROM/Strength  Please see OT evaluation. Lower Extremity ROM / Strength   AROM WFL: Yes  ROM limitations:   WFL to complete transfers    Lower Extremity Sensation    NT    Lower Extremity Proprioception:   NT    Coordination and Tone  WFL    Balance  Sitting:  Good ;  Independent  Comments:     Standing: Good - ; CGA  Comments: RW    Bed Mobility   Supine to Sit:    Modified Independent  Sit to Supine:   Not Tested  Rolling:   Not Tested  Scooting in sitting: Independent  Scooting in supine:  Not Tested    Transfer Training     Sit to stand:   CGA  Stand to sit:   CGA  Bed to Chair:   CGA with use of gait belt and rolling walker (RW)    Gait gait completed as indicated below  Distance:      50 ft  Deviations (firm surface/linoleum):  decreased katy, narrow MARCK and forward flexed posture  Assistive Device Used:    gait belt and rolling walker (RW)  Level of Assist:    CGA  Comment: stedy,no LOB    Stair Training deferred, pt unsafe/ not appropriate to complete stairs at this time    Activity Tolerance   Pt completed therapy session with No adverse symptoms noted w/activity  EOB  /74     HR 88  Post transfer  /71   HR 90  Spo2 98%  Positioning Needs   Pt reclined in chair, alarm set, positioned in proper neutral alignment and pressure relief provided. Call light provided and all needs within reach    Other  None. Patient/Family Education   Pt educated on role of inpatient PT, POC, importance of continued activity, DC recommendations, safety awareness and calling for assist with mobility. Assessment  Pt seen for Physical Therapy evaluation in acute care setting. Pt demonstrated decreased Activity tolerance and Balance as well as decreased independence with Ambulation and Transfers. Recommending Home 24 hr assist and with home PT upon discharge as patient functioning close to baseline level    Goals : To be met in 3 visits:  1). Independent with LE Ex x 10 reps    To be met in 6 visits:  1). Supine to/from sit: Independent  2). Sit to/from stand: SBA  3). Bed to chair: SBA  4). Gait: Ambulate 100 ft.   with  SBA and use of rolling walker (RW)  5). Tolerate B LE exercises 3 sets of 10-15 reps  6).   Ascend/descend 1 steps with CGA with use of hand rail unilateral     Rehabilitation Potential: Good  Strengths for achieving goals include:   Pt motivated, PLOF, Family Support and Pt cooperative   Barriers to achieving goals include:    No Barriers    Plan    To be seen

## 2020-07-26 NOTE — PROGRESS NOTES
Pt A/O in bed and quiet. Denies any needs at this time. SR up x2, bed in lowest position, wheels locked,bed alarm on, Call light and bedside table in easy reach.    Danyell Hoskins RN

## 2020-07-26 NOTE — PROGRESS NOTES
Inpatient Occupational Therapy  Evaluation and Treatment    Unit: [facility]  Date:  7/26/2020  Patient Name:    Bong Wing  Admitting diagnosis:  Pneumonia [J18.9]  Admit Date:  7/24/2020   Precautions/Restrictions/WB Status/ Lines/ Wounds/ Oxygen: Fall risk, Bed/chair alarm, Lines -IV, [place] (hard of hearing) and Telemetry, COVID -  Patient cleared for session. RN states that patient was originally a PCU patient , transferred to [facility] for covid r/o. Patient will transfer later to PCU. COVID-19: Not detected, 7/26/2020     Treatment Time:  3495-0545  Treatment Number: 1     Billable Treatment Time: 27 minutes   Total Treatment Time:   37   minutes    Patient Goals for Therapy:  \" to go home \"      Discharge Recommendations: Home with 24/7 assist and home therapy  DME needs for discharge: Needs Met       Therapy recommendations for staff:   Assist of 1 with use of rolling walker (RW) and gait belt for all transfers and ambulation to/from [facility]  to/from chair  to/from bathroom  within room    History of Present Illness: ED note, Simeon Sierra, 7/24/2020:   \" 80 y.o. male presents to the ED with cough, fatigue, feeling tired all week, and cough seem to be getting better, but daughter here with him states that he is just not himself, and getting more short of breath, worse with exertion, cannot even make it across his house without getting significantly dyspneic, no known fevers, no chest pain, no urinary changes, he does wear depends, and is mostly incontinent, denies dysuria/hematuria, denies any bowel changes, mostly has been soft and mushy, brown, no melena or hematochezia, he is not on any blood thinners.   He does have a history of heart problems, has not seen a cardiologist in a while, used to see a doctor at [facility], but had retired and has not been in a couple years, daughter here with him helps with the history, patient is very hard of hearing when asked about the bruising on his chest,  patient's daughter states he is always itching, scratching causing this,, no other complaints, modifying factors or associated symptoms. \"     Home Health S4 Level Recommendation:  NA  AM-PAC Score: AM-PAC Inpatient Daily Activity Raw Score: 18    Information obtained from OT/PT evaluation on 1/20/2020 and edited as needed. Preadmission Environment    Pt. Lives with family Rosie Hermosillo. Pt states having Home Health Aid when son is not home  Home environment:    one story home  Steps to enter first floor:   1 steps to enter       Steps to second floor: no hand rail  Bathroom:       Walk-in Shower, Grab bars and Shower Chair   Equipment owned:      [employer], [address]  and raised toilet seat w/o arms with grab bars near toilet     Preadmission Status / PLOF:  History of falls No recent falls. Pt states having a fall several months ago   Pt. Able to drive No, Son Luciana Odell) able to provide transportation   Pt Fully independent with ADL's No  Pt. Required assistance from family for:  Bathing, Dressing, Cooking, Cleaning and Laundry   Pt. Fully independent for transfers and gait and walked with: Mando Saenz. Pt states using RW when in house and rollator when in the community     Pain  None reported throughout     Cognition    A&O orientation not directly assessed. Able to follow 2 step commands with increased repetition due to hearing loss    Subjective  Patient lying supine in bed with no family present. Pt agreeable to this OT eval & tx. Upper Extremity ROM:    WFL    Upper Extremity Strength:    BUE strength WFL, but not formally assessed w/ MMT    Upper Extremity Sensation    WNL    Upper Extremity Proprioception:  WNL    Coordination and Tone  WNL    Balance  Functional Sitting Balance:   WNL  Functional Standing Balance:Diminished    Bed Mobility   Supine to Sit:                      Modified Independent  Sit to Supine:                      Not Tested  Rolling:                     Not Tested  Scooting in sitting: Independent  Scooting in supine:  Not Tested     Transfer Training                Sit to stand:              CGA  Stand to sit:              CGA  Bed to Chair:                       CGA with use of gait belt and rolling walker (RW)    Dressing:     UE:   Not Tested  LE:    Min A    Bathing:    UE:  Not Tested  LE:  Not Tested    Eating:   SBA    Toileting:  Not Tested    Activity Tolerance   Pt completed therapy session with No adverse symptoms noted w/activity  EOB  /[birth year]     HR 88  Post transfer  /71   HR 90  Spo2 98%    Positioning Needs:   Up in chair, call light and needs in reach. Alarm Set    Exercise / Activities Initiated:   N/A    Patient/Family Education:   Role of OT  Recommendations for DC  Safe RW use/hand placement    Assessment of Deficits: Pt seen for Occupational therapy evaluation in acute care setting. Pt demonstrated decreased Activity tolerance, ADLs and Balance . Pt functioning below baseline and will likely benefit from skilled occupational therapy services to maximize safety and independence. Goal(s) : To be met in 3 Visits:  1). Bed to toilet/BSC: SBA    To be met in 5 Visits:  1). Supine to/from Sit:  Independent  2). Upper Body Bathing:   SBA  3). Lower Body Bathing:   CGA  4). Upper Body Dressing:  SBA  5). Lower Body Dressing:  CGA  6). Pt to demonstrate UE exs x 15 reps with minimal cues    Rehabilitation Potential:  Good for goals listed above. Strengths for achieving goals include: Pt motivated, Family Support and Pt cooperative  Barriers to achieving goals include:  [facility]      Plan: To be seen 3-5 x/wk while in acute care setting for therapeutic exercises, bed mobility, transfers, dressing, bathing, family/patient education, ADL/IADL retraining, energy conservation training.      RICARDO Welsh, OTR/L   [id number]           If patient discharges from this facility prior to next visit, this note will serve as the Discharge Summary

## 2020-07-26 NOTE — PROGRESS NOTES
4 Eyes Skin Assessment     The patient is being assess for   transfer to PCU    I agree that 2 RN's have performed a thorough Head to Toe Skin Assessment on the patient. ALL assessment sites listed below have been assessed. Areas assessed by both nurses:   [x]   Head, Face, and Ears   [x]   Shoulders, Back, and Chest, Abdomen  [x]   Arms, Elbows, and Hands   [x]   Coccyx, Sacrum, and Ischium  [x]   Legs, Feet, and Heels        Scattered bruising. Pink and blanchable redness to coccyx, preventative in place. **SHARE this note so that the co-signing nurse is able to place an eSignature**    Co-signer eSignature: Electronically signed by Juanita Montemayor RN on 7/26/20 at 5:48 PM EDT    Does the Patient have Skin Breakdown?   No          Jordin Prevention initiated:  Yes   Wound Care Orders initiated:  NA      [facility] nurse consulted for Pressure Injury (Stage 3,4, Unstageable, DTI, NWPT, Complex wounds)and New or Established Ostomies:  NA      Primary Nurse eSignature: Electronically signed by Mague Carmen RN on 7/26/20 at 5:39 PM EDT

## 2020-07-26 NOTE — PROGRESS NOTES
[address]   Progress Note  Cardiology      HPI: Evaluating Mr. Baron Dueñas today for f/u afib. Per nursing, no issues overnight and he does not have any specific complaints. I reviewed telemetry showing afib with occasional PVC's 95-105bpm.       Physical Examination:  Deferred due to R/O Covid-19 infxn, isolation, and conservation of PPE    Vitals:    07/26/20 0421   BP: 107/62   Pulse: 80   Resp: 17   Temp: 98 °F (36.7 °C)   SpO2: 97%          Lab Results   Component Value Date    WBC 8.6 07/25/2020    HGB 7.4 (L) 07/25/2020    HCT 26.3 (L) 07/25/2020    MCV 71.0 (L) 07/25/2020     07/25/2020     Lab Results   Component Value Date    CREATININE 1.3 07/25/2020    BUN 23 (H) 07/25/2020     07/25/2020    K 4.4 07/25/2020     07/25/2020    CO2 23 07/25/2020     Lab Results   Component Value Date    INR 1.07 01/18/2020    PROTIME 12.4 01/18/2020        Assessment:  Meghann Blue is a 80 y.o. patient who presented to [facility] 7/24/20 with c/o weakness, fatigue, and SOB. Prior saw cards Dr. Lito Villagomez Dec. 2018. He has PMH of reported CAD (no specific details available), DM, HTN, HLD, prostate cancer, and CRI. Most recent ECHO from 4/26/18 shows EF=50-55%; mild LVH; mild AI/MR; mild pulm HTN. Now presents with reported fatigue, cough, and SAN. Admit EKG showed atrial fibrillation 92bpm; nonspecific ST change; PVC (note January 2020 EKG sinus tach). Note CXR with CM; small bilateral pleural effusions L>R; basilar ASD. Note Jseus 0.04 x 3, 0.03; RCR=0430; BUN/Cr=23/1.3; Mg+ 2.2; H/H=7.4/26.3 (1/20=10/32). Diagnosis of new-onset atrial fibrillation rate-controlled and mild Jesus elevation in elderly male with community-acquired basilar PNA. Uncertain duration but sinus tach in Jan 2020.      Recs:  1. Currently HR controlled and would not use BB or CCB. Follow telemetry. Goal avg  or less.  If in future consistently > 110bpm and/or symptomatic I would add BB or CCB at that time. 2. CHADs-vasc=4 and ideally would TRISR [facility] with NOAC. However, he is anemic and would pursue w/u of anemia and only anticoagulate if felt safe to do so. Defer to hospitalist.   3. Pulmonary consult to treat PNA. 4. Jesus may be elevated to PNA and mild CRI. Would not pursue ischemia w/u with nuc stress at this time. 5. Note normal TSH 3.48 in Sept 2019.  6. Would repeat limited ECHO only to see if any change in EF which would guide medical mgt (ie. Need for ACE-I and BB if LVEF is depressed. Signing off. If future limited ECHO shows change in EF then please call back. Otherwise, follow recs above. Thank you.        Patient Active Problem List   Diagnosis    Hypertension    Benign prostatic hyperplasia    CAD (coronary artery disease)    Type 2 diabetes mellitus without complication ([address]    Osteoarthrosis    Restless legs syndrome    Renal failure    Neoplasm of uncertain behavior of skin    Hyperlipidemia associated with type 2 diabetes mellitus (HCC)    Chronic kidney disease (CKD), stage III (moderate) (HCC)    Primary osteoarthritis of both knees    Acute encephalopathy    Hypoglycemia    Elevated LFTs    BPH with urinary obstruction    Adenocarcinoma of prostate ([address]    Acute cystitis without hematuria    Hypophosphatemia    Weakness    Severe protein-calorie malnutrition (HCC)    Constipation    Community acquired pneumonia    New onset a-fib (HCC)    Elevated troponin

## 2020-07-26 NOTE — PLAN OF CARE
Problem: Falls - Risk of:  Goal: Will remain free from falls  Description: Will remain free from falls  7/26/2020 0246 by Tulio Brody RN  Outcome: Met This Shift  7/25/2020 1901 by Stevo Mock RN  Outcome: Ongoing     Problem: Skin Integrity:  Goal: Will show no infection signs and symptoms  Description: Will show no infection signs and symptoms  Outcome: Met This Shift  Goal: Absence of new skin breakdown  Description: Absence of new skin breakdown  Outcome: Met This Shift

## 2020-07-26 NOTE — PROGRESS NOTES
Progress Note    Admit Date:  7/24/2020     Presented with c/o fatigue, cough, SAN. Admitted for pneumonia, A-fib RVR, anemia, and COVID rule out. COVID not detected. DC droplet plus precautions      Subjective:  Mr. Cherry Hunt is awake and alert. Decrease shortness of breath , still wheezing . Objective:   /77   Pulse 91   Temp 97.2 °F (36.2 °C) (Oral)   Resp 18   Ht 5' 5\" (1.651 m)   Wt 156 lb 12 oz (71.1 kg)   SpO2 98%   BMI 26.08 kg/m²       Intake/Output Summary (Last 24 hours) at 7/26/2020 1302  Last data filed at 7/25/2020 2306  Gross per 24 hour   Intake 540.14 ml   Output --   Net 540.14 ml         Physical Exam:  General:  Awake, alert, NAD  Skin:  Warm and dry  Neck:  JVD absent. Neck supple  Chest: Diminished breath sounds, bilateral expiratory wheezes present. No rales or rhonchi. Cardiovascular:  RRR ,S1S2 normal  Abdomen:  Soft, non tender, non distended, BS +  Extremities:  No edema. Intact peripheral pulses. Brisk cap refill, < 2 secs  Neuro: non focal      Medications:   Scheduled Meds:   aspirin  81 mg Oral Daily    ferrous sulfate  325 mg Oral Every Other Day    isosorbide dinitrate  10 mg Oral TID    rOPINIRole  1 mg Oral Nightly    sodium chloride flush  10 mL Intravenous 2 times per day    enoxaparin  1 mg/kg Subcutaneous BID    insulin lispro  0-6 Units Subcutaneous TID WC    insulin lispro  0-3 Units Subcutaneous Nightly    cefTRIAXone (ROCEPHIN) IV  1 g Intravenous Q24H    azithromycin  500 mg Intravenous Q24H       Continuous Infusions:   dextrose         Data:  CBC:   Recent Labs     07/24/20 2050 07/25/20  0536   WBC 7.6 8.6   RBC 3.82* 3.71*   HGB 7.8* 7.4*   HCT 27.0* 26.3*   MCV 70.9* 71.0*   RDW 18.8* 19.0*    301     BMP:   Recent Labs     07/24/20 2050 07/25/20  0536    142   K 4.3 4.4    106   CO2 25 23   BUN 25* 23*   CREATININE 1.2 1.3     BNP: No results for input(s): BNP in the last 72 hours.   PT/INR: No results for input(s): PROTIME, INR in the last 72 hours. APTT: No results for input(s): APTT in the last 72 hours. CARDIAC ENZYMES:   Recent Labs     07/25/20  0130 07/25/20  0536 07/25/20  1059   TROPONINI 0.04* 0.04* 0.03*     FASTING LIPID PANEL:  Lab Results   Component Value Date    CHOL 143 09/18/2019    HDL 38 (L) 09/18/2019    TRIG 106 09/18/2019     LIVER PROFILE:   Recent Labs     07/24/20  2050 07/25/20  0536   AST 22 23   ALT 9* 9*   BILITOT 0.6 0.5   ALKPHOS 118 116        Radiology  XR CHEST PORTABLE   Final Result   Cardiomegaly with small bilateral pleural effusions, left side greater than   right. Bibasilar airspace disease which could represent atelectasis or pneumonia. Assessment:  Active Problems:    Community acquired pneumonia    New onset a-fib ([address]    Elevated troponin  Resolved Problems:    * No resolved hospital problems. *      Plan:    #  Pneumonia,    community-acquired pneumonia suspect strep pneumonia or other gram positive organism  - bibasilar ASD  - cont IV ceftriaxone and azithro D#2.    - Chk resp cx.  F/u bld cx.   - COVID ruled out   - Pulmonology consulted. -Resume nebulizer treatments     #  A fib w/ mild RVR (rates up to the 100's), new onset.    - Cardiology consulted, monitor on tele, serial trops (0.04, 0.03). - Full dose Lovenox. HR controlled. Workup anemia, then consider AC. Check Echo     #  Microcytic, hypochromic anemia  - acute on chronic, HGB 7.8, last was 10.1 in Jan 2020. Treva Labrum was negative at [facility] is supposed to be on Fe supp but has not been taking recently.    - monitor H/H  - GI consult for endoscopic eval     #  Indeterminate troponin, 0.04, tele and serial trops 0.04, 0.03.  Denies CP.   Check Echo     #  Clinical dehydration, IVF.  Monitor for vol OL.       #  Prolonged QTc, 484 ms, avoid QT prolonging agents as able.       #  HTN, controlled, cont home regimen.     #  CKD III, Cr stable.       #  Hx CAD, cont ASA.     DVT Prophylaxis: Full dose Lovenox    Diet: Diet dysphagia soft and bite-sized; carb control   Code Status: Full Code       Swati Masters MD 7/26/2020 1:02 PM

## 2020-07-26 NOTE — PROGRESS NOTES
Shift assessment complete - See flow sheet. Nightly medications given - See [facility]    Patient is currently on RA    SpO2 98%    Lung sounds diminished          Patient with no complaints of pain at this time. Patient denies any further needs. Bed alarm is set for patient safety.  Call light explained and in reach

## 2020-07-27 NOTE — FLOWSHEET NOTE
07/27/20 1429   Vital Signs   Temp 97.3 °F (36.3 °C)   Temp Source Oral   Pulse 74   Heart Rate Source Monitor   Resp 16   /73   BP Location Left Arm   BP Upper/Lower Upper   Oxygen Therapy   SpO2 98 %   O2 Device None (Room air)

## 2020-07-27 NOTE — PLAN OF CARE
Problem: Falls - Risk of:  Goal: Will remain free from falls  Description: Will remain free from falls  7/27/2020 0109 by Ayo Rosen RN  Outcome: Ongoing  7/26/2020 1507 by Javier Cortés RN  Outcome: Ongoing  Goal: Absence of physical injury  Description: Absence of physical injury  Outcome: Ongoing     Problem: Skin Integrity:  Goal: Will show no infection signs and symptoms  Description: Will show no infection signs and symptoms  7/27/2020 0109 by Ayo Rosen RN  Outcome: Ongoing  7/26/2020 1507 by Javier Cortés RN  Outcome: Ongoing  Goal: Absence of new skin breakdown  Description: Absence of new skin breakdown  Outcome: Ongoing     Problem: Discharge Planning:  Goal: Discharged to appropriate level of care  Description: Discharged to appropriate level of care  Outcome: Ongoing  Goal: Participates in care planning  Description: Participates in care planning  Outcome: Ongoing     Problem: Airway Clearance - Ineffective:  Goal: Clear lung sounds  Description: Clear lung sounds  Outcome: Ongoing  Goal: Ability to maintain a clear airway will improve  Description: Ability to maintain a clear airway will improve  Outcome: Ongoing     Problem: Fluid Volume - Deficit:  Goal: Achieves intake and output within specified parameters  Description: Achieves intake and output within specified parameters  7/27/2020 0109 by Ayo Rosen RN  Outcome: Ongoing  7/26/2020 1507 by Javier Cortés RN  Outcome: Ongoing     Problem: Gas Exchange - Impaired:  Goal: Levels of oxygenation will improve  Description: Levels of oxygenation will improve  Outcome: Ongoing     Problem: Hyperthermia:  Goal: Ability to maintain a body temperature in the normal range will improve  Description: Ability to maintain a body temperature in the normal range will improve  Outcome: Ongoing     Problem: Tobacco Use:  Goal: Will participate in inpatient tobacco-use cessation counseling  Description: Will participate in inpatient tobacco-use cessation counseling  Outcome: Ongoing

## 2020-07-27 NOTE — PROGRESS NOTES
Recommended Diet and Intervention  Solids: Dysphagia Soft and Bite-Sized (Dysphagia III)  Liquid  Thin  Meds: Whole with water (1-2 at a time)      Speech Language Pathology  Facility/Department: [facility] PCU TELEMETRY  Dysphagia Daily Treatment Note    NAME: Diego Moran  : 1924  MRN: [id number]    Patient Diagnosis(es):   Patient Active Problem List    Diagnosis Date Noted    Iron deficiency anemia     History of atherosclerotic heart disease     Community acquired pneumonia 2020    [facility]fib [facility]     Elevated troponin     Constipation     Severe protein-calorie malnutrition ([address] 2020    Hypophosphatemia     Weakness     Acute cystitis without hematuria     Adenocarcinoma of prostate ([facility] [address] 2019    BPH with urinary obstruction 2019    Hypoglycemia     Elevated LFTs     Acute encephalopathy 2019    Primary osteoarthritis of both knees 2017    Chronic kidney disease (CKD), stage III (moderate) ([facility] [address] 2016    Hyperlipidemia associated with type 2 diabetes mellitus ([facility] [address] 2015    Neoplasm of uncertain behavior of skin 2011    Hypertension     Benign prostatic hyperplasia     CAD (coronary artery disease)     Type 2 diabetes mellitus without complication (HCC)     Osteoarthrosis     Restless legs syndrome     Renal failure      Allergies: No Known Allergies  Onset Date: 2020  Subjective: Pt seen upright in bed. Pt cooperative and alert. Pain: no c/o pain    Current Diet: DIET DYSPHAGIA SOFT AND BITE-SIZED; Carb Control: 5 carb choices (75 gms)/meal    Diet Tolerance:  Patient grossly tolerating current diet level without signs/symptoms of penetration / aspiration. P.O. Trials: Thin   x x4 cup sips   Nectar / Mildly Thick       Honey / Moderately Thick       Pudding / Extremely Thick       Puree       Solid   x Pleasantly denied SBS solids     Dysphagia Treatment and Impressions: Pt seen asleep upon entry.  Pt woken to sternal rubs and calling out of name. Pt seen upright in bed with 1L of O2 via NC. ST repositioned O2 back onto nose (found under chin). Pt RR remained at 24/min before and after PO trials. Pt vocal quality remained clear following PO of thin liquids. Pt intermittently presents with weak, congested coughing at baseline prior to PO. Pt accepted PO trials of thin liquids via cup sip and politely denied SBS solid PO this date. Pt reported increased lethargy this date. Pt mildly confused. Pt oriented with ST assistance. Pt reports no s/s of aspiration this date. Dysphagia Goals:  Timeframe for Long-term Goals: 7 days (8/1/20)   Pt will tolerate least restrictive diet without clinical s/s of aspiration. 07/27 Goal Addressed; Ongoing    Short-term Goals  Timeframe for Short-term Goals: 7 days (8/1/20)  Dysphagia Goals:  Goal 1: The patient will tolerate recommended diet without observed clinical signs of aspiration 07/27 Goal Addressed; Progressing  Goal 2: The patient will recall and perform compensatory strategies, with no cues 07/27 Goal Addressed; Ongoing. Pt required mod cues to recall compensatory strategies. Goal 3: The patient will tolerate regular consistency solids 10/10. 07/27 Goal Not Addressed; Pt politely declined. Recommendations:  Recommended Diet and Intervention  Solids: Dysphagia Soft and Bite-Sized (Dysphagia III)  Liquid  Thin  Meds: Whole with water (1-2 at a time)    Patient/Family/Caregiver Education: Pt educated on safe swallowing strategies and role of ST. Pt required cues to recall strategies. Compensatory Strategies: Alternate solids and liquids;Eat/Feed slowly;Upright as possible for all oral intake;Remain upright for 30-45 minutes after meals;Small bites/sips    Plan:    Continued Dysphagia treatment with goals per plan of care.     Discharge Recommendations: TBD based on progress    If pt discharges from hospital prior to Speech/Swallowing discharge, this note serves as tx and discharge summary.      Total Treatment Time / Charges     Time in Time out Total Time / units   Cognitive Tx         Speech Tx      Dysphagia Tx 1355 1405 10 min/ 1 unit     Signature:    L[facility] [id number]  Speech Language Pathologist

## 2020-07-27 NOTE — PROGRESS NOTES
Progress Note    Admit Date:  7/24/2020     Presented with c/o fatigue, cough, SAN. Admitted for pneumonia, A-fib RVR, anemia, and COVID rule out. COVID not detected. DC droplet plus precautions      Subjective:  Mr. Kathleen Do is feeling some better. ECHO to be done today. Still in a fib. CVR. Has seen GI and Oncology before for anemia and weight loss. Procedures deferred due to advanced age. He has not been compliant with iron at home. Objective:   BP (!) 144/75   Pulse 93   Temp 99.5 °F (37.5 °C) (Oral)   Resp 24   Ht 5' 5\" (1.651 m)   Wt 154 lb 4.8 oz (70 kg)   SpO2 (!) 88%   BMI 25.68 kg/m²       Intake/Output Summary (Last 24 hours) at 7/27/2020 1054  Last data filed at 7/27/2020 0340  Gross per 24 hour   Intake 375 ml   Output --   Net 375 ml         Physical Exam:  General:  Awake, alert, NAD  Skin:  Warm and dry  Neck:  JVD absent. Neck supple  Chest: Diminished breath sounds,  Few bilateral expiratory wheezes present. No rales or rhonchi. Cardiovascular:  RRR ,S1S2 normal  Abdomen:  Soft, non tender, non distended, BS +  Extremities:  No edema. Intact peripheral pulses.  Brisk cap refill, < 2 secs  Neuro: non focal      Medications:   Scheduled Meds:   ipratropium-albuterol  1 ampule Inhalation 4x daily    aspirin  81 mg Oral Daily    ferrous sulfate  325 mg Oral Every Other Day    isosorbide dinitrate  10 mg Oral TID    rOPINIRole  1 mg Oral Nightly    sodium chloride flush  10 mL Intravenous 2 times per day    enoxaparin  1 mg/kg Subcutaneous BID    insulin lispro  0-6 Units Subcutaneous TID WC    insulin lispro  0-3 Units Subcutaneous Nightly    cefTRIAXone (ROCEPHIN) IV  1 g Intravenous Q24H    azithromycin  500 mg Intravenous Q24H       Continuous Infusions:   dextrose         Data:  CBC:   Recent Labs     07/24/20 2050 07/25/20  0536 07/27/20  0445   WBC 7.6 8.6 6.7   RBC 3.82* 3.71* 3.75*   HGB 7.8* 7.4* 7.4*   HCT 27.0* 26.3* 26.5*   MCV 70.9* 71.0* 70.6*   RDW 18.8* 19.0* recently.    - monitor H/H  - given advanced age not sure he is a candidate for workup. Has seen hematology before.      #  Indeterminate troponin, 0.04, tele and serial trops 0.04, 0.03.  Denies CP.   Check Echo     #  Clinical dehydration, IVF.  Monitor for vol OL.       #  Prolonged QTc, 484 ms, avoid QT prolonging agents as able.       #  HTN, controlled, cont home regimen.     #  CKD III, Cr stable.       #  Hx CAD, cont ASA.     DVT Prophylaxis: Full dose Lovenox    Diet: Diet dysphagia soft and bite-sized; carb control   Code Status: Full Code       [address], MD 7/27/2020 10:54 AM

## 2020-07-27 NOTE — CONSULTS
complication, not stated as uncontrolled      Past Surgical History:   Procedure Laterality Date    TURP  08/14/2019    TURP N/A 8/14/2019    CYSTOSCOPY TRANSURETHRAL RESECTION PROSTATE, CYSTOLITHOPAXY performed by Soraida Purvis MD at [facility]       Social:   Social History     Tobacco Use    Smoking status: Never Smoker    Smokeless tobacco: Never Used   Substance Use Topics    Alcohol use: No     Family:   Family History   Problem Relation Age of Onset    Cancer Mother         Bone CA    Stroke Father      No current facility-administered medications on file prior to encounter. Current Outpatient Medications on File Prior to Encounter   Medication Sig Dispense Refill    isosorbide dinitrate (ISORDIL) 10 MG tablet TAKE 1 TABLET BY MOUTH 3 TIMES A DAY 90 tablet 0    triamterene-hydroCHLOROthiazide (DYAZIDE) 37.5-25 MG per capsule TAKE 1 CAPSULE ONCE DAILY 90 capsule 0    glimepiride (AMARYL) 2 MG tablet TAKE 1 TABLET TWICE DAILY 180 tablet 0    rOPINIRole (REQUIP) 1 MG tablet TAKE ONE TABLET AT BEDTIME 30 tablet 2    lactobacillus (CULTURELLE) capsule Take 1 capsule by mouth 2 times daily 30 capsule 0    magnesium hydroxide (MILK OF MAGNESIA) 400 MG/5ML suspension Take 30 mLs by mouth daily as needed for Constipation 355 mL 0    ferrous sulfate 325 (65 Fe) MG tablet Take 325 mg by mouth every other day      Misc. Devices (MARIA ISABEL ROLLING WALKER ELITE) MISC Use for ambulation 1 each 0    aspirin 81 MG tablet Take 1 tablet by mouth daily.  30 tablet       Infusions:    dextrose       PRN Medications: sodium chloride flush, acetaminophen **OR** acetaminophen, polyethylene glycol, glucose, dextrose, glucagon (rDNA), dextrose, prochlorperazine, albuterol sulfate HFA **AND** ipratropium **AND** MDI Treatment, perflutren lipid microspheres  Allergies: No Known Allergies    ROS:   Constitutional: negative for chills, fevers and sweats  Eyes: negative for cataracts, icterus and redness  Ears, nose, mouth, throat, and face: negative for epistaxis, and sore throat. Positive for hearing loss   Respiratory: negative for cough, hemoptysis and sputum  Cardiovascular: negative for chest pain, and lower extremity edema. Positive for dyspnea. Gastrointestinal: as per HPI  Genitourinary:negative for dysuria, frequency and hematuria  Neurological: negative for coordination problems, dizziness and gait problems  Behavioral/Psych: negative for anxiety, depression and mood swings    Physical Exam   BP (!) 144/75   Pulse 93   Temp 99.5 °F (37.5 °C) (Oral)   Resp 24   Ht 5' 5\" (1.651 m)   Wt 154 lb 4.8 oz (70 kg)   SpO2 (!) 88%   BMI 25.68 kg/m²     General appearance: alert, appears stated age, cooperative, mild distress and pale  Head: Normocephalic, without obvious abnormality, atraumatic  Eyes: conjunctivae/corneas clear. PERRL, EOM's intact. Fundi benign. Neck: no adenopathy and supple, symmetrical, trachea midline  Lungs: clear to auscultation bilaterally  Heart: regular rate and rhythm, S1, S2 normal, no murmur, click, rub or gallop  Abdomen: soft, non-tender; bowel sounds normal; no masses,  no organomegaly  Extremities: extremities normal, atraumatic, no cyanosis or edema    Lab and Imaging Review   Labs:  CBC:   Recent Labs     07/24/20 2050 07/25/20  0536 07/27/20  0445   WBC 7.6 8.6 6.7   HGB 7.8* 7.4* 7.4*   HCT 27.0* 26.3* 26.5*   MCV 70.9* 71.0* 70.6*    301 281     BMP:   Recent Labs     07/24/20 2050 07/25/20  0536 07/27/20  0445    142 137   K 4.3 4.4 4.2    106 105   CO2 25 23 21   PHOS  --   --  3.4   BUN 25* 23* 20   CREATININE 1.2 1.3 1.1     LIVER PROFILE:   Recent Labs     07/24/20 2050 07/25/20  0536   AST 22 23   ALT 9* 9*   PROT 6.6 6.4   BILITOT 0.6 0.5   ALKPHOS 118 116     PT/INR: No results for input(s): INR in the last 72 hours.     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 Hospital course complicated by new onset atrial fibrillation requiring anticoagulation, and anemia. Continue supportive care. Continue PPI daily. Monitor Hgb and observe for signs of bleeding. Check CT abd/pelvis. Will consider EGD+Colon based on CT results.     Get White MD          [phone]  [phone]

## 2020-07-27 NOTE — PROGRESS NOTES
Pulmonary Progress Note    CC: Shortness of breath    Subjective:   Appears comfortable  Hard hearing        Intake/Output Summary (Last 24 hours) at 7/27/2020 1151  Last data filed at 7/27/2020 0340  Gross per 24 hour   Intake 375 ml   Output --   Net 375 ml       Exam:   BP (!) 144/75   Pulse 93   Temp 99.5 °F (37.5 °C) (Oral)   Resp 24   Ht 5' 5\" (1.651 m)   Wt 154 lb 4.8 oz (70 kg)   SpO2 (!) 88%   BMI 25.68 kg/m²  on 1 L  Gen: No distress. Alert. Eyes: PERRL. No sclera icterus. No conjunctival injection. ENT: No discharge. Pharynx clear. Neck: Trachea midline. Normal thyroid. Resp: No accessory muscle use. Basilar crackles. No wheezes. Few rhonchi. No dullness on percussion. CV: irregular rate. Regular rhythm. No murmur or rub. No edema. GI: Non-tender. Non-distended. No masses. No organomegaly. Skin: Warm and dry. No nodule on exposed extremities. No rash on exposed extremities. M/S: No cyanosis. No joint deformity. No clubbing.          Scheduled Meds:   ipratropium-albuterol  1 ampule Inhalation 4x daily    aspirin  81 mg Oral Daily    ferrous sulfate  325 mg Oral Every Other Day    isosorbide dinitrate  10 mg Oral TID    rOPINIRole  1 mg Oral Nightly    sodium chloride flush  10 mL Intravenous 2 times per day    enoxaparin  1 mg/kg Subcutaneous BID    insulin lispro  0-6 Units Subcutaneous TID WC    insulin lispro  0-3 Units Subcutaneous Nightly    cefTRIAXone (ROCEPHIN) IV  1 g Intravenous Q24H    azithromycin  500 mg Intravenous Q24H     Continuous Infusions:   dextrose       PRN Meds:  sodium chloride flush, acetaminophen **OR** acetaminophen, polyethylene glycol, glucose, dextrose, glucagon (rDNA), dextrose, prochlorperazine, albuterol sulfate HFA **AND** ipratropium **AND** MDI Treatment, perflutren lipid microspheres    Labs:  CBC:   Recent Labs     07/24/20 2050 07/25/20  0536 07/27/20  0445   WBC 7.6 8.6 6.7   HGB 7.8* 7.4* 7.4*   HCT 27.0* 26.3* 26.5*   MCV 70.9* 71.0* 70.6*    301 281     BMP:   Recent Labs     07/24/20 2050 07/25/20  0536 07/27/20  0445    142 137   K 4.3 4.4 4.2    106 105   CO2 25 23 21   PHOS  --   --  3.4   BUN 25* 23* 20   CREATININE 1.2 1.3 1.1     LIVER PROFILE:   Recent Labs     07/24/20 2050 07/25/20  0536   AST 22 23   ALT 9* 9*   BILITOT 0.6 0.5   ALKPHOS 118 116     PT/INR: No results for input(s): PROTIME, INR in the last 72 hours. APTT: No results for input(s): APTT in the last 72 hours. UA:  Recent Labs     07/25/20  0120   COLORU Yellow   PHUR 5.5   CLARITYU Clear   SPECGRAV >=1.030   LEUKOCYTESUR Negative   UROBILINOGEN 1.0   BILIRUBINUR Negative   BLOODU Negative   GLUCOSEU Negative     No results for input(s): PHART, LCJ8CPC, PO2ART in the last 72 hours. Cultures:  7/24 BC NGTD  7/24 COVID-19 negative      Films:  CXR 7/24 reviewed by me and showed bilateral lower lobe airspace disease, worse on the left      ASSESSMENT:  · Community-acquired pneumonia  · New onset A.  Fib  · Acute on chronic anemia  · Chronic kidney disease        PLAN:  · Supplemental oxygen to maintain SaO2 >92%; wean as tolerated  · Ceftriaxone and Zithromax day #4/7  · Cardiology following  · Echo pending

## 2020-07-27 NOTE — CARE COORDINATION
INTERDISCIPLINARY PLAN OF CARE CONFERENCE    Date/Time: 7/27/2020 2:50 PM  Completed by: Leander Beaver, Case Management      Patient Name:  Shaheed Serna  YOB: 1924  Admitting Diagnosis: Pneumonia [J18.9]     Admit Date/Time:  7/24/2020  8:05 PM    Chart reviewed. Interdisciplinary team met to discuss patient progress and discharge plans. Disciplines included Case Management, Nursing, and Dietitian. Current Status:inpt    PT/OT recommendation:home with 24/7 assist and home PT    Anticipated Discharge Date: tbd  Expected D/C Disposition:  Home  Confirmed plan with patient and/or family Yes  Discharge Plan Comments: Reviewed chart. Plan home with son with possible hhc. The Plan for Transition of Care is related to the following treatment goals: home    The Patient and/or patient representative  was provided with a choice of provider and agrees   with the discharge plan.  [x] Yes [] No      Home O2 in place on admit: No  Pt informed of need to bring portable home O2 tank on day of discharge for nursing to connect prior to leaving:  Not Indicated  Verbalized agreement/Understanding:  Not Indicated

## 2020-07-27 NOTE — PROGRESS NOTES
Speech Language Pathology  Dysphagia TX Attempt Note    SLP reviewed Pt's chart and hx. Spoke with RN and approved dysphagia tx this date. Upon entry, Pt was not in room. RN reported Pt down having an ECHO. Will re-attempt dysphagia tx in afternoon. Thank you!     L[facility] [id number]  Speech Language Pathologist

## 2020-07-27 NOTE — FLOWSHEET NOTE
07/27/20 0759   Vital Signs   Temp 99.5 °F (37.5 °C)   Temp Source Oral   Pulse 93   Heart Rate Source Monitor   Resp 24   BP (!) 144/75   BP Location Right Arm   BP Upper/Lower Upper   MAP (mmHg) 98   Level of Consciousness 0   MEWS Score 2   Patient Currently in Pain Denies   Oxygen Therapy   SpO2 (!) 88 %   O2 Device None (Room air)  (placed on 1 L)   Assessment completed and documented VSS, Afib on the monitor rate controlled, and placed on 1 L after SpO2 88%.

## 2020-07-28 NOTE — FLOWSHEET NOTE
07/28/20 0937   Vital Signs   Temp 96.9 °F (36.1 °C)   Temp Source Oral   Pulse 93   Heart Rate Source Monitor   /75   BP Location Left upper arm   Patient Position High fowlers   Level of Consciousness 0   Patient Currently in Pain Denies   Oxygen Therapy   SpO2 97 %   O2 Device None (Room air)   Assessment completed and documented VSS, afib on the monitor and remains on RA.

## 2020-07-28 NOTE — PROGRESS NOTES
RESPIRATORY THERAPY ASSESSMENT    Name:  Juliette Redd  Medical Record Number:  [id number]  Age: 80 y.o. Gender: male  : 1924  Today's Date:  2020  Room:  /0325-02    Assessment     Is the patient being admitted for a COPD or Asthma exacerbation? No   (If yes the patient will be seen every 4 hours for the first 24 hours and then reassessed)    Patient Admission Diagnosis      Allergies  No Known Allergies    Minimum Predicted Vital Capacity:               Actual Vital Capacity:                    Pulmonary History: no history  Home Oxygen Therapy:  none  Home Respiratory Therapy: Albuterol prn    Current Respiratory Therapy:   Duoneb QID, Albuterol/ Atrovent prn   Treatment Type: HHN  Medications: Albuterol/Ipratropium    Respiratory Severity Index(RSI)   Patients with orders for inhalation medications, oxygen, or any therapeutic treatment modality will be placed on Respiratory Protocol. They will be assessed with the first treatment and at least every 72 hours thereafter. The following severity scale will be used to determine frequency of treatment intervention.     Smoking History: No Smoking History = 0    Social History  Social History     Tobacco Use    Smoking status: Never Smoker    Smokeless tobacco: Never Used   Substance Use Topics    Alcohol use: No    Drug use: No       Recent Surgical History: None = 0  Past Surgical History  Past Surgical History:   Procedure Laterality Date    TURP  2019    TURP N/A 2019    CYSTOSCOPY TRANSURETHRAL RESECTION PROSTATE, CYSTOLITHOPAXY performed by Lou Carlson MD at [facility] OR       Level of Consciousness: Alert, Oriented, and Cooperative = 0    Level of Activity: Mostly sedentary, minimal walking = 2    Respiratory Pattern: Increased; RR 21-30 = 1    Breath Sounds: Diminshed bilaterally and/or crackles = 2    Sputum   ,  ,    Cough: Strong, spontaneous, non-productive = 0    Vital Signs   /76   Pulse 92   Temp 5. Bronchodilators will be delivered via Metered Dose Inhaler (MDI), HHN, Aerogen to intubated patients on mechanical ventilation. 6. Inhalation medication orders will be delivered and/or substituted as outlined below. Aerosolized Medications Ordering and Administration Guidelines:    1. All Medications will be ordered by a physician, and their frequency and/or modality will be adjusted as defined by the patients Respiratory Severity Index (RSI) score. 2. If the patient does not have documented COPD, consider discontinuing anticholinergics when RSI is less than 9.  3. If the bronchospasm worsens (increased RSI), then the bronchodilator frequency can be increased to a maximum of every 4 hours. If greater than every 4 hours is required, the physician will be contacted. 4. If the bronchospasm improves, the frequency of the bronchodilator can be decreased, based on the patient's RSI, but not less than home treatment regimen frequency. 5. Bronchodilator(s) will be discontinued if patient has a RSI less than 9 and has received no scheduled or as needed treatment for 72  Hrs. Patients Ordered on a Mucolytic Agent:    1. Must always be administered with a bronchodilator. 2. Discontinue if patient experiences worsened bronchospasm, or secretions have lessened to the point that the patient is able to clear them with a cough. Anti-inflammatory and Combination Medications:    1. If the patient lacks prior history of lung disease, is not using inhaled anti-inflammatory medication at home, and lacks wheezing by examination or by history for at least 24 hours, contact physician for possible discontinuation.

## 2020-07-28 NOTE — PROGRESS NOTES
Bowel prep started patient has several questions attempted to discuss, patient is forgetful and [place].

## 2020-07-28 NOTE — PROGRESS NOTES
Pulmonary Progress Note    CC: Shortness of breath    Subjective:   Hard hearing  Confused        Intake/Output Summary (Last 24 hours) at 7/28/2020 0759  Last data filed at 7/28/2020 0506  Gross per 24 hour   Intake 333 ml   Output --   Net 333 ml       Exam:   /87   Pulse 101   Temp 98.1 °F (36.7 °C) (Oral)   Resp 17   Ht 5' 5\" (1.651 m)   Wt 163 lb 4.8 oz (74.1 kg)   SpO2 92%   BMI 27.17 kg/m²  on room air  Gen: No distress. Alert. Eyes: PERRL. No sclera icterus. No conjunctival injection. ENT: No discharge. Pharynx clear. Neck: Trachea midline. Normal thyroid. Resp: No accessory muscle use. Basilar crackles. No wheezes. Few rhonchi. CV: irregular rate. Regular rhythm. No murmur or rub. No edema. GI: Non-tender. Non-distended. No masses. No organomegaly. Skin: Warm and dry. No nodule on exposed extremities. No rash on exposed extremities. M/S: No cyanosis. No joint deformity. No clubbing.          Scheduled Meds:   ipratropium-albuterol  1 ampule Inhalation 4x daily    aspirin  81 mg Oral Daily    ferrous sulfate  325 mg Oral Every Other Day    isosorbide dinitrate  10 mg Oral TID    rOPINIRole  1 mg Oral Nightly    sodium chloride flush  10 mL Intravenous 2 times per day    enoxaparin  1 mg/kg Subcutaneous BID    insulin lispro  0-6 Units Subcutaneous TID WC    insulin lispro  0-3 Units Subcutaneous Nightly    cefTRIAXone (ROCEPHIN) IV  1 g Intravenous Q24H    azithromycin  500 mg Intravenous Q24H     Continuous Infusions:   dextrose       PRN Meds:  sodium chloride flush, acetaminophen **OR** acetaminophen, polyethylene glycol, glucose, dextrose, glucagon (rDNA), dextrose, prochlorperazine, albuterol sulfate HFA **AND** ipratropium **AND** MDI Treatment, perflutren lipid microspheres    Labs:  CBC:   Recent Labs     07/27/20 0445 07/28/20  0505   WBC 6.7 8.6   HGB 7.4* 7.4*   HCT 26.5* 26.3*   MCV 70.6* 70.5*    244     BMP:   Recent Labs     07/27/20 0445 07/28/20  0505    135*   K 4.2 3.8    102   CO2 21 22   PHOS 3.4  --    BUN 20 19   CREATININE 1.1 1.1     LIVER PROFILE:   No results for input(s): AST, ALT, LIPASE, BILIDIR, BILITOT, ALKPHOS in the last 72 hours. Invalid input(s): AMYLASE,  ALB  PT/INR: No results for input(s): PROTIME, INR in the last 72 hours. APTT: No results for input(s): APTT in the last 72 hours. UA:  No results for input(s): NITRITE, COLORU, PHUR, LABCAST, WBCUA, RBCUA, MUCUS, TRICHOMONAS, YEAST, BACTERIA, CLARITYU, SPECGRAV, LEUKOCYTESUR, UROBILINOGEN, BILIRUBINUR, BLOODU, GLUCOSEU, AMORPHOUS in the last 72 hours. Invalid input(s): KETONESU  No results for input(s): PHART, MFC4CBR, PO2ART in the last 72 hours. Cultures:  7/24 BC NGTD  7/24 COVID-19 negative      Films:  CXR 7/24 bilateral lower lobe airspace disease, worse on the left    Echocardiogram 7/27/2020   This is a limited study for elevated troponin. Left ventricular systolic function is normal with ejection fraction   estimated at 55-60 %. No regional wall motion abnormalities are noted. There is mild concentric left ventricular hypertrophy. ASSESSMENT:  · Community-acquired pneumonia  · Bilateral pleural effusion on CT abdomen 7/27/2020-appears simple on appearance. Probably CHF related versus sympathetic. Unlikely parapneumonic. Risks outweigh benefits for thoracentesis at this time  · New onset A.  Fib  · Acute on chronic anemia  · Chronic kidney disease        PLAN:  · Supplemental oxygen to maintain SaO2 >92%; wean as tolerated  · Ceftriaxone and Zithromax day #5/7  · Consider trial of diuresis  · Cardiology following  · Chest x-ray in 4 to 6 weeks to document resolution of abnormalities

## 2020-07-28 NOTE — PROGRESS NOTES
BMP:   Recent Labs     07/27/20  0445 07/28/20  0505    135*   K 4.2 3.8    102   CO2 21 22   PHOS 3.4  --    BUN 20 19   CREATININE 1.1 1.1     BNP: No results for input(s): BNP in the last 72 hours. PT/INR: No results for input(s): PROTIME, INR in the last 72 hours. APTT: No results for input(s): APTT in the last 72 hours. CARDIAC ENZYMES:   No results for input(s): CKMB, CKMBINDEX, TROPONINI in the last 72 hours. Invalid input(s): CKTOTAL;3  FASTING LIPID PANEL:  Lab Results   Component Value Date    CHOL 143 09/18/2019    HDL 38 (L) 09/18/2019    TRIG 106 09/18/2019     LIVER PROFILE:   No results for input(s): AST, ALT, ALB, BILIDIR, BILITOT, ALKPHOS in the last 72 hours. Radiology  CT ABDOMEN PELVIS W IV CONTRAST Additional Contrast? None   Final Result   Bilateral pleural effusions with adjacent lung consolidation, either   atelectasis or pneumonia. Nonspecific bladder wall thickening. Recommend correlation with urinalysis   to exclude cystitis. XR CHEST PORTABLE   Final Result   Cardiomegaly with small bilateral pleural effusions, left side greater than   right. Bibasilar airspace disease which could represent atelectasis or pneumonia. Assessment:  Active Problems:    Generalized weakness    Community acquired pneumonia    New onset a-fib ([facility] [address]    Elevated troponin    Iron deficiency anemia    History of atherosclerotic heart disease    SAN (dyspnea on exertion)    Acute anemia  Resolved Problems:    * No resolved hospital problems. *      Plan:    #  Pneumonia,    community-acquired pneumonia suspect strep pneumonia or other gram positive organism  - bibasilar ASD  - cont IV ceftriaxone and azithro D#3.    - Chk resp cx.  F/u bld cx.   - COVID ruled out   - Pulmonology consulted. -Resume nebulizer treatments     #  A fib w/ mild RVR (rates up to the 100's), new onset. Rate controlled now.   - Cardiology consulted, monitor on tele, serial trops (0.04, 0.03). - Full dose Lovenox. HR controlled. Workup anemia, then consider AC. Check Echo     #  Microcytic, hypochromic anemia  - acute on chronic, HGB 7.8, last was 10.1 in Jan 2020. Tavia Castellanos was negative at [facility] is supposed to be on Fe supp but has not been taking recently.    - monitor H/H  - given advanced age not sure he is a candidate for workup. Has seen hematology before.      #  Indeterminate troponin, 0.04, tele and serial trops 0.04, 0.03.  Denies CP.   Check Echo     #  Clinical dehydration, IVF.  Monitor for vol OL.       #  Prolonged QTc, 484 ms, avoid QT prolonging agents as able.       #  HTN, controlled, cont home regimen.     #  CKD III, Cr stable.       #  Hx CAD, cont ASA.     DVT Prophylaxis: Full dose Lovenox    Diet: Diet dysphagia soft and bite-sized; carb control   Code Status: Full Code       Su Kimbrough MD 7/28/2020 11:12 AM

## 2020-07-28 NOTE — PROGRESS NOTES
Inpatient Physical Therapy Daily Treatment Note    Unit: PCU  Date:  7/28/2020  Patient Name:    Adriana Martinez  Admitting diagnosis:  Pneumonia [J18.9]  Admit Date:  7/24/2020  Precautions/Restrictions:  Fall risk, Bed/chair alarm, Lines -IV, Confusion, [place] (hard of hearing) and Telemetry      Discharge Recommendations: Home 24 hr assist and with home PT   DME needs for discharge: Needs Met       Therapy recommendation for EMS Transport: can transport by wheelchair    Therapy recommendations for staff:   Assist of 1 with use of rolling walker (RW) for all transfers and ambulation to/from bathroom    History of Present Illness: ED note, Julietkingadre Reason, 7/24/2020:   \" 12 y. o. male presents to the ED with cough, fatigue, feeling tired all week, and cough seem to be getting better, but daughter here with him states that he is just not himself, and getting more short of breath, worse with exertion, cannot even make it across his house without getting significantly dyspneic, no known fevers, no chest pain, no urinary changes, he does wear depends, and is mostly incontinent, denies dysuria/hematuria, denies any bowel changes, mostly has been soft and mushy, brown, no melena or hematochezia, he is not on any blood thinners.  He does have a history of heart problems, has not seen a cardiologist in a while, used to see a doctor at [facility], but had retired and has not been in a couple years, daughter here with him helps with the history, patient is very hard of hearing when asked about the bruising on his chest,  patient's daughter states he is always itching, scratching causing this,, no other complaints, modifying factors or associated symptoms. \"     Home Health S4 Level Recommendation: Level 1 Standard  AM-PAC Mobility Score   AM-PAC Inpatient Mobility Raw Score : 19     Treatment Time: 1152 - 1220  Treatment number: 2  Timed Code Treatment Minutes: 28 minutes  Total Treatment Minutes:  28  minutes    Cognition    A&O Person home PT upon discharge as patient functioning close to baseline level    Goals (all goals ongoing unless otherwise indicated)  To be met in 3 visits:  1). Independent with LE Ex x 10 reps     To be met in 6 visits:  1). Supine to/from sit: Independent  2). Sit to/from stand: SBA  3). Bed to chair: SBA  4). Gait: Ambulate 100 ft.   with  SBA and use of rolling walker (RW)  5). Tolerate B LE exercises 3 sets of 10-15 reps  6). Ascend/descend 1 steps with CGA with use of hand rail unilateral     Plan   Continue with plan of care. Signature: Loni Newman MS PT, # [phone]      If patient discharges from this facility prior to next visit, this note will serve as the Discharge Summary.

## 2020-07-28 NOTE — PROGRESS NOTES
Progress Note    Admit Date:  7/24/2020     Presented with c/o fatigue, cough, SAN. Admitted for pneumonia, A-fib RVR, anemia, and COVID rule out. COVID not detected. DC droplet plus precautions      Subjective:  Mr. Keya Lopez is feeling some better. ECHO to be done today. Still in a fib. CVR. Has seen GI and Oncology before for anemia and weight loss. Procedures deferred due to advanced age. He has not been compliant with iron at home. 7/28- GI has seen patient. Possible EGD and colonoscopy in am.    Objective:   /75   Pulse 93   Temp 96.9 °F (36.1 °C) (Oral)   Resp 17   Ht 5' 5\" (1.651 m)   Wt 163 lb 4.8 oz (74.1 kg)   SpO2 95%   BMI 27.17 kg/m²       Intake/Output Summary (Last 24 hours) at 7/28/2020 1112  Last data filed at 7/28/2020 2560  Gross per 24 hour   Intake 453 ml   Output --   Net 453 ml         Physical Exam:  General:  Awake, alert, NAD  Skin:  Warm and dry  Neck:  JVD absent. Neck supple  Chest: Diminished breath sounds,  Few bilateral expiratory wheezes present. No rales or rhonchi. Cardiovascular:  RRR ,S1S2 normal  Abdomen:  Soft, non tender, non distended, BS +  Extremities:  No edema. Intact peripheral pulses.  Brisk cap refill, < 2 secs  Neuro: non focal      Medications:   Scheduled Meds:   ipratropium-albuterol  1 ampule Inhalation 4x daily    aspirin  81 mg Oral Daily    ferrous sulfate  325 mg Oral Every Other Day    isosorbide dinitrate  10 mg Oral TID    rOPINIRole  1 mg Oral Nightly    sodium chloride flush  10 mL Intravenous 2 times per day    enoxaparin  1 mg/kg Subcutaneous BID    insulin lispro  0-6 Units Subcutaneous TID WC    insulin lispro  0-3 Units Subcutaneous Nightly    cefTRIAXone (ROCEPHIN) IV  1 g Intravenous Q24H    azithromycin  500 mg Intravenous Q24H       Continuous Infusions:   dextrose         Data:  CBC:   Recent Labs     07/27/20  0445 07/28/20  0505   WBC 6.7 8.6   RBC 3.75* 3.72*   HGB 7.4* 7.4*   HCT 26.5* 26.3*   MCV 70.6* 70.5* monitor on tele, serial trops (0.04, 0.03). - Full dose Lovenox. HR controlled. Workup anemia, then consider AC. Check Echo     #  Microcytic, hypochromic anemia  - acute on chronic, HGB 7.8, last was 10.1 in Jan 2020. Janna Connolly was negative at [facility] is supposed to be on Fe supp but has not been taking recently.    - monitor H/H  - given advanced age not sure he is a candidate for workup by GI will likely do EGD and colonoscopy in the a.m.. Has seen hematology before.      #  Indeterminate troponin, 0.04, tele and serial trops 0.04, 0.03.  Denies CP.   Check Echo     #  Clinical dehydration, IVF.  Monitor for vol OL.       #  Prolonged QTc, 484 ms, avoid QT prolonging agents as able.       #  HTN, controlled, cont home regimen.     #  CKD III, Cr stable.       #  Hx CAD, cont ASA.     DVT Prophylaxis: Full dose Lovenox    Diet: Diet dysphagia soft and bite-sized; carb control   Code Status: Full Code       Ana Cordero MD 7/28/2020 11:12 AM

## 2020-07-28 NOTE — PROGRESS NOTES
Occupational Therapy Daily Treatment Note    Unit: PCU  Date:  7/28/2020  Patient Name:    Kriste Dawson Seip  Admitting diagnosis:  Pneumonia [J18.9]  Admit Date:  7/24/2020  Precautions/Restrictions:  Fall risk, Bed/chair alarm, Lines -IV, [place] (hard of hearing) and Telemetry, COVID -  Patient cleared for session. RN states that patient was originally a PCU patient , transferred to [facility] for covid r/o. Patient will transfer later to PCU. COVID-19: Not detected, 7/26/2020       Discharge Recommendations: Home with 24/7 assist and home therapy  DME needs for discharge: Needs Met       Therapy recommendations for staff:   Assist of 1 (contact guard assist) with use of rolling walker (RW) and gait belt for all ambulation to/from bathroom    AM-PAC Score: AM-PAC Inpatient Daily Activity Raw Score: 18  Home Health S4 Level: Level 1- Standard       Treatment Time:  15:05-15:48  Treatment number:  2    Total Treatment Time:   43 minutes    History of Present Illness: ED note, Gad[place], 7/24/2020:   \" 89 y. o. male presents to the ED with cough, fatigue, feeling tired all week, and cough seem to be getting better, but daughter here with him states that he is just not himself, and getting more short of breath, worse with exertion, cannot even make it across his house without getting significantly dyspneic, no known fevers, no chest pain, no urinary changes, he does wear depends, and is mostly incontinent, denies dysuria/hematuria, denies any bowel changes, mostly has been soft and mushy, brown, no melena or hematochezia, he is not on any blood thinners.  He does have a history of heart problems, has not seen a cardiologist in a while, used to see a doctor at [facility], but had retired and has not been in a couple years, daughter here with him helps with the history, patient is very hard of hearing when asked about the bruising on his chest,  patient's daughter states he is always itching, scratching causing this,, no other complaints, modifying factors or associated symptoms. \"     Subjective:  Patient sitting on side of bed upon therapist arrival. Agreeable to treatment    Pain   No  Rating: NA  Location:  Pain Medicine Status: Denies need      Bed Mobility:   Supine to Sit:  Not Tested  Sit to Supine:  Not Tested  Rolling:           Not Tested  Scooting:        SBA    Transfer Training:   Sit to stand:   CGA  Stand to sit:  CGA  Bed to Chair:  CGA and with use of RW  Bed to [facility]:   Not Tested  Standard toilet:   Not Tested     Patient completed functional mobility to door and back with CGA and use of RW. Activity Tolerance   Pt completed therapy session with No adverse symptoms noted w/activity  SpO2: >95%  HR: 100  BP:     ADL Training:   Upper body dressing:  Min A  Upper body bathing:  Min A  Lower body dressing: Mod A to don briefs   Lower body bathing:  Min A  Toileting:   Not Tested  Grooming/Hygiene:  Min A provided at elbows while reaching to brush hair    Therapeutic Exercise: provided active assist for B shoulder flexion to increase ROM. Hand flex/ext:  x15  Wrist flex/ext:  x15  Shoulder flex/ext:  x15  Shoulder abd/add:  x15  Shoulder horizontal abd/add:  x15  Scapular retraction:  x15  shoulder elevation:  x15    Patient Education:   Role of OT  Recommendations for DC  Energy conservation techniques  Safe RW use/hand placement    Positioning Needs:   Up in chair, call light and needs in reach. Alarm Set    Family Present:  No    Assessment: Patient demonstrated good tolerance for treatment session. Patient demonstrate shortness of breath with activity but vital signs remained stable. Patient would benefit from continued skilled OT services. GOALS  To be met in 3 Visits:  1). Bed to toilet/BSC: SBA     To be met in 5 Visits:  1). Supine to/from Sit:             Independent  2). Upper Body Bathing:         SBA  3). Lower Body Bathing:         CGA  4). Upper Body Dressing:       SBA  5).  Lower Body Dressing: CGA  6).  Pt to demonstrate UE exs x 15 reps with minimal cues    Plan: cont with [facility], OTR/L #[id number]      If patient discharges from this facility prior to next visit, this note will serve as the Discharge Summary

## 2020-07-28 NOTE — PROGRESS NOTES
Pulse:  93  92   Resp:    16   Temp:  96.9 °F (36.1 °C)  97.2 °F (36.2 °C)   TempSrc:  Oral  Oral   SpO2: 92% 97% 95% 97%   Weight:       Height:           Physical Examination: General appearance - alert, well appearing, and in no distress  Mental status - alert, oriented to person, place, and time  Eyes - pupils equal and reactive, extraocular eye movements intact  Neck - supple, no significant adenopathy  Chest - clear to auscultation, no wheezes, rales or rhonchi, symmetric air entry  Heart - normal rate, regular rhythm, normal S1, S2, no murmurs, rubs, clicks or gallops  Abdomen - soft, nontender, nondistended, no masses or organomegaly  Extremities - no pedal edema noted        Impression: 80year old male with a history of DM, HTN, HLD, CAD, BPH, prostate cancer, CKD, cirrhosis, OA, and restless leg syndrome admitted with pneumonia. Hospital course complicated by new onset atrial fibrillation requiring anticoagulation, and anemia. CT confirms pneumonia, shows possible cystitis, and nonspecific thickening at the GE junction concerning for eosinophilic esophagitis, Grey's esophagus, and less likely malignancy. Recommendation:  1. Continue supportive care  2. Continue PPI qAM  3. Monitor Hgb  4. Observe for signs of bleeding  5. Monitor and document output  6. Prepare for EGD + colonoscopy tomorrow  7. Start clear liquid diet  8. Begin bowel prep  9. NPO after 5:00 AM tomorrow  10. Hold anticoagulation x 24 hours prior to procedure  11. PT/OT  12. Will follow      Wayne Luong PA-C  9:43 AM 7/28/2020                      80year old male with a history of DM, HTN, HLD, CAD, BPH, prostate cancer, CKD, cirrhosis, OA, and restless leg syndrome admitted with pneumonia. Hospital course complicated by new onset atrial fibrillation requiring anticoagulation, and anemia. CT negative. Continue supportive care.  EGD+colon tomorrow    Norris Parmar MD          [phone]  [phone]

## 2020-07-28 NOTE — PROGRESS NOTES
Recommended Diet and Intervention  Solids: Dysphagia Soft and Bite-Sized (Dysphagia III)  Liquid  Thin  Meds: Whole with water (1-2 at a time)      Speech Language Pathology  Facility/Department: [facility]  PCU TELEMETRY  Dysphagia Daily Treatment Note    NAME: Bong Wing  : 1924  MRN: [id number]    Patient Diagnosis(es):   Patient Active Problem List    Diagnosis Date Noted    Iron deficiency anemia     History of atherosclerotic heart disease     SAN (dyspnea on exertion)     Acute anemia     Community acquired pneumonia 2020    New onset a-fib ([address]     Elevated troponin     Constipation     Severe protein-calorie malnutrition ([address] 2020    Hypophosphatemia     Generalized weakness     Acute cystitis without hematuria     Adenocarcinoma of prostate ([address] 2019    BPH with urinary obstruction 2019    Hypoglycemia     Elevated LFTs     Acute encephalopathy 2019    Primary osteoarthritis of both knees 2017    Chronic kidney disease (CKD), stage III (moderate) ([address] 2016    Hyperlipidemia associated with type 2 diabetes mellitus ([facility] [address] 2015    Neoplasm of uncertain behavior of skin 2011    Hypertension     Benign prostatic hyperplasia     CAD (coronary artery disease)     Type 2 diabetes mellitus without complication (HCC)     Osteoarthrosis     Restless legs syndrome     Renal failure      Allergies: No Known Allergies  Onset Date: 2020  Subjective: Upon entry, Pt fast asleep. Pt awoke with sternal rubs and calling out of name. Pt lethargic, but cooperative to participate. Pain: no c/o pain    Current Diet: DIET CLEAR LIQUID;    Diet Tolerance:  Patient grossly tolerating current diet level without signs/symptoms of penetration / aspiration. P.O. Trials:   Thin   x Via cup   Nectar / Mildly Thick       Honey / Moderately Thick       Pudding / Extremely Thick       Puree       Solid         Dysphagia Treatment and Impressions: Pt asleep upon entry. Pt agreeable to tx following verbal cues. Pt seen upright in bed on RA with MD and RN intermittently in session. MD reports no concerns for swallow function this date. Pt reports no swallowing difficulty and no s/s of aspiration with current diet. Pt currently on Clear Liquids Only due to having colonoscopy tomorrow. Pt RR remained at 20/min before and after PO trials. Pt vocal quality remained clear following PO of thin liquids. mildly confused. Pt oriented with ST assistance. Continue ST f/u. Dysphagia Goals:  Timeframe for Long-term Goals: 7 days (8/1/20)   Pt will tolerate least restrictive diet without clinical s/s of aspiration. 07/28; Goal Addressed; Ongoing    Short-term Goals  Timeframe for Short-term Goals: 7 days (8/1/20)  Dysphagia Goals:  Goal 1: The patient will tolerate recommended diet without observed clinical signs of aspiration 07/28 Goal Addressed; Progressing  Goal 2: The patient will recall and perform compensatory strategies, with no cues 07/28 Goal Addressed; Ongoing. Pt required mod cues to recall compensatory strategies. Goal 3: The patient will tolerate regular consistency solids 10/10. 07/28 Goal Not Addressed; Pt on Clear Liquid Diet only this date due to having colonoscopy next day    Recommendations:  Recommended Diet and Intervention  Solids: Dysphagia Soft and Bite-Sized (Dysphagia III)  Liquid  Thin  Meds: Whole with water (1-2 at a time)    Patient/Family/Caregiver Education: Pt educated on safe swallowing strategies and role of ST. Pt required cues to recall strategies. Compensatory Strategies: Alternate solids and liquids;Eat/Feed slowly;Upright as possible for all oral intake;Remain upright for 30-45 minutes after meals;Small bites/sips    Plan:    Continued Dysphagia treatment with goals per plan of care.     Discharge Recommendations: TBD based on progress    If pt discharges from hospital prior to Speech/Swallowing discharge, this note serves as tx and discharge summary.      Total Treatment Time / Charges     Time in Time out Total Time / units   Cognitive Tx         Speech Tx      Dysphagia Tx 1107 1123 16 min/ 1 unit     Signature:    Lc Salinas  #[id number]  Speech Language Pathologist

## 2020-07-29 NOTE — PROGRESS NOTES
Occupational Therapy Daily Treatment Note    Unit: PCU  Date:  7/29/2020  Patient Name:    Jeralyn Chyle Seip  Admitting diagnosis:  Pneumonia [J18.9]  Admit Date:  7/24/2020  Precautions/Restrictions:  Fall risk, Bed/chair alarm, Lines -IV, [place] (hard of hearing) and Telemetry, COVID -    COVID-19: Not detected, 7/26/2020       Discharge Recommendations: Home with 24/7 assist and home therapy  DME needs for discharge: Needs Met       Therapy recommendations for staff:   Assist of 1 (contact guard assist) with use of rolling walker (RW) and gait belt for all ambulation to/from bathroom    AM-PAC Score: AM-PAC Inpatient Daily Activity Raw Score: 18  Home Health S4 Level: Level 1- Standard       Treatment Time:  543-710  Treatment number:  3   Total Treatment Time:   45 minutes    History of Present Illness: ED note, Gadiel, 7/24/2020:   \" 89 y. o. male presents to the ED with cough, fatigue, feeling tired all week, and cough seem to be getting better, but daughter here with him states that he is just not himself, and getting more short of breath, worse with exertion, cannot even make it across his house without getting significantly dyspneic, no known fevers, no chest pain, no urinary changes, he does wear depends, and is mostly incontinent, denies dysuria/hematuria, denies any bowel changes, mostly has been soft and mushy, brown, no melena or hematochezia, he is not on any blood thinners.  He does have a history of heart problems, has not seen a cardiologist in a while, used to see a doctor at [facility], but had retired and has not been in a couple years, daughter here with him helps with the history, patient is very hard of hearing when asked about the bruising on his chest,  patient's daughter states he is always itching, scratching causing this,, no other complaints, modifying factors or associated symptoms. \"     Subjective:  Patient resting in bed upon therapist arrival. Agreeable to treatment.     Pain   No  Rating: NA  Location:  Pain Medicine Status: Denies need      Bed Mobility:   Supine to Sit:  Min A with HOB elevated  Sit to Supine:  Not Tested  Rolling:           Not Tested  Scooting:        SBA    Transfer Training:   Sit to stand:   CGA from EOB, MIN A from toilet (2 trials) with cues for hand placement  Stand to sit:  CGA to chair, MIN A to toilet with cues for hand placement (2 trials)  Toilet to Chair:  CGA and with use of RW  Bed to [facility]:   Not Tested  Standard toilet:   CGA, with use of RW and VC for hand placement     Patient completed functional mobility to bathroom with CGA and use of RW. Too fatigued after moving bowels on toilet to complete functional mobility back to chair so chair brought to bathroom doorway and patient able to transfer to chair with CGA and cues with RW. Activity Tolerance   Pt completed therapy session with No adverse symptoms noted w/activity  Fatigued after moving bowels in preparation for colonoscopy. ADL Training:   Upper body dressing:  Not Tested  Upper body bathing:  Not Tested  Lower body dressing:  Max A to doff/don briefs   Lower body bathing:  Max A  Toileting: Max A hygiene after two large loose BMs   Grooming/Hygiene:  Not Tested     Therapeutic Exercise:   N/A    Patient Education:   Role of OT  Recommendations for DC  Energy conservation techniques  Safe RW use/hand placement    Positioning Needs:   Up in chair, call light and needs in reach. Alarm Set    Family Present:  No    Assessment: Patient limited by need to repeatedly move bowels this am in preparation for colonoscopy, fatigued after BMs. Patient would benefit from continued skilled OT services. GOALS-continue all  To be met in 3 Visits:  1). Bed to toilet/BSC: SBA     To be met in 5 Visits:  1). Supine to/from Sit:             Independent  2). Upper Body Bathing:         SBA  3). Lower Body Bathing:         CGA  4). Upper Body Dressing:       SBA  5). Lower Body Dressing:       CGA  6).  Pt to demonstrate UE exs x 15 reps with minimal cues    Plan: cont with [address], OTR/L [id number]        If patient discharges from this facility prior to next visit, this note will serve as the Discharge Summary

## 2020-07-29 NOTE — PROGRESS NOTES
Verbal phone consent obtained for patient for EGD/Colonoscopy procedure with patient healthcare POA/RONNIE Nogueira verified by this RN and Radha RN at this time. Update provided. Denies additional questions at this time.

## 2020-07-29 NOTE — PROGRESS NOTES
Patient awake and quiet, sitting up in chair, on RA. No s/s of distress noted. Patient denies pain when assessed. Call light within reach. Shift assessment completed. Snack and drink provided to patient per request at this time. Will continue to monitor.

## 2020-07-29 NOTE — H&P
capsule by mouth 2 times daily 1/21/20  Yes Jethro Sarmiento MD   magnesium hydroxide (MILK OF MAGNESIA) 400 MG/5ML suspension Take 30 mLs by mouth daily as needed for Constipation 1/21/20  Yes Jethro Sarmiento MD   ferrous sulfate 325 (65 Fe) MG tablet Take 325 mg by mouth every other day   Yes Historical Provider, MD [employer] Devices (MARIA ISABEL ROLLING WALKER [place]) MISC Use for ambulation 7/15/14  Yes Ricci Galvan MD   aspirin 81 MG tablet Take 1 tablet by mouth daily. 3/5/13  Yes John Almazan MD        Allergies: No Known Allergies    Nurses notes reviewed and agreed. Physical Exam:  Vital Signs: BP (!) 152/93   Pulse 114   Temp 98.5 °F (36.9 °C) (Temporal)   Resp 16   Ht 5' 5\" (1.651 m)   Wt 166 lb (75.3 kg)   SpO2 97%   BMI 27.62 kg/m²    Airway: Mallampati: II (soft palate, uvula, fauces visible)  Pulmonary:Normal  Cardiac:Normal  Abdomen:Normal    Pre-Procedure Assessment / Plan:  ASA: Class 3 - A patient with severe systemic disease that limits activity but is not incapacitating  Level of Sedation Plan: Moderate sedation  Post Procedure plan: Return to same level of care    I assessed the patient and find that the patient is in satisfactory condition to proceed with the planned procedure and sedation plan. I have explained the risk, benefits, and alternatives to the procedure; the patient understands and agrees to proceed.          Amaris Soto  7/29/2020

## 2020-07-29 NOTE — DISCHARGE SUMMARY
Name:  Ovidio Pelayo  Room:  /0355-07  MRN:    [id number]    Discharge Summary      This discharge summary is in conjunction with a complete physical exam done on the day of discharge. Discharging Physician: Alberta Sanders MD       Admit: 7/24/2020  Discharge:  7/29/2020    HPI taken from admission H&P:      The patient is a 80 y.o. male with PMH below, presents with fatigue, cough, SAN. Pt pt reports fatigue for the last week or so. He has had a cough over that time as well but it has become somewhat better over the last few days. He has also noted significant SAN over the last week. He reports getting winded with just a few steps. He is not requiring O2 at rest.  He was found to be fairly anemic in the ER w/ a neg guaiac. He is supposed to be on Fe supplementation but has not been taking recently. He denies CP. He was found to be in afib which appears to be new onset. Diagnoses this Admission and Hospital Course     Pneumonia   - community-acquired pneumonia suspect strep pneumonia or other gram positive organism  - bibasilar ASD  - cont'd IV ceftriaxone and azithro D#3.    - Chked resp cx.  F/u bld cx.   - COVID ruled out   - Pulmonology consulted.    -Resume nebulizer treatments  - discharged with Levaquin, albuterol and atrovent inhaler     A fib w/ mild RVR (rates up to the 100's), new onset. Rate controlled now. - Cardiology consulted, monitored on tele, serial trops (0.04, 0.03). - Full dose Lovenox. HR controlled. Workup anemia, then consider AC.  Check Echo - see below  - ASA at d/c, no additional AC as pt is anemic. Will await full biopsy results on the colonic stricture before committing him to anticoagulation.   Given his advanced age and anemia he would be a high risk for any type of anticoagulation.     Microcytic, hypochromic anemia  - acute on chronic, HGB 7.8, last was 10.1 in Jan 2020. Sharon Mendiola was negative at [facility] is supposed to be on Fe supp but has not been capsule  Commonly known as:  DYAZIDE  TAKE 1 CAPSULE ONCE DAILY           Where to Get Your Medications      These medications were sent to [facility], Jann Guidry  [phone] [place] [phone]  [address] [place] [id number]    Phone:  [phone]   · albuterol sulfate  (90 Base) MCG/ACT inhaler  · ipratropium 17 MCG/ACT inhaler  · levoFLOXacin 250 MG tablet         Discharged in stable condition to home. Follow Up: Follow up with PCP in 1 week.       More than 30 mts spent      Alma Salmon 7/29/2020 4:32 PM

## 2020-07-29 NOTE — BRIEF OP NOTE
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       Procedure(s):  EGD BIOPSY  COLONOSCOPY WITH BIOPSY    Surgeon(s):  Amaris Soto MD    Assistant:  * No surgical staff found *    Anesthesia: Monitor Anesthesia Care    Estimated Blood Loss (mL): Minimal    Complications: None    Specimens:   ID Type Source Tests Collected by Time Destination   A :  Tissue Biopsy SURGICAL PATHOLOGY Amaris Soto MD 7/29/2020 1159    B :  Tissue Biopsy SURGICAL PATHOLOGY Amaris Soto MD 7/29/2020 1207        Implants:  * No implants in log *      Drains: * No LDAs found *    Findings: small hiatal hernia, large amount of liquid in stomach, otherwise normal EGD, poor prep, incomplete colon, sigmoid stricture, probably malignant    Recommendation  1. Continue supportive care  2. Monitor Hgb  3. Await pathology  4. Check CEA  5. Will consider repeat colonoscopy if biopsy is non-diagnostic  6.  Will follow    Electronically signed by Amaris Soto MD on 7/29/2020 at 12:12 PM

## 2020-07-29 NOTE — ANESTHESIA POSTPROCEDURE EVALUATION
Department of Anesthesiology  Postprocedure Note    Patient: India Pedraza  MRN: [id number]  YOB: 1924  Date of evaluation: 7/29/2020  Time:  6:00 PM     Procedure Summary     Date:  07/29/20 Room / Location:  [facility] ENDO 01 / [facility]    Anesthesia Start:  6004 Anesthesia Stop:  1216    Procedures:       EGD BIOPSY (N/A )      COLONOSCOPY WITH BIOPSY (N/A ) Diagnosis:  (ANEMIA)    Surgeon:  Mary Ann Veras MD Responsible Provider:  Rosita Phillips MD    Anesthesia Type:  general ASA Status:  4          Anesthesia Type: general    Joellen Phase I: Joellen Score: 10    Joellen Phase II: Joellen Score: 10    Last vitals: Reviewed and per EMR flowsheets. Anesthesia Post Evaluation    Patient location during evaluation: PACU  Patient participation: complete - patient participated  Level of consciousness: awake and alert  Airway patency: patent  Nausea & Vomiting: no nausea and no vomiting  Complications: no  Cardiovascular status: blood pressure returned to baseline  Respiratory status: acceptable  Hydration status: euvolemic  Comments: VSS on transfer to phase 2 recovery. No anesthetic complications.

## 2020-07-29 NOTE — PROGRESS NOTES
Pulmonary Progress Note    CC: Shortness of breath    Subjective:   Room air  Appears comfortable  No cough          Intake/Output Summary (Last 24 hours) at 7/29/2020 0749  Last data filed at 7/29/2020 0345  Gross per 24 hour   Intake 2250 ml   Output --   Net 2250 ml       Exam:   /79   Pulse 109   Temp 98.2 °F (36.8 °C) (Oral)   Resp 19   Ht 5' 5\" (1.651 m)   Wt 166 lb 4.8 oz (75.4 kg)   SpO2 96%   BMI 27.67 kg/m²  on room air  Gen: No distress. Alert. Eyes: PERRL. No sclera icterus. No conjunctival injection. ENT: No discharge. Pharynx clear. Neck: Trachea midline. Normal thyroid. Resp: No accessory muscle use. Few basilar crackles. No wheezes. No rhonchi. CV: irregular rate. Regular rhythm. No murmur or rub. No edema. GI: Non-tender. Non-distended. No masses. No organomegaly. M/S: No cyanosis. No joint deformity. No clubbing.          Scheduled Meds:   ipratropium-albuterol  1 ampule Inhalation 4x daily    aspirin  81 mg Oral Daily    ferrous sulfate  325 mg Oral Every Other Day    isosorbide dinitrate  10 mg Oral TID    rOPINIRole  1 mg Oral Nightly    sodium chloride flush  10 mL Intravenous 2 times per day    enoxaparin  1 mg/kg Subcutaneous BID    insulin lispro  0-6 Units Subcutaneous TID WC    insulin lispro  0-3 Units Subcutaneous Nightly    cefTRIAXone (ROCEPHIN) IV  1 g Intravenous Q24H    azithromycin  500 mg Intravenous Q24H     Continuous Infusions:   dextrose       PRN Meds:  sodium chloride flush, acetaminophen **OR** acetaminophen, polyethylene glycol, glucose, dextrose, glucagon (rDNA), dextrose, prochlorperazine, albuterol sulfate HFA **AND** ipratropium **AND** MDI Treatment, perflutren lipid microspheres    Labs:  CBC:   Recent Labs     07/27/20  0445 07/28/20  0505 07/29/20  0447   WBC 6.7 8.6 9.3   HGB 7.4* 7.4* 7.7*   HCT 26.5* 26.3* 27.2*   MCV 70.6* 70.5* 72.4*    244 294     BMP:   Recent Labs     07/27/20  0445 07/28/20  050 07/29/20  0447    135* 135*   K 4.2 3.8 4.5    102 99   CO2 21 22 22   PHOS 3.4  --   --    BUN 20 19 20   CREATININE 1.1 1.1 1.2     LIVER PROFILE:   No results for input(s): AST, ALT, LIPASE, BILIDIR, BILITOT, ALKPHOS in the last 72 hours. Invalid input(s): AMYLASE,  ALB  PT/INR: No results for input(s): PROTIME, INR in the last 72 hours. APTT: No results for input(s): APTT in the last 72 hours. UA:  No results for input(s): NITRITE, COLORU, PHUR, LABCAST, WBCUA, RBCUA, MUCUS, TRICHOMONAS, YEAST, BACTERIA, CLARITYU, SPECGRAV, LEUKOCYTESUR, UROBILINOGEN, BILIRUBINUR, BLOODU, GLUCOSEU, AMORPHOUS in the last 72 hours. Invalid input(s): KETONESU  No results for input(s): PHART, ZIS5EUI, PO2ART in the last 72 hours. Cultures:  7/24 BC NGTD  7/24 COVID-19 negative      Films:  CXR 7/24 bilateral lower lobe airspace disease, worse on the left    Echocardiogram 7/27/2020   This is a limited study for elevated troponin. Left ventricular systolic function is normal with ejection fraction   estimated at 55-60 %. No regional wall motion abnormalities are noted. There is mild concentric left ventricular hypertrophy. ASSESSMENT:  · Community-acquired pneumonia  · Bilateral pleural effusion on CT abdomen 7/27/2020-appears simple on appearance. Probably CHF related versus sympathetic. Unlikely parapneumonic. Risks outweigh benefits for thoracentesis at this time  · New onset A.  Fib  · Acute on chronic anemia  · Chronic kidney disease        PLAN:  · Supplemental oxygen to maintain SaO2 >92%; wean as tolerated  · Ceftriaxone and Zithromax day #6/7  · Consider trial of diuresis  · Cardiology following  · Chest x-ray in 4 to 6 weeks to document resolution of abnormalities  · DC planning is okay with pulmonary

## 2020-07-29 NOTE — PROGRESS NOTES
[facility] Voiced understanding.

## 2020-07-29 NOTE — PROGRESS NOTES
Physical Therapy    Patient off the floor for EGD + colon procedure. Patient will be seen later as schedule permits and when appropriate for therapy. No charge.   Cici Fuentes, MS PT, # [id number]

## 2020-07-29 NOTE — ANESTHESIA PRE PROCEDURE
isosorbide dinitrate (ISORDIL) tablet 10 mg  10 mg Oral TID Dimitri Valdez MD   10 mg at 07/28/20 1914    rOPINIRole (REQUIP) tablet 1 mg  1 mg Oral Nightly Dimitri Valdez MD   1 mg at 07/28/20 2057    sodium chloride flush 0.9 % injection 10 mL  10 mL Intravenous 2 times per day Dimitri Valdez MD   10 mL at 07/29/20 0807    sodium chloride flush 0.9 % injection 10 mL  10 mL Intravenous PRN Dimitri Valdez MD        acetaminophen (TYLENOL) tablet 650 mg  650 mg Oral Q6H PRN Dimitri Valdez MD   650 mg at 07/29/20 0149    Or    acetaminophen (TYLENOL) suppository 650 mg  650 mg Rectal Q6H PRN Dimitri Valdez MD        polyethylene glycol [facility]) packet 17 g  17 g Oral Daily PRN Dimitri Valdez MD        enoxaparin (LOVENOX) injection 70 mg  1 mg/kg Subcutaneous BID Dimitri Valdez MD   70 mg at 07/28/20 2057    glucose (GLUTOSE) 40 % oral gel 15 g  15 g Oral PRN Dimitri Valdez MD        dextrose 50 % IV solution  12.5 g Intravenous PRN Dimitri Valdez MD        glucagon (rDNA) injection 1 mg  1 mg Intramuscular PRN Dimitri Valdez MD        dextrose 5 % solution  100 mL/hr Intravenous PRN Dimitri Valdez MD        prochlorperazine (COMPAZINE) injection 10 mg  10 mg Intravenous Q6H PRN Dimitri Valdez MD   10 mg at 07/28/20 2318    insulin lispro (HUMALOG) injection vial 0-6 Units  0-6 Units Subcutaneous TID WC Dimitri Valdez MD   1 Units at 07/29/20 0807    insulin lispro (HUMALOG) injection vial 0-3 Units  0-3 Units Subcutaneous Nightly Dimitri Vadlez MD   1 Units at 07/28/20 2102    cefTRIAXone (ROCEPHIN) 1 g IVPB in 50 mL D5W minibag  1 g Intravenous Q24H Dimitri Valdez MD   Stopped at 07/28/20 2127    azithromycin (ZITHROMAX) 500 mg in D5W 250ml addavial  500 mg Intravenous Q24H Dimitri Valdez MD   Stopped at 07/28/20 2338    albuterol sulfate  (90 Base) MCG/ACT inhaler 2 puff  2 puff Inhalation Q4H PRN Lissy Edward MD        And  ipratropium (ATROVENT HFA) 17 MCG/ACT inhaler 2 puff  2 puff Inhalation Q4H PRN Shayy Fitzgerald MD        perflutren lipid microspheres (DEFINITY) injection 1.65 mg  1.5 mL Intravenous ONCE PRN Patricia Menendez MD           Allergies:  No Known Allergies    Problem List:    Patient Active Problem List   Diagnosis Code    Hypertension I10    Benign prostatic hyperplasia N40.0    CAD (coronary artery disease) I25.10    Type 2 diabetes mellitus without complication (HCC) S62.6    Osteoarthrosis M19.90    Restless legs syndrome G25.81    Renal failure N19    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.5    Hyperlipidemia associated with type 2 diabetes mellitus (HCC) E11.69, E78.5    Chronic kidney disease (CKD), stage III (moderate) (HCC) N18.3    Primary osteoarthritis of both knees M17.0    Acute encephalopathy G93.40    Hypoglycemia E16.2    Elevated LFTs R94.5    BPH with urinary obstruction N40.1, N13.8    Adenocarcinoma of prostate ([facility] [address] C61    Acute cystitis without hematuria N30.00    Hypophosphatemia E83.39    Generalized weakness R53.1    Severe protein-calorie malnutrition (HCC) E43    Constipation K59.00    Community acquired pneumonia J18.9    New onset a-fib (HCC) I48.91    Elevated troponin R79.89    Iron deficiency anemia D50.9    History of atherosclerotic heart disease Z86.79    SAN (dyspnea on exertion) R06.09    Acute anemia D64.9    Pleural effusion J90       Past Medical History:        Diagnosis Date    Adenocarcinoma of prostate ([facility] [address] 12/18/2019    BPH (benign prostatic hypertrophy)     CAD (coronary artery disease)     Chronic kidney disease (CKD), stage III (moderate) (HCC)     Cirrhosis of liver ([facility] [address] [facility]     Diabetes mellitus type 2, controlled ([facility] [address] [facility] 7/28/2015    Hyperlipidemia     Hyperlipidemia associated with type 2 diabetes mellitus ([facility] [address] [facility] 11/30/2015    Hypertension     Neoplasm of uncertain behavior of skin 8/19/2011    Osteoarthrosis  Renal failure     Restless legs syndrome     SBO (small bowel obstruction) ([facility] 01/18/2020    SBO vs ileus    Type II or unspecified type diabetes mellitus without mention of complication, not stated as uncontrolled        Past Surgical History:        Procedure Laterality Date    TURP  08/14/2019    TURP N/A 8/14/2019    CYSTOSCOPY TRANSURETHRAL RESECTION PROSTATE, CYSTOLITHOPAXY performed by Eliecer Seals MD at [place] [id number] History:    Social History     Tobacco Use    Smoking status: Never Smoker    Smokeless tobacco: Never Used   Substance Use Topics    Alcohol use: No                                Counseling given: Not Answered      Vital Signs (Current):   Vitals:    07/29/20 0501 07/29/20 0715 07/29/20 0800 07/29/20 1019   BP:   (!) 143/76 (!) 152/93   Pulse:   103 114   Resp:   16 16   Temp:   97.5 °F (36.4 °C) 98.5 °F (36.9 °C)   TempSrc:   Oral Temporal   SpO2:  96% 93% 97%   Weight: 166 lb 4.8 oz (75.4 kg)   166 lb (75.3 kg)   Height:    5' 5\" (1.651 m)                                              BP Readings from Last 3 Encounters:   07/29/20 (!) 152/93   01/30/20 113/67   01/21/20 135/72       NPO Status: Time of last liquid consumption: 2100                        Time of last solid consumption: 2100                        Date of last liquid consumption: 07/28/20                        Date of last solid food consumption: 07/26/20    BMI:   Wt Readings from Last 3 Encounters:   07/29/20 166 lb (75.3 kg)   01/30/20 145 lb 3.2 oz (65.9 kg)   01/18/20 152 lb (68.9 kg)     Body mass index is 27.62 kg/m².     CBC:   Lab Results   Component Value Date    WBC 9.3 07/29/2020    RBC 3.76 07/29/2020    HGB 7.7 07/29/2020    HCT 27.2 07/29/2020    MCV 72.4 07/29/2020    RDW 20.5 07/29/2020     07/29/2020       CMP:   Lab Results   Component Value Date     07/29/2020    K 4.5 07/29/2020    K 4.4 07/25/2020    CL 99 07/29/2020    CO2 22 07/29/2020    BUN 20 07/29/2020 CREATININE 1.2 07/29/2020    GFRAA >60 07/29/2020    AGRATIO 1.2 07/25/2020    LABGLOM 56 07/29/2020    LABGLOM 43 03/07/2016    GLUCOSE 101 07/29/2020    GLUCOSE 82 12/01/2011    PROT 6.4 07/25/2020    PROT 7.0 01/15/2013    CALCIUM 8.3 07/29/2020    BILITOT 0.5 07/25/2020    ALKPHOS 116 07/25/2020    AST 23 07/25/2020    ALT 9 07/25/2020       POC Tests:   Recent Labs     07/29/20  0708   POCGLU 148*       Coags:   Lab Results   Component Value Date    PROTIME 12.4 01/18/2020    INR 1.07 01/18/2020    APTT 29.3 06/14/2019       HCG (If Applicable): No results found for: PREGTESTUR, PREGSERUM, HCG, HCGQUANT     ABGs: No results found for: PHART, PO2ART, AJD1BAA, KDK6USG, BEART, X3MDLMYY     Type & Screen (If Applicable):  No results found for: LABABO, LABRH    Drug/Infectious Status (If Applicable):  No results found for: HIV, HEPCAB    COVID-19 Screening (If Applicable):   Lab Results   Component Value Date    COVID19 Not Detected 07/24/2020         Anesthesia Evaluation   no history of anesthetic complications:   Airway: Mallampati: III  TM distance: >3 FB   Neck ROM: limited  Mouth opening: > = 3 FB Dental:          Pulmonary:   (+) pneumonia: resolved,  shortness of breath: chronic,                             Cardiovascular:    (+) hypertension:, CAD:, SAN:,                   Neuro/Psych:               GI/Hepatic/Renal:   (+) liver disease (cirrhosis):, renal disease: CRI,           Endo/Other:    (+) DiabetesType II DM, , : arthritis: OA., malignancy/cancer. Abdominal:           Vascular:                                        Anesthesia Plan      general     ASA 4     (Risks, benefits and alternatives of GA discussed with pt. Questions answered. Willing to proceed.)  Induction: intravenous. Anesthetic plan and risks discussed with patient.                       Neptali Reyes MD   7/29/2020

## 2020-07-29 NOTE — PROGRESS NOTES
Tap water enema administered to patient at this time per GI MD. Patient tolerated well, small specs of stool observed. Patient passing gas. Active bowel sounds. Patient provided kennedy care, changed and repositioned. Call light within reach, will continue to monitor.

## 2020-07-29 NOTE — CARE COORDINATION
DISCHARGE ORDER  Date/Time 2020 2:44 PM  Completed by: Eusebio Reyes, Case Management    Patient Name: Chelsie Lama    : 1924  Admitting Diagnosis: Pneumonia [J18.9]      Admit order Date and Status:inpt  (verify MD's last order for status of admission)      Noted discharge order. Confirmed discharge plan  (pt): Yes    Discharge Plan: Reviewed chart. Met with the pt and CM spoke with pt's Chrissy TRUJILLO, via TC with pt's permission and she will be here this evening to transport the pt home. Pt and family declines hhc at this time. Reviewed chart. Role of discharge planner explained and patient verbalized understanding. Discharge order is noted. Has Home O2 in place on admit:  No  Informed of need to bring portable home O2 tank on day of discharge for nursing to connect prior to leaving:   Not Indicated  Verbalized agreement/Understanding:   Not Indicated  Pt is being d/c'd to home today. Pt's O2 sats are 96% on RA. Discharge timeout done with JA Dodge. All discharge needs and concerns addressed.

## 2020-07-29 NOTE — PROGRESS NOTES
AM assessment completed. Scheduled medications on hold for colonoscopy. VSS room air, confused oriented to self, denies any needs at this time. Call light in reach, will monitor. Patient lying in bed awake.

## 2020-07-30 NOTE — OP NOTE
direct visualization to the second portion of the  duodenum. The entire mucosa of the esophagus, stomach (retroflex and  forward views), duodenum (bulb, sweep, and second portion) were examined  carefully during withdrawal.    While the patient was lying in the left lateral decubitus position, the  bed was repositioned, and a standard pediatric colonoscope was inserted  in the anus and advanced to the sigmoid colon. The scope was unable to  traverse the sigmoid stricture. In addition, the prep was extremely  poor. Scope was withdrawn and procedure was deemed to be incomplete. The patient tolerated the procedure well without any difficulties. FINDINGS:  ESOPHAGUS:  The entire esophagus appeared normal.  There was a 3 cm  hiatal hernia in the distal esophagus. There are retained contents in  the esophagus, also suggestive of reflux. STOMACH:  There was over 500 mL of liquid in the stomach consistent with  gastric retention, otherwise normal stomach both in forward and  retroflexed views. DUODENUM:  Examined portion of the duodenum appeared normal.  Biopsies  were taken from second portion to rule out celiac sprue. RECTUM:  The digital rectal exam was normal.  No masses were felt. COLON:  There was a large amount of solid stool throughout the rectum  and sigmoid colon. There was a severe stricture in the sigmoid colon at  25 cm from entry. The scope was unable to traverse the stricture. The  stricture did have a malignant appearance. Biopsies were taken. Scope  was withdrawn and procedure was terminated due to inadequate prep and  inability to traverse the sigmoid stricture. Retroflexed view of the  rectum was normal.    SUMMARY:  1.  Large amount of fluid in the stomach concerning for gastric  retention. 2.  No evidence of gastric outlet obstruction. 3.  Small hiatal hernia. 4.  Normal EGD otherwise. 5.  Inadequate colon prep. 6.  Sigmoid stricture, likely malignant.   7.  Incomplete colonoscopy due to poor prep. RECOMMENDATIONS:  1. Return the patient to floor for continuous medical care. 2.  Await pathology results. 3.  Check CEA level. 4.  We will consider repeat colonoscopy with a 2-day prep if biopsies  were nondiagnostic. 5.  Resume diet and advance to low-fiber.     EBL: <5mL    Gayle Olguin MD    D: 07/29/2020 12:19:38       T: 07/29/2020 12:31:54     [id number]  Job#: [id number]     Doc#: [id number]    CC:  MD Lily Teague MD

## 2020-07-30 NOTE — CARE COORDINATION
[facility] Transitions Initial Follow Up Call    Call within 2 business days of discharge: Yes    Patient: Elizabeth Molina Patient : 1924   MRN: [id number]  Reason for Admission: fatigue, anemia, new A Fib  Discharge Date: 20 RARS: Readmission Risk Score: 25      Last Discharge [facility]       Complaint Diagnosis Description Type Department Provider    20 Fatigue Generalized weakness . .. ED to Hosp-Admission (Discharged) (Meribeth Anthony) Devonte Sniedr MD; [place]. .. Spoke with: Dakota Lacy (DIL/cg)    Facility: [facility]    Non-face-to-face services provided:  Obtained and reviewed discharge summary and/or continuity of care documents  Education of patient/family/caregiver/guardian to support self-management-s/s, DB&C  Assessment and support for treatment adherence and medication management-1111F completed    Challenges to be reviewed by the provider   Additional needs identified to be addressed with provider Yes  [facility] - will [facility] at [facility]    COVID-19 testing was negative. Patient informed of results, if available? No       Method of communication with provider : none    Advance Care Planning:   Does patient have an Advance Directive:  no ACP doc. Was this a readmission? No  Patient stated reason for admission: fatigue, cough  Patients top risk factors for readmission: functional physical ability and medical condition    Care Transition Nurse (CTN) contacted the caregiver by telephone to perform post hospital discharge assessment. Verified name and  with caregiver as identifiers. Provided introduction to self, and explanation of the CTN role. CTN reviewed discharge instructions, medical action plan and red flags with caregiver who verbalized understanding. Caregiver given an opportunity to ask questions and does not have any further questions or concerns at this time.  Were discharge instructions available to patient? Yes. Reviewed appropriate site of care based on symptoms and resources available to patient including: PCP. The caregiver agrees to contact the PCP office for questions related to their healthcare. Medication reconciliation was performed with caregiver, who verbalizes understanding of administration of home medications. Advised obtaining a 90-day supply of all daily and as-needed medications. Covid Risk Education    Patient has following risk factors of: pneumonia, diabetes, chronic kidney disease and liver cirrhosis. Education provided regarding infection prevention, and signs and symptoms of COVID-19 and when to seek medical attention with caregiver who verbalized understanding. Discussed exposure protocols and quarantine From CDC: Are you at higher risk for severe illness?   and given an opportunity for questions and concerns. The caregiver agrees to contact the COVID-19 hotline [phone] or PCP office for questions related to COVID-19. For more information on steps you can take to protect yourself, see CDC's How to Protect Yourself     Discussed follow-up appointments. If no appointment was previously scheduled, appointment scheduling offered: RONNIE has a call to PCP office for appt to be next Mon or Tues. Is follow up appointment scheduled within 7 days of discharge? Will be  Non-[facility] follow up appointment(s): NA    Plan for follow-up call in 5-7 days based on severity of symptoms and risk factors. Plan for next call: symptom management-SOB    Spoke with [facility], RONNIE who is primary cg, but she works M-F 9a-6p. Chrissy's spouse is home with Vanesa Rocha. States he continues with SOB and needs assistance with ADLs. Taking meds as prescribed. Instructed her to  in in DB&C and rest breaks with activities, use inhalers. She is waiting for PCP office to call back with appt time for Mon or Tues next week.  They declined HC at discharge but she is reconsidering as he needed more assistance than usual. She will decide and discuss at [facility] visit. CTN provided contact information for future needs.     Care Transitions 24 Hour Call    Schedule Follow Up Appointment with PCP:  Completed  Do you have any ongoing symptoms?:  Yes  Patient-reported symptoms:  Shortness of Breath  Interventions for patient-reported symptoms:  Other  Do you have a copy of your discharge instructions?:  Yes  Do you have all of your prescriptions and are they filled?:  Yes  Have you been contacted by a [address]?:  No  Have you scheduled your follow up appointment?:  Yes  How are you going to get to your appointment?:  Car - family or friend to transport  Were you discharged with any Home Care or Post Acute Services:  No  Patient DME:  [employer] chair  Do you have support at home?:  Child  Do you feel like you have everything you need to keep you well at home?:  Yes  Are you an active caregiver in your home?:  No  Care Transitions Interventions     Other Therapy Services:  Declined      Other Services:  Declined          Follow Up  Future Appointments   Date Time Provider Alexi Chavis   9/3/2020  9:50 AM Nish Nieto MD [facility] None       Eugenia Ortega RN

## 2020-07-31 NOTE — TELEPHONE ENCOUNTER
RN Access contacted Dr. Dawit Locke Office to schedule ED f/u appt. Pt has appt scheduled for Mon 8-3 @1:40pm with Dr. Dawit Locke.

## 2020-07-31 NOTE — ED NOTES
Pt alert and without s/s distress or discomfort, resps even and unlab, cardiac monitor applied, denies SOB, fever, cough or further c/o's     Niranjan Norman RN  07/31/20 1113

## 2020-07-31 NOTE — ED PROVIDER NOTES
[facility] EMERGENCY DEPARTMENT    CHIEF COMPLAINT  Shortness of Breath (Pt with reported SOB this AM per family members)       HISTORY OF PRESENT ILLNESS  Mauri Cortés is a 80 y.o. male who presents to the ED from home by EMS with complaint of shortness of breath. The patient presents by EMS who reports normal pulse ox on room air en route. They placed an IV but no other interventions en route. The patient himself is essentially without complaint and specifically denies fever, chills, chest pain, shortness of breath, nausea, vomiting, diarrhea, abdominal pain. He complains of occasional cough. Chart review reveals patient was discharged from hospital 7/29/2020 after admission for community-acquired pneumonia. Was ruled out for COVID-19. Discharged with Levaquin, albuterol, Atrovent inhaler. No other complaints, modifying factors or associated symptoms.      I have reviewed the following from the nursing documentation:    Past Medical History:   Diagnosis Date    Adenocarcinoma of prostate ([address] 12/18/2019    BPH (benign prostatic hypertrophy)     CAD (coronary artery disease)     Chronic kidney disease (CKD), stage III (moderate) (HCC)     Cirrhosis of liver ([address]     Diabetes mellitus type 2, controlled ([address] 7/28/2015    Hyperlipidemia     Hyperlipidemia associated with type 2 diabetes mellitus ([address] 11/30/2015    Hypertension     Neoplasm of uncertain behavior of skin 8/19/2011    Osteoarthrosis     Renal failure     Restless legs syndrome     SBO (small bowel obstruction) ([address] 01/18/2020    SBO vs ileus    Type II or unspecified type diabetes mellitus without mention of complication, not stated as uncontrolled      Past Surgical History:   Procedure Laterality Date    COLONOSCOPY N/A 7/29/2020    COLONOSCOPY WITH BIOPSY performed by Dario Hollis MD at [address] [facility] TURP  08/14/2019    TURP N/A 8/14/2019    CYSTOSCOPY TRANSURETHRAL RESECTION PROSTATE, CYSTOLITHOPAXY performed by Mell Watkins MD at [facility] N/A 7/29/2020    EGD BIOPSY performed by Mary Ann Veras MD at [facility]     Family History   Problem Relation Age of Onset    Cancer Mother         Bone CA    Stroke Father      Social History     Socioeconomic History    Marital status:      Spouse name: Not on file    Number of children: Not on file    Years of education: Not on file    Highest education level: Not on file   Occupational History    Not on file   Social Needs    Financial resource strain: Not on file    Food insecurity     Worry: Not on file     Inability: Not on file    Transportation needs     Medical: Not on file     Non-medical: Not on file   Tobacco Use    Smoking status: Never Smoker    Smokeless tobacco: Never Used   Substance and Sexual Activity    Alcohol use: No    Drug use: No    Sexual activity: Not Currently     Partners: Female   Lifestyle    Physical activity     Days per week: Not on file     Minutes per session: Not on file    Stress: Not on file   Relationships    Social connections     Talks on phone: Not on file     Gets together: Not on file     Attends Rastafarian service: Not on file     Active member of club or organization: Not on file     Attends meetings of clubs or organizations: Not on file     Relationship status: Not on file    Intimate partner violence     Fear of current or ex partner: Not on file     Emotionally abused: Not on file     Physically abused: Not on file     Forced sexual activity: Not on file   Other Topics Concern    Not on file   Social History Narrative    Not on file     No current facility-administered medications for this encounter.       Current Outpatient Medications   Medication Sig Dispense Refill    levoFLOXacin (LEVAQUIN) 250 MG tablet Take 1 tablet by mouth daily for 3 days 3 tablet 0    ipratropium (ATROVENT HFA) 17 MCG/ACT inhaler Inhale 2 puffs into the lungs every 4 hours as needed for Wheezing 1 Inhaler 3    albuterol sulfate  (90 Base) MCG/ACT inhaler Inhale 2 puffs into the lungs every 4 hours as needed for Wheezing 1 Inhaler 3    isosorbide dinitrate (ISORDIL) 10 MG tablet TAKE 1 TABLET BY MOUTH 3 TIMES A DAY 90 tablet 0    triamterene-hydroCHLOROthiazide (DYAZIDE) 37.5-25 MG per capsule TAKE 1 CAPSULE ONCE DAILY 90 capsule 0    glimepiride (AMARYL) 2 MG tablet TAKE 1 TABLET TWICE DAILY 180 tablet 0    rOPINIRole (REQUIP) 1 MG tablet TAKE ONE TABLET AT BEDTIME 30 tablet 2    lactobacillus (CULTURELLE) capsule Take 1 capsule by mouth 2 times daily 30 capsule 0    magnesium hydroxide (MILK OF MAGNESIA) 400 MG/5ML suspension Take 30 mLs by mouth daily as needed for Constipation 355 mL 0    ferrous sulfate 325 (65 Fe) MG tablet Take 325 mg by mouth every other day      Misc. Devices (MARIA ISABEL ROLLING WALKER ELITE) MISC Use for ambulation 1 each 0    aspirin 81 MG tablet Take 1 tablet by mouth daily. 30 tablet      No Known Allergies    REVIEW OF SYSTEMS  10 systems reviewed, pertinent positives and negatives per HPI, otherwise noted to be negative. PHYSICAL EXAM  ED Triage Vitals   Enc Vitals Group      BP 07/31/20 1300 113/65      Pulse 07/31/20 1300 89      Resp 07/31/20 1300 22      Temp 07/31/20 1309 98.3 °F (36.8 °C)      Temp Source 07/31/20 1309 Oral      SpO2 07/31/20 1300 97 %      Weight 07/31/20 1309 166 lb (75.3 kg)      Height 07/31/20 1309 5' 5\" (1.651 m)      Head Circumference --       Peak Flow --       Pain Score --       Pain Loc --       Pain Edu? --       Excl. in [address]? --      General appearance: Awake and alert. Cooperative. No acute distress. HENT: Normocephalic. Atraumatic. Mucous membranes are moist.  Neck: Supple. Eyes: PERRL. EOMI. Heart/Chest: irregularly irregular rhythm, reg rate   Lungs: Respirations unlabored. Crackles at bilateral bases. Good air exchange. Speaking comfortably in full sentences. Abdomen: Soft. Non-tender. Non-distended. No rebound or guarding. Musculoskeletal: No extremity edema. No deformity. No tenderness in the extremities. All extremities neurovascularly intact. Skin: Warm and dry. No acute rashes. Neurological: Alert and oriented. CN II-XII intact. Strength 5/5 bilateral upper and lower extremities. Sensation intact to light touch. Psychiatric: Mood/affect: normal      LABS  I have reviewed all labs for this visit.    Results for orders placed or performed during the hospital encounter of 07/31/20   CBC Auto Differential   Result Value Ref Range    WBC 8.8 4.0 - 11.0 K/uL    RBC 3.68 (L) 4.20 - 5.90 M/uL    Hemoglobin 7.6 (L) 13.5 - 17.5 g/dL    Hematocrit 26.0 (L) 40.5 - 52.5 %    MCV 70.7 (L) 80.0 - 100.0 fL    MCH 20.6 (L) 26.0 - 34.0 pg    MCHC 29.1 (L) 31.0 - 36.0 g/dL    RDW 21.3 (H) 12.4 - 15.4 %    Platelets 950 901 - 463 K/uL    MPV 7.8 5.0 - 10.5 fL    Neutrophils % 75.5 %    Lymphocytes % 11.7 %    Monocytes % 12.2 %    Eosinophils % 0.0 %    Basophils % 0.6 %    Neutrophils Absolute 6.6 1.7 - 7.7 K/uL    Lymphocytes Absolute 1.0 1.0 - 5.1 K/uL    Monocytes Absolute 1.1 0.0 - 1.3 K/uL    Eosinophils Absolute 0.0 0.0 - 0.6 K/uL    Basophils Absolute 0.1 0.0 - 0.2 K/uL   Comprehensive Metabolic Panel w/ Reflex to MG   Result Value Ref Range    Sodium 135 (L) 136 - 145 mmol/L    Potassium reflex Magnesium 4.4 3.5 - 5.1 mmol/L    Chloride 103 99 - 110 mmol/L    CO2 24 21 - 32 mmol/L    Anion Gap 8 3 - 16    Glucose 92 70 - 99 mg/dL    BUN 26 (H) 7 - 20 mg/dL    CREATININE 1.3 0.8 - 1.3 mg/dL    GFR Non- 51 (A) >60    GFR African American >60 >60    Calcium 8.4 8.3 - 10.6 mg/dL    Total Protein 6.1 (L) 6.4 - 8.2 g/dL    Alb 3.5 3.4 - 5.0 g/dL    Albumin/Globulin Ratio 1.3 1.1 - 2.2    Total Bilirubin 0.5 0.0 - 1.0 mg/dL    Alkaline Phosphatase 93 40 - 129 U/L    ALT 23 10 - 40 U/L    AST 44 (H) 15 - 37 U/L    Globulin 2.6 g/dL   Troponin   Result Value Ref Range    Troponin 0.03 (H) <0.01 ng/mL   Brain Natriuretic Peptide   Result Value Ref Range    Pro-BNP 3,675 (H) 0 - 449 pg/mL   Blood gas, venous   Result Value Ref Range    pH, Rick 7.433 7.350 - 7.450    pCO2, Rick 33.9 (L) 40.0 - 50.0 mmHg    pO2, Rick 56.7 (H) 25.0 - 40.0 mmHg    HCO3, Venous 22.1 (L) 23.0 - 29.0 mmol/L    Base Excess, Rick -1.7 -3.0 - 3.0 mmol/L    O2 Sat, Rick 91 Not Established %    Carboxyhemoglobin 7.3 (H) 0.0 - 1.5 %    MetHgb, Rick 0.3 <1.5 %    TC02 (Calc), Rick 23 Not Established mmol/L    O2 Content, Rick 10 Not Established VOL %    O2 Therapy Unknown    EKG 12 Lead   Result Value Ref Range    Ventricular Rate 88 BPM    Atrial Rate 101 BPM    QRS Duration 88 ms    Q-T Interval 394 ms    QTc Calculation (Bazett) 476 ms    R Axis 47 degrees    T Axis 39 degrees    Diagnosis       Atrial fibrillation with premature ventricular or aberrantly conducted complexesNonspecific ST abnormalityNo previous ECGs availableConfirmed by MELODY LARSON MD ([id number]) on 7/31/2020 5:44:44 PM       RADIOLOGY  I have reviewed all radiographic studies for this visit. XR CHEST PORTABLE   Final Result   Effusions with interstitial prominence and cardiomegaly suggesting volume   overload. ECG  EKG interpreted by myself. Rate: normal  Rhythm: a-fib with PVCs  Axis: normal  Intervals: within normal limits  ST Segments: no acute abnormality  T waves: no acute abnormality  Comparison: Compared to 7/24/2020, there is no significant change  Impression: Atrial fibrillation with premature ventricular complexes    ED COURSE/MDM  Patient seen and evaluated. Old records reviewed. Labs and imaging reviewed and results discussed with patient/family to extent possible. This is a 42-year-old gentleman who presents with familial concern for shortness of breath. However, on patient arrival, patient is entirely without complaint. His vital signs are within normal limits.   Exam is notable for irregularly irregular heart rate consistent with patient's known history of atrial fibrillation. Pulmonary exam is notable for crackles at the bilateral bases. There is in the setting of recently diagnosed community-acquired pneumonia for which the patient underwent course of antibiotics. He was also treated with IV fluids for dehydration. Chest x-ray reveals effusions with interstitial prominence and cardiomegaly suggestive of volume overload. I suspect there may be a component of slight volume overload in the setting of recent treatment for dehydration. In any event, the patient is maintaining adequate oxygenation on room air. Renal panel reveals mild hyponatremia, creatinine 1.3 from baseline 1.1-1.2. Would favor not diuresing the patient given his recent dehydrated status and given the patient is currently maintaining adequate oxygenation on room air with no increased work of breathing. Troponin is elevated at 0.03 which is a chronic finding for the patient. BNP is elevated at 3600 suggestive of some component of heart failure at play. Patient is anemic with hemoglobin 7.6 however this is at around the patient's baseline and there is no active source of bleeding noted. VBG generally reassuring. Given patient is asymptomatic and given the reassuring exam and overall reassuring diagnostics believe patient is most appropriate for further management in the outpatient setting. Particularly the risk of COVID-19 pandemic ongoing will opt to have patient follow closely with PCP in the outpatient setting and will advise usual strict return precautions for new or worsening symptoms. During the patient's ED course, the patient was given:  Medications - No data to display     All questions were answered and the patient/family expressed understanding and agreement with the plan. PROCEDURES  None    CRITICAL CARE  N/A    CLINICAL IMPRESSION  1.  Shortness of breath        DISPOSITION   Discharge     Sukhwinder Stokes MD    Note: This chart was created using voice recognition dictation software. Efforts were made by me to ensure accuracy, however some errors may be present due to limitations of this technology and occasionally words are not transcribed correctly.         Nirali Torres MD  07/31/20 1819       Nirali Torres MD  07/31/20 Sarah Kurtz

## 2020-08-03 NOTE — PROGRESS NOTES
Post-Discharge Transitional Care Management Services or Hospital Follow Up      Cammy Gayle   YOB: 1924    Date of Office Visit:  8/3/2020  Date of Hospital Admission: 7/31/20  Date of Hospital Discharge: 7/31/20  Risk of hospital readmission (high >=14%.  Medium >=10%) :Readmission Risk Score: 25      Care management risk score Rising risk (score 2-5) and Complex Care (Scores >=6): 6     Non face to face  following discharge, date last encounter closed (first attempt may have been earlier): 7/30/2020 10:21 AM    Call initiated 2 business days of discharge: Yes    Patient Active Problem List   Diagnosis    Hypertension    Benign prostatic hyperplasia    CAD (coronary artery disease)    Type 2 diabetes mellitus without complication ([address]    Osteoarthrosis    Restless legs syndrome    Renal failure    Neoplasm of uncertain behavior of skin    Hyperlipidemia associated with type 2 diabetes mellitus ([address]    Chronic kidney disease (CKD), stage III (moderate) (HCC)    Primary osteoarthritis of both knees    Acute encephalopathy    Hypoglycemia    Elevated LFTs    BPH with urinary obstruction    Adenocarcinoma of prostate ([address]    Acute cystitis without hematuria    Hypophosphatemia    Generalized weakness    Severe protein-calorie malnutrition (HCC)    Constipation    Community acquired pneumonia    New onset a-fib (HCC)    Elevated troponin    Iron deficiency anemia    History of atherosclerotic heart disease    SAN (dyspnea on exertion)    Acute anemia    Pleural effusion       No Known Allergies    Medications listed as ordered at the time of discharge from hospital   Seip, Jackquline Necessary R   Home Medication Instructions CIRA:    Printed on:08/03/20 5800   Medication Information                      albuterol sulfate  (90 Base) MCG/ACT inhaler  Inhale 2 puffs into the lungs every 4 hours as needed for Wheezing             aspirin 81 MG tablet  Take 1 tablet by mouth daily. ferrous sulfate 325 (65 Fe) MG tablet  Take 325 mg by mouth every other day             glimepiride (AMARYL) 2 MG tablet  TAKE 1 TABLET TWICE DAILY             ipratropium (ATROVENT HFA) 17 MCG/ACT inhaler  Inhale 2 puffs into the lungs every 4 hours as needed for Wheezing             isosorbide dinitrate (ISORDIL) 10 MG tablet  TAKE 1 TABLET BY MOUTH 3 TIMES A DAY             lactobacillus (CULTURELLE) capsule  Take 1 capsule by mouth 2 times daily             magnesium hydroxide (MILK OF MAGNESIA) 400 MG/5ML suspension  Take 30 mLs by mouth daily as needed for Constipation             Misc. Devices ([address] [place]) MISC  Use for ambulation             rOPINIRole (REQUIP) 1 MG tablet  TAKE ONE TABLET AT BEDTIME             triamterene-hydroCHLOROthiazide (DYAZIDE) 37.5-25 MG per capsule  TAKE 1 CAPSULE ONCE DAILY                   Medications marked \"taking\" at this time  No outpatient medications have been marked as taking for the 8/3/20 encounter (Office Visit) with Geraldo Kurtz MD.        Medications patient taking as of now reconciled against medications ordered at time of hospital discharge: Yes    No chief complaint on file. History of Present illness - Follow up of Hospital diagnosis(es):    Diagnosis Orders   1. Community acquired pneumonia, unspecified laterality     2. Iron deficiency anemia secondary to inadequate dietary iron intake     3. Atrial fibrillation with rapid ventricular response ([address]     4. Colonic stricture [facility])           Inpatient course: Discharge summary reviewed- see chart. Interval history/Current status: fair         Vitals:    08/03/20 1331   Weight: 154 lb (69.9 kg)   Height: 5' 5\" (1.651 m)     Body mass index is 25.63 kg/m².    Wt Readings from Last 3 Encounters:   08/03/20 154 lb (69.9 kg)   07/31/20 166 lb (75.3 kg)   07/29/20 166 lb (75.3 kg)     BP Readings from Last 3 Encounters:   07/31/20 120/77   07/29/20 119/81 07/29/20 (!) 97/55        Physical Exam:  General Appearance: alert and oriented to person, place and time, well developed and well- nourished, in no acute distress  Skin: warm and dry, no rash or erythema  Head: normocephalic and atraumatic  Pulmonary/Chest: clear to auscultation bilaterally- no wheezes, rales or rhonchi, normal air movement, no respiratory distress  Cardiovascular: normal rate, regular rhythm, normal S1 and S2, 3/6 systolic  murmurs,no rubs, clicks, or gallops, distal pulses intact, no carotid bruits  Abdomen: soft, non-tender, non-distended, normal bowel sounds, no masses or organomegaly  Extremities: no cyanosis, clubbing. 1 + pedal  edema      Assessment/Plan:   Diagnosis Orders   1. Community acquired pneumonia, unspecified laterality     2. Iron deficiency anemia secondary to inadequate dietary iron intake     3. Atrial fibrillation with rapid ventricular response ([address]     4. Colonic stricture (HCC)       Finished Abx. Doing well. No fever. Iron def anemia   colonic stricture  Has appt with surgery in a few days.     Now in sinus rhythm    Tramadol bid prn for leg pain/OA      Medical Decision Making: high complexity

## 2020-08-06 NOTE — PROGRESS NOTES
New Patient Via Ludin Huizar MD    [address], [address] [facility]  [place], [facility]  [address]   YOB: 1924    Date of Visit:  8/6/2020    Jarrett Peters MD    Chief Complaint: Colon mass    HPI: Patient presents for evaluation of a receive diagnosed colon cancer. He is not able to give much history, so this was obtained from his granddaughter. Apparently was recently hospitalized with pneumonia and new onset atrial fibrillation. He was anemic as well. He has been very fatigued lately. His bowels have been working normally. He has not had any rectal bleeding. However, he was recently diagnosed with a colon cancer    No Known Allergies  Outpatient Medications Marked as Taking for the 8/6/20 encounter (Initial consult) with Ashish Ordonez MD   Medication Sig Dispense Refill    traMADol (ULTRAM) 50 MG tablet Take 1 tablet by mouth every 12 hours for 30 days. 60 tablet 0    ipratropium (ATROVENT HFA) 17 MCG/ACT inhaler Inhale 2 puffs into the lungs every 4 hours as needed for Wheezing 1 Inhaler 3    albuterol sulfate  (90 Base) MCG/ACT inhaler Inhale 2 puffs into the lungs every 4 hours as needed for Wheezing 1 Inhaler 3    isosorbide dinitrate (ISORDIL) 10 MG tablet TAKE 1 TABLET BY MOUTH 3 TIMES A DAY 90 tablet 0    triamterene-hydroCHLOROthiazide (DYAZIDE) 37.5-25 MG per capsule TAKE 1 CAPSULE ONCE DAILY 90 capsule 0    glimepiride (AMARYL) 2 MG tablet TAKE 1 TABLET TWICE DAILY 180 tablet 0    rOPINIRole (REQUIP) 1 MG tablet TAKE ONE TABLET AT BEDTIME 30 tablet 2    lactobacillus (CULTURELLE) capsule Take 1 capsule by mouth 2 times daily 30 capsule 0    magnesium hydroxide (MILK OF MAGNESIA) 400 MG/5ML suspension Take 30 mLs by mouth daily as needed for Constipation 355 mL 0    ferrous sulfate 325 (65 Fe) MG tablet Take 325 mg by mouth every other day      Misc. Devices ([address] [address] [place]) MISC Use for ambulation 1 each 0    aspirin 81 MG tablet Take 1 tablet by mouth daily.  30 tablet        Past Medical History:   Diagnosis Date    Adenocarcinoma of prostate ([facility] 12/18/2019    BPH (benign prostatic hypertrophy)     CAD (coronary artery disease)     Chronic kidney disease (CKD), stage III (moderate) (HCC)     Cirrhosis of liver ([facility]     Diabetes mellitus type 2, controlled ([facility] 7/28/2015    Hyperlipidemia     Hyperlipidemia associated with type 2 diabetes mellitus ([facility] 11/30/2015    Hypertension     Neoplasm of uncertain behavior of skin 8/19/2011    Osteoarthrosis     Renal failure     Restless legs syndrome     SBO (small bowel obstruction) ([facility] 01/18/2020    SBO vs ileus    Type II or unspecified type diabetes mellitus without mention of complication, not stated as uncontrolled      Past Surgical History:   Procedure Laterality Date    COLONOSCOPY N/A 7/29/2020    COLONOSCOPY WITH BIOPSY performed by Nasra Busby MD at [address] [place] [address] TUR  08/14/2019    TURP N/A 8/14/2019    CYSTOSCOPY TRANSURETHRAL RESECTION PROSTATE, CYSTOLITHOPAXY performed by Juana Soto MD at [place] [address] N/A 7/29/2020    EGD BIOPSY performed by Nasra Busby MD at [facility]     Family History   Problem Relation Age of Onset    Cancer Mother         Bone CA    Stroke Father      Social History     Socioeconomic History    Marital status:      Spouse name: Not on file    Number of children: Not on file    Years of education: Not on file    Highest education level: Not on file   Occupational History    Not on file   Social Needs    Financial resource strain: Not on file    Food insecurity     Worry: Not on file     Inability: Not on file    Transportation needs     Medical: Not on file     Non-medical: Not on file   Tobacco Use    Smoking status: Never Smoker    Smokeless tobacco: Never Used   Substance and Sexual Activity    Alcohol use: No    Drug use: No    Sexual activity: Not Currently     Partners: Female   Lifestyle    Physical activity     Days per week: Not on file     Minutes per session: Not on file    Stress: Not on file   Relationships    Social connections     Talks on phone: Not on file     Gets together: Not on file     Attends [employer] service: Not on file     Active member of club or organization: Not on file     Attends meetings of clubs or organizations: Not on file     Relationship status: Not on file    Intimate partner violence     Fear of current or ex partner: Not on file     Emotionally abused: Not on file     Physically abused: Not on file     Forced sexual activity: Not on file   Other Topics Concern    Not on file   Social History Narrative    Not on file          Vitals:    08/06/20 1034   BP: 119/67   Site: Right Wrist   Position: Sitting   Cuff Size: Large Adult   Temp: 97 °F (36.1 °C)   TempSrc: Temporal   Weight: 154 lb (69.9 kg)   Height: 5' 5\" (1.651 m)     Body mass index is 25.63 kg/m².      Wt Readings from Last 3 Encounters:   08/06/20 154 lb (69.9 kg)   08/03/20 154 lb (69.9 kg)   07/31/20 166 lb (75.3 kg)     BP Readings from Last 3 Encounters:   08/06/20 119/67   08/03/20 110/78   07/31/20 120/77        ROS:  As per HPI, otherwise reviewed ×10 systems and negative    PE:  Constitutional:  Well developed, well nourished, no acute distress, non-toxic appearance   Eyes:  PERRL, conjunctiva normal   HENT:  Atraumatic, external ears normal, nose normal. Neck- normal range of motion, no tenderness, supple   Respiratory:  No respiratory distress, normal breath sounds, no rales, no wheezing   Cardiovascular:  Normal rate, normal rhythm  GI: Bowel sounds positive, softly distended, nontender  :  No costovertebral angle tenderness   Integument:  Well hydrated, no rash   Lymphatic:  No lymphadenopathy noted   Neurologic:  Alert & oriented x 3, no focal deficits noted   Psychiatric:  Speech and behavior appropriate       DATA:  Radiology Review: CT images reviewed and show sigmoid thickening      Assessment:  1. Malignant neoplasm of sigmoid colon ([facility]        Plan: Sigmoid cancer with associated stricture. Plan sigmoid colectomy. Given the patient's significant debility and advanced age, I did discuss with the family that he may require prolonged postoperative mechanical ventilation. In addition, my recommendation for procedure would be a sigmoid colectomy with colostomy, as this alleviates anastomotic leakage as one of the significant postoperative risks. I explained the procedure including risks, benefits, and alternatives. Questions were answered and the patient and family will discuss their options and let me know their decision in the next couple days.

## 2020-08-07 NOTE — TELEPHONE ENCOUNTER
Patient's daughter in law Babatunde Cabrera called and stated they talked it over with the patient last night and he would like to go ahead and schedule surgery.

## 2020-08-07 NOTE — TELEPHONE ENCOUNTER
I called and spoke to Christie Speaker, scheduled pt Sigmoid colectomy with colostomy SDA for 8/21/2020 6 AM arrival time for a 7:30 case, advised clear liquids the day before, NPO after midnight, needs to hold baby aspirin 5 days prior. May take daily meds with a small sip of water the morning of, I reviewed his chart, medications and allergies. Covid testing on Monday 8/17/2020 at [place]. [place], he will have a . Pt understands and agrees.

## 2020-08-18 NOTE — PRE-PROCEDURE INSTRUCTIONS

## 2020-08-19 NOTE — PROGRESS NOTES
Speech Language Pathology  Facility/Department: [facility]   CLINICAL BEDSIDE SWALLOW EVALUATION    NAME: Edilberto Hussein  : 1924  MRN: [id number]     Recommended Diet and Intervention  Diet Solids Recommendation: Dysphagia Soft and Bite-Sized (Dysphagia III)  Liquid Consistency Recommendation: Thin  Recommended Form of Meds: Whole with water(1-2 at a time)  ST to follow     ADMISSION DATE: 2020  ADMITTING DIAGNOSIS: has Hypertension; Benign prostatic hyperplasia; CAD (coronary artery disease); Type 2 diabetes mellitus without complication ([address] Osteoarthrosis; Restless legs syndrome; Renal failure; Neoplasm of uncertain behavior of skin; Hyperlipidemia associated with type 2 diabetes mellitus ([address] Chronic kidney disease (CKD), stage III (moderate) ([address] Primary osteoarthritis of both knees; Acute encephalopathy; Hypoglycemia; Elevated LFTs; BPH with urinary obstruction; Adenocarcinoma of prostate ([address] Acute cystitis without hematuria; Hypophosphatemia; Generalized weakness; Severe protein-calorie malnutrition ([address] Constipation; Community acquired pneumonia; New onset a-fib ([address] Elevated troponin; Iron deficiency anemia; History of atherosclerotic heart disease; SAN (dyspnea on exertion); Acute anemia; and Pleural effusion on their problem list.     ONSET DATE: 20    Recent Chest Xray/CT of Chest: (20 )  Impression    Small left and trace right pleural effusions with associated basilar    opacities favored to represent atelectasis although infection or aspiration    sequela could be present in the appropriate context.             Date of Eval: 2020  Evaluating Therapist: Grace Vargas    Current Diet level:  Current Diet : Regular  Current Liquid Diet : Thin      Primary Complaint  Patient Complaint: Pt denies dysphagia, odynophagia, or globus sensation.     Pain:  Pain Assessment  Pain Assessment: 0-10  Pain Level: 2  RASS Score: Alert and calm    Reason for Referral  India Pedraza was referred for a bedside swallow evaluation to assess the efficiency of his swallow function, identify signs and symptoms of aspiration and make recommendations regarding safe dietary consistencies, effective compensatory strategies, and safe eating environment. Impression  Dysphagia Diagnosis: Mild oral stage dysphagia;Mild pharyngeal stage dysphagia; Suspected needs further assessment    Dysphagia Impression : Bedside swallow evaluation completed. Pt alert and pleasant. Sitting upright on EOB upon entry. RN reports some coughing on apple juice this AM. Mild baseline wet vocal quality noted. Intermittent and mild wet vocal quality during PO. Pt missing most of his teeth. He states that he is able to chew anything/everything without his teeth. Thin liquids x 4 oz by cup, thin x 4 oz by straw, puree, dental soft, and regular texture observed. Pt able to feed self independently. Mild slowed mastication but effective. Independent oral clearance in b/w bites. Swallow appearing minimally delayed upon palpation, this did not appear to affect the safety of the swallow. Clear vocal quality throughout. 02 >93% throughout. RR 30 prior to PO and reduced to 26 during PO. Subtle delayed burp noted at times. Pt denies reflux. Cues needed for smaller bites/drinks and slowed rate. No overt clinical s/s penetration or aspiration. Unable to fully assess pharyngeal phase of the swallow and aspiration at bedside. If aspiration is suspected, recommend an MBSS for further assessment. Dysphagia Outcome Severity Scale: Level 5: Mild dysphagia- Distant supervision. May need one diet consistency restricted     Treatment Plan  Requires SLP Intervention: Yes  Duration/Frequency of Treatment: 2-3x/wk  D/C Recommendations: To be determined       Recommended Diet and Intervention  Diet Solids Recommendation: Dysphagia Soft and Bite-Sized (Dysphagia III)  Liquid Consistency Recommendation:  Thin  Recommended Form of Meds: Whole with water  Recommendations: Dysphagia treatment  Therapeutic Interventions: Diet tolerance monitoring; Therapeutic PO trials with SLP;Patient/Family education    Compensatory Swallowing Strategies  Compensatory Swallowing Strategies: Alternate solids and liquids;Eat/Feed slowly; No straws;Upright as possible for all oral intake;Remain upright for 30-45 minutes after meals;Small bites/sips    Treatment/Goals  Short-term Goals  Timeframe for Short-term Goals: 7 days (8/26/20)  Long-term Goals  Timeframe for Long-term Goals: 10 days (8/29/20)  Dysphagia Goals: The patient will tolerate recommended diet without observed clinical signs of aspiration; The patient will recall and perform compensatory strategies, with no cues. ;The patient will tolerate regular consistency solids 10/10. ;The patient will tolerate instrumental swallowing procedure if indicated. General  Chart Reviewed: Yes  Comments: Pt is a 79 y/o male admitted 8/18 with hypoglycemia and SOB. Currently a COVID r/o. Pt recently admitted on 7/24 with PNA. PMhx includes adenocarcinoma prostate (12/18/19), CKD 3, DM 2, HTN. Subjective  Subjective: Pt was alert, pleasant, and agreeable. Seen upright on EOB. Behavior/Cognition: Alert; Cooperative;Pleasant mood; Impulsive; Requires cueing  Respiratory Status: Room air  Communication Observation: Functional  Follows Directions: Simple  Dentition: Adequate; Some missing teeth  Baseline Vocal Quality: Normal  Prior Dysphagia History: Pt seen from 7/25/20-7/28/20 with recs for dysphagia III/thin. Consistencies Administered: Reg solid; Dysphagia Minced and Moist (Dysphagia II); Dysphagia Pureed (Dysphagia I); Thin - cup; Thin - straw    Vision/Hearing  Hearing  Hearing: Exceptions to [facility]  Hearing Exceptions: Hard of hearing/hearing concerns    Oral Motor Deficits  Oral/Motor  Oral Motor:  Within functional limits    Oral Phase Dysfunction  Oral Phase  Oral Phase: Exceptions  Oral Phase

## 2020-08-19 NOTE — ED PROVIDER NOTES
uncontrolled. Past surgical history:  has a past surgical history that includes TURP (08/14/2019); TURP (N/A, 8/14/2019); Upper gastrointestinal endoscopy (N/A, 7/29/2020); and Colonoscopy (N/A, 7/29/2020). Home medications:   Prior to Admission medications    Medication Sig Start Date End Date Taking? Authorizing Provider   isosorbide dinitrate (ISORDIL) 10 MG tablet TAKE 1 TABLET BY MOUTH 3 TIMES A DAY 8/18/20   Chandan House MD   rOPINIRole (REQUIP) 1 MG tablet TAKE ONE TABLET AT BEDTIME 8/18/20   Chandan House MD   traMADol (ULTRAM) 50 MG tablet Take 1 tablet by mouth every 12 hours for 30 days. 8/3/20 9/2/20  Geraldo Kurtz MD   ipratropium (ATROVENT HFA) 17 MCG/ACT inhaler Inhale 2 puffs into the lungs every 4 hours as needed for Wheezing 7/29/20   Chandan House MD   albuterol sulfate  (90 Base) MCG/ACT inhaler Inhale 2 puffs into the lungs every 4 hours as needed for Wheezing 7/29/20   Chandan House MD   triamterene-hydroCHLOROthiazide (DYAZIDE) 37.5-25 MG per capsule TAKE 1 CAPSULE ONCE DAILY 7/13/20   Chandan House MD   glimepiride (AMARYL) 2 MG tablet TAKE 1 TABLET TWICE DAILY 6/5/20   Chandan House MD   magnesium hydroxide (MILK OF MAGNESIA) 400 MG/5ML suspension Take 30 mLs by mouth daily as needed for Constipation 1/21/20   Swati Masters MD   ferrous sulfate 325 (65 Fe) MG tablet Take 325 mg by mouth every other day    Historical Provider, MD   Misc. Devices (MARIA ISABEL ROLLING WALKER [place]) MISC Use for ambulation 7/15/14   Marybel Martell MD   aspirin 81 MG tablet Take 1 tablet by mouth daily. 3/5/13   Thanh Morgan MD       Social history:  reports that he has never smoked. He has never used smokeless tobacco. He reports that he does not drink alcohol or use drugs.     Family history:    Family History   Problem Relation Age of Onset    Cancer Mother         Bone CA    Stroke Father          ROS  Review of Systems   Unable to perform ROS: Mental status change         Exam  ED Triage Vitals   BP Temp Temp src Pulse Resp SpO2 Height Weight   -- -- -- -- -- -- -- --       Physical Exam  Vitals signs and nursing note reviewed. Constitutional:       General: He is not in acute distress. Appearance: He is well-developed. Comments: Drowsy, arouses to loud verbal stimuli,   HENT:      Head: Normocephalic and atraumatic. Nose: Nose normal. No congestion. Mouth/Throat:      Mouth: Mucous membranes are moist.      Pharynx: Oropharynx is clear. Eyes:      Extraocular Movements: Extraocular movements intact. Pupils: Pupils are equal, round, and reactive to light. Neck:      Musculoskeletal: Normal range of motion and neck supple. No neck rigidity. Cardiovascular:      Rate and Rhythm: Tachycardia present. Rhythm irregular. Heart sounds: No murmur. Comments: Equal radial pulses  Pulmonary:      Effort: Pulmonary effort is normal.      Breath sounds: Normal breath sounds. Abdominal:      General: There is no distension. Palpations: Abdomen is soft. Tenderness: There is no abdominal tenderness. There is no guarding or rebound. Musculoskeletal: Normal range of motion. General: No deformity. Comments: No objective evidence of trauma   Skin:     General: Skin is warm. Findings: No rash. Neurological:      Mental Status: He is oriented to person, place, and time. Motor: No abnormal muscle tone. Coordination: Coordination normal.      Comments: Difficult exam grossly moving extremities equally.    Psychiatric:         Mood and Affect: Mood normal.         Behavior: Behavior normal.           ED Course    ED Medication Orders (From admission, onward)    Start Ordered     Status Ordering Provider    08/18/20 2315 08/18/20 0530  cefTRIAXone (ROCEPHIN) 1 g in sterile water 10 mL IV syringe  ONCE      Ordered Teetee FRANCE02 [facility] [address]    08/18/20 2315 08/18/20 7340 hypoglycemic with altered mental status. Fingerstick glucose on arrival 50s. Took prescribed glimepiride this morning. Patient was treated with D50 and 1 L normal saline and within minutes substantial improvement in his mental status. He is now alert and oriented x3. Moving all extremities equally. He has no focal deficits on repeat neuro exam.  Denies any focal complaints. His labs are significant for mildly elevated lactate of 2.1, anemia microcytic which appears to be stable, mildly elevated troponin 0 0.05 which is not new. No clear ischemic pattern on EKG. Has evidence of UTI. Suspect this is underlying cause. Will benefit from admission continued observation repeat fingerstick glucose checks. Clinical Impression:  1. Hypoglycemia    2. Acute cystitis without hematuria    3. Encephalopathy    4. Elevated troponin          Disposition:  Admit to telemetry in stable condition. Blood pressure 115/76, pulse 100, temperature 98.8 °F (37.1 °C), temperature source Oral, resp. rate 16, height 5' 6\" (1.676 m), weight 150 lb (68 kg), SpO2 98 %. Patient was given scripts for the following medications. I counseled patient how to take these medications. New Prescriptions    No medications on file       Disposition referral (if applicable):  No follow-up provider specified. Total critical care time is 30-35 minutes, which excludes separately billable procedures and updating family. Time spent is specifically for management of the presenting complaint and symptoms initially, direct bedside care, reevaluation, review of records, and consultation. There was a high probability of clinically significant life-threatening deterioration in the patient's condition, which required my urgent intervention.      This chart was generated in part by using Dragon Dictation system and may contain errors related to that system including errors in grammar, punctuation, and spelling, as well as words and phrases that may be inappropriate. If there are any questions or concerns please feel free to contact the dictating provider for clarification.      MD Luisana Sampson MD  08/18/20 6038

## 2020-08-19 NOTE — PROGRESS NOTES
Am assessment completed. See flowsheet. Pt a/any person place and time, pt unaware of why in hospital. Pt very [address]. Medicated with scheduled ultram at this time, pt c/o left leg \"feels like it's asleep\". Pt assisted up to dangle on side of bed per request. Bed alarm in place. Call light within reach. Sania Stephens RN\

## 2020-08-19 NOTE — ED NOTES
Report given to [facility]. Denies further questions.      Jillian Kee RN  08/18/20 7661

## 2020-08-19 NOTE — PLAN OF CARE
Problem: Nutrition  Intervention: Swallowing evaluation  Note: SLP evaluation completed. All further notes may be found in EMR. Ayesha Crawford MS-CCC-SLP [id number]    Intervention: Aspiration precautions  Note: SLP evaluation completed. All further notes may be found in EMR.     Ayesha Crawford MS-CCC-SLP [id number]

## 2020-08-19 NOTE — CARE COORDINATION
Case Management Assessment  Initial Evaluation      Patient Name: Shaheed Serna  YOB: 1924  Diagnosis: Hypoglycemia [E16.2]  Hypoglycemia [E16.2]  Date / Time: 8/18/2020  8:42 PM    Admission status/Date:  08/19/2020  Chart Reviewed: Yes      Patient Interviewed: Yes   Family Interviewed:  No      Hospitalization in the last 30 days:  Yes    Primary Contact Information: Patricia Mcmahon [phone]    Met with: Shaylee Chi (daughter in law)  Sebastian ledbetter  (bedside/phone): Phone    Current PCP: Michael Pascual MD    Financial  Medicare/Medicaid  Precert required for SNF : NO        3 night stay required - Waived D/T COVID    ADLS  Support Systems/Care Needs: Family Members, Friends/Neighbors  Transportation: family    Meal Preparation: family    Housing  Living Arrangements: Lives w/son and daughter in law in their home  Steps: ramped entry  Intent to return to present living arrangements: Yes  Identified Issues: NA    Home Care Information  Active with [address] : No Agency:(Services)  Type of Home Care Services: None  Passport/Waiver : No  :                      Phone Number:    Passport/Waiver Services: NA          Durable Medical Equipment   DME Provider: none  Equipment: Walker_X__Cane___RTS_X__ BSC___Shower Chair__X_Hospital Bed___W/C____Other________  02 at ____Liter(s)---wears(frequency)_______ HHN ___ CPAP___ BiPap___       Home O2 in place on admit:  No  - 98% RA    Community Service Affiliation  Dialysis:  No    · Agency:  · Location:  · Dialysis Schedule:  · Phone:   · Fax: Other Community Services: (ex:PT/OT,Mental Health,Wound Clinic, Cardio/Pul [address])    DISCHARGE PLAN: Explained Case Management role/services. Chart reviewed. Met with pt's daughter in law via phone due to C19 restrictions and explained the role of the CM. Plan is TBD. Pt is due for colon surgery/ colostomy scheduled on Friday. Care team made aware. +CM will follow.

## 2020-08-19 NOTE — PROGRESS NOTES
@ 01:05- [facility] [address] [place] [facility] at this time. @01:40- Patient arrived to 2W room 213-02 at this time, accompanied by Sarah Trammell transport team. Assisted him to transfer to bed, oriented to room and unit at this time. He is A/Ox3- not to time. Appears drowsy but is easily alert to voice. Breathing unlabored on room air, with diminished lung sounds to auscultation. VSS. He denies pain. See MAR for medication administrations. Safety measures in place and will continue to closely monitor.

## 2020-08-19 NOTE — ED NOTES
Pt now alert and oriented after amp D50, no apparent distress, able to answer all questions correctly. MD made aware. Daughter remains at bedside.       Abla Guzman RN  08/18/20 2381

## 2020-08-19 NOTE — H&P
Hospital Medicine History & Physical      PCP: [facility], MD    Date of Admission: 8/18/2020    Date of Service: Pt seen/examined on 8/19/2020    Chief Complaint:    Chief Complaint   Patient presents with    Hypoglycemia     pt BS in high 30's at home, EMS states pt given icecream before their arrival, BS 75 en route, BS 54 upon arrival to ED     History Of Present Illness: The patient is a 80 y.o. male with adenocarcinoma of the prostate, BPH, CAD, CKD, cirrhosis of liver, diabetes, hyperlipidemia, hypertension who presented to [facility] ED with complaint of hypoglycemia. Patient is from home. He was recently diagnosed with colon cancer, scheduled for surgery later this week. He had an outpatient COVID-19 testing done as preop on the 17th   ( pending). He came in yesterday for altered mental status and hypoglycemia. Blood sugars was in the 30s.  He was given ice cream with improvement in blood sugars into the mid 70s  -By arrival to the ER, blood sugar had dropped down into the 40s again--> given an amp of D50    Home medicine Amaryl is on hold,  he is getting IV fluids D5 half-normal saline, blood sugars have remained stable since he came in.   patient is awake, alert, responding well    Past Medical History:        Diagnosis Date    Adenocarcinoma of prostate ([address] 12/18/2019    BPH (benign prostatic hypertrophy)     CAD (coronary artery disease)     Chronic kidney disease (CKD), stage III (moderate) (HCC)     Cirrhosis of liver ([address]     Diabetes mellitus type 2, controlled ([address] 7/28/2015    Hyperlipidemia     Hyperlipidemia associated with type 2 diabetes mellitus ([address] 11/30/2015    Hypertension     Neoplasm of uncertain behavior of skin 8/19/2011    Osteoarthrosis     Renal failure     Restless legs syndrome     SBO (small bowel obstruction) ([address] 01/18/2020    SBO vs ileus    Type II or unspecified type diabetes mellitus without mention of complication, not stated as uncontrolled        Past Surgical History:        Procedure Laterality Date    COLONOSCOPY N/A 7/29/2020    COLONOSCOPY WITH BIOPSY performed by Boris Collazo MD at [facility] TURP  08/14/2019    TURP N/A 8/14/2019    CYSTOSCOPY TRANSURETHRAL RESECTION PROSTATE, CYSTOLITHOPAXY performed by Diana Coffman MD at [facility] N/A 7/29/2020    EGD BIOPSY performed by Boris Collazo MD at [facility]       Medications Prior to Admission:    Prior to Admission medications    Medication Sig Start Date End Date Taking? Authorizing Provider   isosorbide dinitrate (ISORDIL) 10 MG tablet TAKE 1 TABLET BY MOUTH 3 TIMES A DAY 8/18/20  Yes Robi Heredia MD   rOPINIRole (REQUIP) 1 MG tablet TAKE ONE TABLET AT BEDTIME 8/18/20  Yes Robi Heredia MD   traMADol (ULTRAM) 50 MG tablet Take 1 tablet by mouth every 12 hours for 30 days. 8/3/20 9/2/20 Yes Erika Hampton MD   ipratropium (ATROVENT HFA) 17 MCG/ACT inhaler Inhale 2 puffs into the lungs every 4 hours as needed for Wheezing 7/29/20  Yes Robi Heredia MD   albuterol sulfate  (90 Base) MCG/ACT inhaler Inhale 2 puffs into the lungs every 4 hours as needed for Wheezing 7/29/20  Yes Robi Heredia MD   triamterene-hydroCHLOROthiazide (DYAZIDE) 37.5-25 MG per capsule TAKE 1 CAPSULE ONCE DAILY 7/13/20  Yes Robi Heredia MD   magnesium hydroxide (MILK OF MAGNESIA) 400 MG/5ML suspension Take 30 mLs by mouth daily as needed for Constipation 1/21/20  Yes Ca Dobson MD   ferrous sulfate 325 (65 Fe) MG tablet Take 325 mg by mouth every other day   Yes Historical Provider, MD [place]. Devices (MARIA ISABEL [employer]ING [employer] [place]) MISC Use for ambulation 7/15/14  Yes Snehal Hager MD   glimepiride (AMARYL) 2 MG tablet TAKE 1 TABLET TWICE DAILY 6/5/20   Robi Heredia MD   aspirin 81 MG tablet Take 1 tablet by mouth daily.  3/5/13 Deyanira Mcdermott MD       Allergies:  Patient has no known allergies. Social History:  The patient currently lives at home    TOBACCO:   reports that he has never smoked. He has never used smokeless tobacco.  ETOH:   reports no history of alcohol use. Family History:   Positive as follows:        Problem Relation Age of Onset    Cancer Mother         Bone CA    Stroke Father        REVIEW OF SYSTEMS:       Constitutional: Negative for fever   HENT: Negative for sore throat   Eyes: Negative for redness   Respiratory: Negative  for dyspnea, cough   Cardiovascular: Negative for chest pain   Gastrointestinal: Negative for vomiting, diarrhea   Genitourinary: Negative for hematuria   Musculoskeletal: Negative for arthralgias   Skin: Negative for rash   Neurological: Negative for syncope   Hematological: Negative for adenopathy   Psychiatric/Behavorial: Negative for anxiety        PHYSICAL EXAM:    /69   Pulse 91   Temp 98.2 °F (36.8 °C) (Oral)   Resp 19   Ht 5' 6\" (1.676 m)   Wt 149 lb (67.6 kg)   SpO2 98%   BMI 24.05 kg/m²   Gen: No distress. Alert. Eyes: PERRL. No sclera icterus. No conjunctival injection. ENT: No discharge. Pharynx clear. Neck: No JVD. No Carotid Bruit. Trachea midline. Resp: No accessory muscle use. No crackles. No wheezes. No rhonchi. CV: Regular rate. Regular rhythm. No murmur. No rub. No edema. Capillary Refill: Brisk,< 3 seconds   Peripheral Pulses: +2 palpable, equal bilaterally   GI: Non-tender. Non-distended. No masses. No organomegaly. Normal bowel sounds. No hernia. Skin: Warm and dry. No nodule on exposed extremities. No rash on exposed extremities. M/S: No cyanosis. No joint deformity. No clubbing. Neuro: Awake. Grossly nonfocal    Psych: Oriented x 3. No anxiety or agitation.      CBC:   Recent Labs     08/18/20  2200   WBC 9.1   HGB 8.2*   HCT 30.0*   MCV 68.4*        BMP:   Recent Labs     08/18/20 2055      K 4.5      CO2 26   BUN 28*   CREATININE 1.1     LIVER PROFILE:   Recent Labs     08/18/20 2055   AST 46*   ALT 13   BILITOT 0.7   ALKPHOS 97     PT/INR:   Recent Labs     08/18/20 2310   PROTIME 13.7*   INR 1.19*     UA:  Recent Labs     08/18/20 2210   COLORU Yellow   PHUR 6.5   WBCUA 6-9*   RBCUA 0-2   BACTERIA 3+*   CLARITYU Clear   SPECGRAV 1.020   LEUKOCYTESUR MODERATE*   UROBILINOGEN 1.0   BILIRUBINUR Negative   BLOODU Negative   GLUCOSEU Negative     CARDIAC ENZYMES  Recent Labs     08/18/20 2055 08/18/20 2310   TROPONINI 0.05* 0.05*     CULTURES  Blood Cx: pending   Urine Cx: pending   COVID-19: pending     EKG:  I have reviewed the EKG with the following interpretation:   Atrial fibrillation with rapid ventricular response with premature ventricular or aberrantly conducted complexes (rate 104)  Nonspecific ST and T wave abnormality , probably digitalis effect      RADIOLOGY  XR CHEST PORTABLE   Final Result   Small left and trace right pleural effusions with associated basilar   opacities favored to represent atelectasis although infection or aspiration   sequela could be present in the appropriate context. Pertinent previous results reviewed   Surgical Pathology 7/29/20  A. Duodenum, biopsies:   - Benign small bowel mucosa with normal villous architecture. - Negative for duodenitis, changes of celiac disease, dysplasia, or   malignancy. B. Sigmoid colon stricture, biopsies:   - Invasive adenocarcinoma, moderately differentiated. ASSESSMENT/PLAN:    Hypoglycemia  DM type 2  -Reported blood sugars in the high 30s at home, was given ice cream with improvement in blood sugars into the mid 70s  -By arrival to the ER, blood sugar had dropped down into the 40s again--> given an amp of D50  -Patients home meds lists--> Amaryl- > Placed on HOLD  - Started on D5 1/2 NS, BG improved   We will stop IV fluids. patient tolerating diet. monitor blood sugars. Amaryl has been discontinued.       Acute metabolic encephalopathy  - due to hypoglycemia  - resolved     Possible UTI  - UA with + LE, only 6-9 WBC, but 4+ bacteria   - Urine Cx is pending   - Continue Rocephin D#2     Elevated troponin  -Initial troponin elevated at 0.05-->0.05  -EKG with atrial fibrillation (rate 104) without acute ischemic changes   -Continue telemetry  -Appears stable, low concerns for acute ACS    Colon cancer  -Recent diagnosis-->colonic stricture noted on colonoscopy during last admission   - Surgical pathology shows invasive adenocarcinoma  - Reported patient is scheduled for colon resection on Friday 8/21  COVID-19 was sent out as an outpatient on the 17th for preop. Currently results pending and hence patient is on droplet plus precautions. Bilateral trace pleural effusions/basilar opacities  -Atelectasis versus aspiration  - SLP evaluation--> on dysphagia diet     Microcytic, hypochromic anemia  -Baseline hemoglobin ~7.5  -Hemoglobin on admission 8.2  - Chronic, stable   -Continue to monitor  -Iron labs low on last admission, was started on iron supplementation. Held for surgery    Hx of adenocarcinoma of the prostate  BPH  - not on any home medications for this     CAD  Atrial fibrillation   - Rate controlled   - only on ASA daily -> held for surgery  - Continue home medications , nitrates     HTN  - BP is well controlled   - Continue home medications -> trim/ HCTZ    HLD  - Not on a statin? DVT Prophylaxis: Lovenox   Diet: DIET GENERAL;   Code Status: Full Code    -> stop IVF and Monitor blood sugars today.   Plan : DC in AM    patient  can go for his scheduled surgery on Friday         Murray Griffin MD   8/19/2020

## 2020-08-19 NOTE — PROGRESS NOTES
No admission orders yet received for patient. This RN notiied nocturnist, Dr. Janki Vallejo via [facility] [facility] message at this time.

## 2020-08-19 NOTE — PLAN OF CARE
Problem: Falls - Risk of:  Goal: Will remain free from falls  Description: Will remain free from falls  8/19/2020 1152 by Ira Ballesteros RN  Outcome: Ongoing  8/19/2020 0211 by Vale Smoker  Outcome: Ongoing  Goal: Absence of physical injury  Description: Absence of physical injury  8/19/2020 1152 by Ira Ballesteros RN  Outcome: Ongoing  8/19/2020 0211 by Vale Smoker  Outcome: Ongoing     Problem: Skin Integrity:  Goal: Will show no infection signs and symptoms  Description: Will show no infection signs and symptoms  8/19/2020 1152 by Ira Ballesteros RN  Outcome: Ongoing  8/19/2020 0211 by Vale Smoker  Outcome: Ongoing  Goal: Absence of new skin breakdown  Description: Absence of new skin breakdown  8/19/2020 1152 by Ira Ballesteros RN  Outcome: Ongoing  8/19/2020 0211 by Vale Smoker  Outcome: Ongoing     Problem: Infection:  Goal: Will remain free from infection  Description: Will remain free from infection  8/19/2020 1152 by Ira Ballesteros RN  Outcome: Ongoing  8/19/2020 0211 by Vale Smoker  Outcome: Ongoing     Problem: Safety:  Goal: Free from accidental physical injury  Description: Free from accidental physical injury  8/19/2020 1152 by Ira Ballesteros RN  Outcome: Ongoing  8/19/2020 0211 by Vale Smoker  Outcome: Ongoing  Goal: Free from intentional harm  Description: Free from intentional harm  8/19/2020 1152 by Ira Ballesteros RN  Outcome: Ongoing  8/19/2020 0211 by Vale Smoker  Outcome: Ongoing     Problem: Daily Care:  Goal: Daily care needs are met  Description: Daily care needs are met  8/19/2020 1152 by Ira Ballesteros RN  Outcome: Ongoing  8/19/2020 0211 by Vale Smoker  Outcome: Ongoing     Problem: Pain:  Goal: Patient's pain/discomfort is manageable  Description: Patient's pain/discomfort is manageable  8/19/2020 1152 by Ira Ballesteros RN  Outcome: Ongoing  8/19/2020 0211 by Vale Smoker  Outcome: Ongoing     Problem: Skin Integrity:  Goal: Skin integrity will stabilize  Description: Skin integrity will stabilize  8/19/2020 1152 by Ira Ballesteros RN  Outcome: Ongoing  8/19/2020 0211 by Vale Smoker  Outcome: Ongoing     Problem: Discharge Planning:  Goal: Patients continuum of care needs are met  Description: Patients continuum of care needs are met  8/19/2020 1152 by Ira Ballesteros RN  Outcome: Ongoing  8/19/2020 0211 by Vale Smoker  Outcome: Ongoing

## 2020-08-19 NOTE — ED NOTES
Pt sitting up in bed. States he \"feels much better\". Boxed lunch, apple juice and pepsi given. Pt eating at this time. Daughter remains at bedside. Continuing to monitor.      Kathryn Flores RN  08/18/20 9133

## 2020-08-20 PROBLEM — G93.40 ENCEPHALOPATHY: Status: RESOLVED | Noted: 2019-06-14 | Resolved: 2020-01-01

## 2020-08-20 NOTE — PLAN OF CARE
Problem: Falls - Risk of:  Goal: Will remain free from falls  Description: Will remain free from falls  8/20/2020 1302 by Eddi Giron RN  Outcome: Ongoing  8/20/2020 0139 by Antoinette Steinberg RN  Outcome: Ongoing  Goal: Absence of physical injury  Description: Absence of physical injury  8/20/2020 1302 by Eddi Giron RN  Outcome: Ongoing  8/20/2020 0139 by Antoinette Steinberg RN  Outcome: Ongoing     Problem: Skin Integrity:  Goal: Will show no infection signs and symptoms  Description: Will show no infection signs and symptoms  8/20/2020 1302 by Eddi Giron RN  Outcome: Ongoing  8/20/2020 0139 by Antoinette Steinberg RN  Outcome: Ongoing  Goal: Absence of new skin breakdown  Description: Absence of new skin breakdown  8/20/2020 1302 by Eddi Giron RN  Outcome: Ongoing  8/20/2020 0139 by Antoinette Steinberg RN  Outcome: Ongoing     Problem: Infection:  Goal: Will remain free from infection  Description: Will remain free from infection  8/20/2020 1302 by Eddi Giron RN  Outcome: Ongoing  8/20/2020 0139 by Antoinette Steinberg RN  Outcome: Ongoing     Problem: Safety:  Goal: Free from accidental physical injury  Description: Free from accidental physical injury  8/20/2020 1302 by Eddi Giron RN  Outcome: Ongoing  8/20/2020 0139 by Antoinette Steinberg RN  Outcome: Ongoing  Goal: Free from intentional harm  Description: Free from intentional harm  8/20/2020 1302 by Eddi Giron RN  Outcome: Ongoing  8/20/2020 0139 by Antoinette Steinberg RN  Outcome: Ongoing     Problem: Daily Care:  Goal: Daily care needs are met  Description: Daily care needs are met  8/20/2020 1302 by Eddi Giron RN  Outcome: Ongoing  8/20/2020 0139 by Antoinette Steinberg RN  Outcome: Ongoing     Problem: Pain:  Goal: Patient's pain/discomfort is manageable  Description: Patient's pain/discomfort is manageable  8/20/2020 1302 by Eddi Giron RN  Outcome: Ongoing  8/20/2020 0139 by Antoinette Steinberg RN  Outcome:

## 2020-08-20 NOTE — PROGRESS NOTES
COVID test done on 8/17 = invalid. Dr Charisse Mcnulty aware with new orders to rapid swab this patient for COVID.

## 2020-08-20 NOTE — DISCHARGE SUMMARY
Name:  Suzy Ruiz  Room:  0213/0213-02  MRN:    [id number]    Discharge Summary      This discharge summary is in conjunction with a complete physical exam done on the day of discharge. Discharging Physician: Dr. Bela Henry: 8/18/2020  Discharge: 8/20/2020     HPI taken from admission H&P:    The patient is a 80 y.o. male with adenocarcinoma of the prostate, BPH, CAD, CKD, cirrhosis of liver, diabetes, hyperlipidemia, hypertension who presented to [facility] ED with complaint of hypoglycemia.     Patient is from home. He was recently diagnosed with colon cancer, scheduled for surgery later this week. He had an outpatient COVID-19 testing done as preop on the 17th   ( pending). He came in yesterday for altered mental status and hypoglycemia. Blood sugars was in the 30s.  He was given ice cream with improvement in blood sugars into the mid 70s  -By arrival to the ER, blood sugar had dropped down into the 40s again--> given an amp of D50    Home medicine Amaryl is on hold,  he is getting IV fluids D5 half-normal saline, blood sugars have remained stable since he came in.   patient is awake, alert, responding well    Diagnoses this Admission and Hospital Course   Hypoglycemia  DM type 2  -Reported blood sugars in the high 30s at home, was given ice cream with improvement in blood sugars into the mid 70s  -By arrival to the ER, blood sugar had dropped down into the 40s again--> given an amp of D50  -Patients home meds lists--> Amaryl- > Placed on HOLD  - Started on D5 1/2 NS, BG improved   --> stopped IV fluids.  Patient tolerating diet.  Monitored blood sugars. stable today    - Amaryl has been discontinued.       Acute metabolic encephalopathy  - due to hypoglycemia  - resolved      Possible UTI  - UA with + LE, only 6-9 WBC, but 4+ bacteria   - Urine Cx is negative   - treated with Rocephin D#3  - No further needs for Abx      Elevated troponin  -Initial troponin elevated at 0.05-->0.05  [facility] atrial fibrillation (rate 104) without acute ischemic changes   -Continue telemetry  -Appears stable, low concerns for acute ACS     Colon cancer  -Recent diagnosis-->colonic stricture noted on colonoscopy during last admission   - Surgical pathology shows invasive adenocarcinoma  - Reported patient is scheduled for colon resection on Friday 8/21  - COVID-19 was sent out as an outpatient on the 17th for preop. results were invalid, retested with negative results       Bilateral trace pleural effusions/basilar opacities  - Atelectasis versus aspiration  - SLP evaluation--> on dysphagia diet      Microcytic, hypochromic anemia  -Baseline hemoglobin ~7.5  -Hemoglobin on admission 8.2  - Chronic, stable   -Continue to monitor  -Iron labs low on last admission, was started on iron supplementation. Held for surgery     Hx of adenocarcinoma of the prostate  BPH  - not on any home medications for this      CAD  Atrial fibrillation   - Rate controlled   - only on ASA daily -> held for surgery  - Continue home medications , nitrates      HTN  - BP is well controlled   - Continue home medications -> trim/ HCTZ, mild hypotension today, given upcoming surgery, would stop HTN medications for now and can follow up OP for repeat BP check prior to resuming     HLD  - Not on a statin? Procedures (Please Review Full Report for Details)  None     Consults    None     Physical Exam at Discharge:    BP (!) 99/52   Pulse 63   Temp 98.2 °F (36.8 °C) (Oral)   Resp 16   Ht 5' 6\" (1.676 m)   Wt 149 lb (67.6 kg)   SpO2 97%   BMI 24.05 kg/m²   Gen: No distress. Alert. Eyes: PERRL. No sclera icterus. No conjunctival injection. ENT: No discharge. Pharynx clear. Neck: No JVD.  No Carotid Bruit. Trachea midline. Resp: No accessory muscle use. No crackles. No wheezes. No rhonchi. CV: Regular rate. Regular rhythm. No murmur.  No rub. No edema.    Capillary Refill: Brisk,< 3 seconds   Peripheral Pulses: +2 palpable, equal bilaterally GI: Non-tender. Non-distended. No masses. No organomegaly. Normal bowel sounds. No hernia. Skin: Warm and dry. No nodule on exposed extremities. No rash on exposed extremities. M/S: No cyanosis. No joint deformity. No clubbing. Neuro: Awake. Grossly nonfocal    Psych: Oriented x 3. No anxiety or agitation. CBC:   Recent Labs     08/18/20  2200   WBC 9.1   HGB 8.2*   HCT 30.0*   MCV 68.4*        BMP:   Recent Labs     08/18/20 2055      K 4.5      CO2 26   BUN 28*   CREATININE 1.1     LIVER PROFILE:   Recent Labs     08/18/20 2055   AST 46*   ALT 13   BILITOT 0.7   ALKPHOS 97     PT/INR:   Recent Labs     08/18/20 2310   PROTIME 13.7*   INR 1.19*     UA:  Recent Labs     08/18/20  2210   COLORU Yellow   PHUR 6.5   WBCUA 6-9*   RBCUA 0-2   BACTERIA 3+*   CLARITYU Clear   SPECGRAV 1.020   LEUKOCYTESUR MODERATE*   UROBILINOGEN 1.0   BILIRUBINUR Negative   BLOODU Negative   GLUCOSEU Negative       CULTURES  Urine Cx: negative   Blood Cx: pending     RADIOLOGY  XR CHEST PORTABLE   Final Result   Small left and trace right pleural effusions with associated basilar   opacities favored to represent atelectasis although infection or aspiration   sequela could be present in the appropriate context.              Discharge Medications     Medication List      CONTINUE taking these medications    albuterol sulfate  (90 Base) MCG/ACT inhaler  Inhale 2 puffs into the lungs every 4 hours as needed for Wheezing     Kwaku Rolling Walker Elite Misc  Use for ambulation     ipratropium 17 MCG/ACT inhaler  Commonly known as:  ATROVENT HFA  Inhale 2 puffs into the lungs every 4 hours as needed for Wheezing     isosorbide dinitrate 10 MG tablet  Commonly known as:  ISORDIL  TAKE 1 TABLET BY MOUTH 3 TIMES A DAY     magnesium hydroxide 400 MG/5ML suspension  Commonly known as:  MILK OF MAGNESIA  Take 30 mLs by mouth daily as needed for Constipation     rOPINIRole 1 MG tablet  Commonly known as: REQUIP  TAKE ONE TABLET AT BEDTIME     traMADol 50 MG tablet  Commonly known as:  ULTRAM  Take 1 tablet by mouth every 12 hours for 30 days. STOP taking these medications    aspirin 81 MG tablet     ferrous sulfate 325 (65 Fe) MG tablet  Commonly known as:  IRON 325     glimepiride 2 MG tablet  Commonly known as:  AMARYL     triamterene-hydroCHLOROthiazide 37.5-25 MG per capsule  Commonly known as:  DYAZIDE          Discharged in stable condition to home    Follow Up:   Follow up with PCP, keep OP follow up with surgery as scheduled           Nyla Mead MD  8/20/2020

## 2020-08-20 NOTE — CARE COORDINATION
DISCHARGE ORDER  Date/Time 2020 3:03 PM  Completed by: Woody Rasmussen, Case Management    Patient Name: Mandy Latif      : 1924  Admitting Diagnosis: Hypoglycemia [E16.2]  Hypoglycemia [E16.2]      Admit order Date and Status: 20 inpatient  (verify MD's last order for status of admission)      Noted discharge order. Confirmed discharge plan  : Yes  with whom sarahi  If pt confirmed DC plan does family need to be contacted by CM Yes if yes who [facility] sarahi  Discharge Plan:  Order for dc noted. Spoke with pt who cont plan for home. Spoke with Arvaleriano Mikedaren who confirms plan is for home and she will  at 18:45. Discussed with [facility] that pt will need to come back in AM for his scheduled surgery. Explained cannot keep thru the nite and then do surgery in AM D/T insurance and diagnosis. Chrissy verbalized understanding of this. Noted pt to be admitted on  after surgery. Will not set up [facility] today D/T return in AM. Chart reviewed and no other dc needs identified. Reviewed chart. Role of discharge planner explained and patient verbalized understanding. Discharge order is noted. Has Home O2 in place on admit:  No  Informed of need to bring portable home O2 tank on day of discharge for nursing to connect prior to leaving:   Not Indicated  Verbalized agreement/Understanding:   Not Indicated  Pt is being d/c'd to home today. Pt's O2 sats are 97% on RA. Discharge timeout done with  Nsg, pt, cm and sarahi. All discharge needs and concerns addressed.

## 2020-08-20 NOTE — PROGRESS NOTES
AM assessment complete. AM meds given. See MAR. Pt. In chair, quiet, eating. Television on; headphones in use. A/O x4, hard of hearing, needs constant repeat, but follows commands and understands safety precautions. Call light within reach. Denies further needs at this time.

## 2020-08-20 NOTE — PROGRESS NOTES
Pt. Taken by wheelchair down for discharge. Discharge instructions given to daughter. Verbalizes understanding; repeat back instructions correctly. IV removed without difficulty and dry dressing in place. PT. Discharged with all personal belongings.

## 2020-08-20 NOTE — PROGRESS NOTES
PM assessment completed, see flow sheet. Pt is alert and oriented except to why he is here. Respirations are even & easy at rest. Pt denies needs at this time. SR up x 2, and bed in low position. Call light is within reach.

## 2020-08-20 NOTE — PLAN OF CARE
Problem: Falls - Risk of:  Goal: Will remain free from falls  Description: Will remain free from falls  8/20/2020 0139 by Marine Verma RN  Outcome: Ongoing  8/19/2020 1152 by Sarahy Mansfield RN  Outcome: Ongoing  Goal: Absence of physical injury  Description: Absence of physical injury  8/20/2020 0139 by Marine Verma RN  Outcome: Ongoing  8/19/2020 1152 by Sarahy Mansfield RN  Outcome: Ongoing     Problem: Skin Integrity:  Goal: Will show no infection signs and symptoms  Description: Will show no infection signs and symptoms  8/20/2020 0139 by Marine Verma RN  Outcome: Ongoing  8/19/2020 1152 by Sarahy Mansfield RN  Outcome: Ongoing  Goal: Absence of new skin breakdown  Description: Absence of new skin breakdown  8/20/2020 0139 by Marine Verma RN  Outcome: Ongoing  8/19/2020 1152 by Sarahy Mansfield RN  Outcome: Ongoing     Problem: Infection:  Goal: Will remain free from infection  Description: Will remain free from infection  8/20/2020 0139 by Marine Verma RN  Outcome: Ongoing  8/19/2020 1152 by Sarahy Mansfield RN  Outcome: Ongoing     Problem: Safety:  Goal: Free from accidental physical injury  Description: Free from accidental physical injury  8/20/2020 0139 by Marine Verma RN  Outcome: Ongoing  8/19/2020 1152 by Sarahy Mansfield RN  Outcome: Ongoing  Goal: Free from intentional harm  Description: Free from intentional harm  8/20/2020 0139 by Marine Verma RN  Outcome: Ongoing  8/19/2020 1152 by Sarahy Mansfield RN  Outcome: Ongoing     Problem: Daily Care:  Goal: Daily care needs are met  Description: Daily care needs are met  8/20/2020 0139 by Marine Verma RN  Outcome: Ongoing  8/19/2020 1152 by Sarahy Mansfield RN  Outcome: Ongoing     Problem: Pain:  Goal: Patient's pain/discomfort is manageable  Description: Patient's pain/discomfort is manageable  8/20/2020 0139 by Marine Verma RN  Outcome: Ongoing  8/19/2020 1152 by Sarahy Mansfield RN  Outcome: Ongoing     Problem: Skin Integrity:  Goal: Skin integrity will stabilize  Description: Skin integrity will stabilize  8/20/2020 0139 by Shilpa Ngo RN  Outcome: Ongoing  8/19/2020 1152 by Lis Kingsley RN  Outcome: Ongoing     Problem: Discharge Planning:  Goal: Patients continuum of care needs are met  Description: Patients continuum of care needs are met  8/20/2020 0139 by Shilpa Ngo RN  Outcome: Ongoing  8/19/2020 1152 by Lis Kingsley RN  Outcome: Ongoing     Problem: Nutrition  Goal: Optimal nutrition therapy  Outcome: Ongoing  Goal: Understanding of nutritional guidelines  Outcome: Ongoing

## 2020-08-20 NOTE — PROGRESS NOTES
Physician Progress Note      PATIENT:               Burgess Eagle  CSN #:                  [id number]  :                       1924  ADMIT DATE:       2020 8:42 PM  [address] DATE:  RESPONDING  PROVIDER #:        Cheryl Mejia MD          QUERY TEXT:    Patient admitted with DM 2 with hypoglycemia, noted to have atrial   fibrillation. If possible, please document in progress notes and discharge   summary further specificity regarding the type of atrial fibrillation: The medical record reflects the following:  Risk Factors: hx atrial fibrillation  Clinical Indicators: EKG -Atrial fibrillation with rapid ventricular response. H&P rate controlled  Treatment:  ASA daily being held for surgery,  monitor labs, monitor  vitals    Chronic: nonspecific term that could be referring to paroxysmal, persistent,   or permanent  Longstanding persistent: persistent and continuous, lasting > 1 year. Paroxysmal - self-terminating or intermittent; resolves with or without   intervention within 7 days of onset; may recur with various frequency. Persistent - Fails to terminate within 7 days; Often requires meds or   cardioversion to restore to NSR. Permanent - longstanding & persistent; Medication has been ineffective in   restoring NSR &/or cardioversion is contraindicated    Thank You Melissa Polo RN, [phone]  Options provided:  -- Paroxysmal Atrial Fibrillation  -- Longstanding Persistent Atrial Fibrillation  -- Permanent Atrial Fibrillation  -- Persistent Atrial Fibrillation  -- Chronic Atrial Fibrillation, unspecified  -- Other - I will add my own diagnosis  -- Disagree - Not applicable / Not valid  -- Disagree - Clinically unable to determine / Unknown  -- Refer to Clinical Documentation Reviewer    PROVIDER RESPONSE TEXT:    This patient has permanent atrial fibrillation.     Query created by: Marcos Ha on 2020 2:23 PM      Electronically signed by:  Cheryl Mejia MD 2020 7:00 PM

## 2020-08-20 NOTE — PROGRESS NOTES
Pt in chair resting comfortably and appears in no distress. Pt denies any needs at this time. Will continue to monitor. Call light within reach.

## 2020-08-21 PROBLEM — C18.9 COLON CANCER (HCC): Status: ACTIVE | Noted: 2020-01-01

## 2020-08-21 NOTE — ANESTHESIA PRE PROCEDURE
Department of Anesthesiology  Preprocedure Note       Name:  Oumar Webster   Age:  80 y.o.  :  1924                                          MRN:  [id number]         Date:  2020      Surgeon: Toni Hicks):  Patricio Chavez MD    Procedure: Procedure(s):  SIGMOID COLECTOMY, COLOSTOMY    Medications prior to admission:   Prior to Admission medications    Medication Sig Start Date End Date Taking? Authorizing Provider   isosorbide dinitrate (ISORDIL) 10 MG tablet TAKE 1 TABLET BY MOUTH 3 TIMES A DAY 20   Darryle Muskrat, MD   rOPINIRole (REQUIP) 1 MG tablet TAKE ONE TABLET AT BEDTIME 20   Darryle Muskrat, MD   traMADol (ULTRAM) 50 MG tablet Take 1 tablet by mouth every 12 hours for 30 days. 8/3/20 9/2/20  Jimbo Reyes MD   ipratropium (ATROVENT HFA) 17 MCG/ACT inhaler Inhale 2 puffs into the lungs every 4 hours as needed for Wheezing 20   Darryle Muskrat, MD   albuterol sulfate  (90 Base) MCG/ACT inhaler Inhale 2 puffs into the lungs every 4 hours as needed for Wheezing 20   Darryle Muskrat, MD   magnesium hydroxide (MILK OF MAGNESIA) 400 MG/5ML suspension Take 30 mLs by mouth daily as needed for Constipation 20   Giancarlo Carr MD   [facility].  Devices (MARIA ISABEL ROLLING WALKER ELITE) MISC Use for ambulation 7/15/14   Marian Tang MD       Current medications:    Current Facility-Administered Medications   Medication Dose Route Frequency Provider Last Rate Last Dose    lactated ringers infusion   Intravenous Continuous Georges Cox MD        sodium chloride flush 0.9 % injection 10 mL  10 mL Intravenous 2 times per day Georges Cox MD        sodium chloride flush 0.9 % injection 10 mL  10 mL Intravenous PRN Georges Cox MD        famotidine (PEPCID) injection 20 mg  20 mg Intravenous Once Georges Cox MD           Allergies:  No Known Allergies    Problem List:    Patient Active Problem List   Diagnosis Code    Hypertension I10    Benign prostatic hyperplasia N40.0    CAD (coronary artery disease) I25.10    Type 2 diabetes mellitus without complication (HCC) X42.5    Osteoarthrosis M19.90    Restless legs syndrome G25.81    Renal failure N19    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.5    Hyperlipidemia associated with type 2 diabetes mellitus (HCC) E11.69, E78.5    Chronic kidney disease (CKD), stage III (moderate) (HCC) N18.3    Primary osteoarthritis of both knees M17.0    Elevated LFTs R94.5    BPH with urinary obstruction N40.1, N13.8    Adenocarcinoma of prostate ([facility] C61    Acute cystitis without hematuria N30.00    Hypophosphatemia E83.39    Generalized weakness R53.1    Severe protein-calorie malnutrition (HCC) E43    Constipation K59.00    Community acquired pneumonia J18.9    New onset a-fib (HCC) I48.91    Elevated troponin R79.89    Iron deficiency anemia D50.9    History of atherosclerotic heart disease Z86.79    SAN (dyspnea on exertion) R06.09    Acute anemia D64.9    Pleural effusion J90    Malignant neoplasm of colon (HCC) C18.9       Past Medical History:        Diagnosis Date    Adenocarcinoma of prostate ([facility] [facility] 12/18/2019    BPH (benign prostatic hypertrophy)     CAD (coronary artery disease)     Chronic kidney disease (CKD), stage III (moderate) (HCC)     Cirrhosis of liver ([facility]     Diabetes mellitus type 2, controlled ([facility] 7/28/2015    Hyperlipidemia     Hyperlipidemia associated with type 2 diabetes mellitus ([address] 11/30/2015    Hypertension     Neoplasm of uncertain behavior of skin 8/19/2011    Osteoarthrosis     Renal failure     Restless legs syndrome     SBO (small bowel obstruction) ([facility] [address] [facility] 01/18/2020    SBO vs ileus    Type II or unspecified type diabetes mellitus without mention of complication, not stated as uncontrolled        Past Surgical History:        Procedure Laterality Date    COLONOSCOPY N/A 7/29/2020 13.7 08/18/2020    INR 1.19 08/18/2020    APTT 29.3 06/14/2019       HCG (If Applicable): No results found for: PREGTESTUR, PREGSERUM, HCG, HCGQUANT     ABGs: No results found for: PHART, PO2ART, SKW7PPL, MEN1TWP, BEART, N6DHNHJE     Type & Screen (If Applicable):  No results found for: LABABO, LABRH    Drug/Infectious Status (If Applicable):  No results found for: HIV, HEPCAB    COVID-19 Screening (If Applicable):   Lab Results   Component Value Date    COVID19 Not Detected 08/20/2020    COVID19 INVALID 08/17/2020    COVID19 Not Detected 07/24/2020         Anesthesia Evaluation  Patient summary reviewed no history of anesthetic complications:   Airway: Mallampati: II  TM distance: >3 FB   Neck ROM: limited  Mouth opening: > = 3 FB Dental:          Pulmonary: breath sounds clear to auscultation  (+) shortness of breath (exert):      (-) COPD, asthma, sleep apnea and not a current smoker          Patient did not smoke on day of surgery. Cardiovascular:    (+) hypertension:, dysrhythmias: atrial fibrillation, SAN:, hyperlipidemia    (-) pacemaker, CAD, CABG/stent,  angina and  CHF    ECG reviewed  Rhythm: irregular  Rate: normal  Echocardiogram reviewed         Beta Blocker:  Not on Beta Blocker         Neuro/Psych:   (+) neuromuscular disease (rls, L leg in particular):,    (-) seizures, TIA and CVA           GI/Hepatic/Renal:   (+) liver disease (cirrhosis):, renal disease (bph/ ckd3): CRI,      (-) GERD and no morbid obesity       Endo/Other:    (+) Diabetes (no meds current)Type II DM, , blood dyscrasia: anemia, arthritis: OA., malignancy/cancer (colon / prostate / skin). (-) hypothyroidism, hyperthyroidism               Abdominal:           Vascular: negative vascular ROS. Anesthesia Plan      general     ASA 3       Induction: intravenous. MIPS: Postoperative opioids intended and Prophylactic antiemetics administered.   Anesthetic plan and risks discussed with patient and healthcare power of . Use of blood products discussed with legal guardian whom consented to blood products. Plan discussed with CRNA. This pre-anesthesia assessment may be used as a history and physical.    DOS STAFF ADDENDUM:    Pt seen and examined, chart reviewed (including anesthesia, drug and allergy history). No interval changes to history and physical examination. Anesthetic plan, risks, benefits, alternatives, and personnel involved discussed with patient. Patient verbalized an understanding and agrees to proceed.   POA , daughter In law, at bedside preop for h/p, plan, consent    Torres Cisneros MD  August 21, 2020  6:38 AM          Torres Cisneros MD   8/21/2020

## 2020-08-21 NOTE — PROGRESS NOTES
4 Eyes Skin Assessment   Four eyes skin assessment completed at this time by Owen Lewis RN   and Joel Jensen. Assessment revealed: mepilex placed on coccyx/sacrum in pacu for prevention measures, his coccyx / sacrum is c, d, I, blanchable, he has healing scratches in the left side to left flank area of body, bruising in diff healing stages on upper chest, and back, bilateral edema in ble with +1 pitting in rle, trace pitting in lle, pt is using 5 liters via nc, stable for transfer to [facility]   Pt  going by bed  Pt has received NO medications while in pacu    Surgical site dressing on abd is c, d, I, pain ball was applied in pacu related to had to wait on pharmacy to finish making it     Pt is sleeping, arousable pt is hard of hearing so doesn't respond as much to verbal ques     Alternative comfort measures used: repositioning, icepack, decreased lighting with pulled curtain, and aromatab    The patient is being assessed for  Admission    I agree that 2 RN's have performed a thorough Head to Toe Skin Assessment on the patient. ALL assessment sites listed below have been assessed.        Areas assessed by both nurses:  [x]   Head, Face, and Ears   [x]   Shoulders, Back, and Chest  [x]   Arms, Elbows, and Hands   [x]   Coccyx, Sacrum, and Ischum  [x]   Legs, Feet, and Heels              Owen eLwis RN

## 2020-08-21 NOTE — PROGRESS NOTES
Pt's fsbs is 189, dr agee gave verbal order with repeat back, no coverage for fsbs    Also pt needs to be on a telemonitor per dr Nyasia Alejandre that can be 2w or pcu, clinical updated    mepilex placed on coccyx/sacrum in pacu for prevention measures, his coccyx / sacrum is c, d, I, blanchable, he has healing scratches in the left side to left flank area of body, bruising in diff healing stages on upper chest, and back,     Surgical site dressing on abd is c, d, I, pain ball was applied in pacu related to had to wait on pharmacy to finish making it    Pt is sleeping, arousable pt is hard of hearing so doesn't respond as much to verbal ques    Alternative comfort measures used: repositioning, icepack, decreased lighting with pulled curtain, and aromatab    Pt's daughter updated of pt's admit bed 213,

## 2020-08-21 NOTE — CARE COORDINATION
[facility] Transitions Initial Follow Up Call    Call within 2 business days of discharge: Yes    Patient: Mandy Latif Patient : 1924   MRN: <[id number]>  Reason for Admission:   Adenoma of the Prostate  Discharge Date: 20 RARS: Readmission Risk Score: 23        Facility:   [facility]      Follow Up:  Initial contact deferred. Per chart patient is currently in OR/ Sigmoid Colectomy/ Colostomy. Care Transition to follow at discharge.   Future Appointments   Date Time Provider Alexi Chavis   9/3/2020  9:50 AM MD Solitario Gibson None       Em Garner, JA

## 2020-08-21 NOTE — BRIEF OP NOTE
Brief Postoperative Note      Patient: Carmen [place]  YOB: 1924  MRN: [id number]    Date of Procedure: 8/21/2020    Pre-Op Diagnosis: MALIGNANT NEOPLASM OF SIGMOID COLON    Post-Op Diagnosis: Same and cecal mass with involvement of Meckels' diverticulum       Procedure(s):  SIGMOID COLECTOMY, COLOSTOMY, RIGHT COLECTOMY WITH EN BLOC RESECTION OF MECKEL'S DIVERTICULUM    Surgeon(s):  Melania Henry MD    Assistant:  Surgical Assistant: Juan Hernandez    Anesthesia: General    Estimated Blood Loss (mL): less than 143     Complications: None    Specimens:   ID Type Source Tests Collected by Time Destination   A : COLON Tissue Tissue SURGICAL PATHOLOGY Melania Henry MD 8/21/2020 0706        Implants:  * No implants in log *      Drains:   Colostomy (Active)       Urethral Catheter Non-latex 16 fr (Active)       Findings: AS ABOVE    Electronically signed by Pascual Tan MD on 8/21/2020 at 8:50 AM

## 2020-08-21 NOTE — ANESTHESIA POSTPROCEDURE EVALUATION
Department of Anesthesiology  Postprocedure Note    Patient: Fabienne Parrish  MRN: [id number]  YOB: 1924  Date of evaluation: 8/21/2020  Time:  10:35 AM     Procedure Summary     Date:  08/21/20 Room / Location:  [address] [place] [address] / [facility]    Anesthesia Start:  0730 Anesthesia Stop:  3887    Procedure:  SIGMOID COLECTOMY, COLOSTOMY, RIGHT COLECTOMY (N/A Abdomen) Diagnosis:  (MALIGNANT NEOPLASM OF SIGMOID COLON)    Surgeon:  Peg Fisher MD Responsible Provider:  Grant Cason MD    Anesthesia Type:  general ASA Status:  3          Anesthesia Type: general    Joellen Phase I: Joellen Score: 7    Joellen Phase II:      Last vitals: Reviewed and per EMR flowsheets.      Vitals:    08/21/20 0930 08/21/20 0935 08/21/20 0940 08/21/20 0945   BP: (!) 125/56 (!) 125/56 117/64 117/64   Pulse: 110 103 107 107   Resp: 18 26 14 19   Temp:       TempSrc:       SpO2: 99% 96% 94% 94%   Weight:       Height:         Anesthesia Post Evaluation    Patient location during evaluation: bedside  Patient participation: complete - patient participated  Level of consciousness: awake, responsive to verbal stimuli and confused (confusion as preop, family answered questions)  Airway patency: patent  Nausea & Vomiting: no nausea  Complications: no  Cardiovascular status: hemodynamically stable  Respiratory status: face mask and nasal cannula  Hydration status: euvolemic

## 2020-08-21 NOTE — PROGRESS NOTES
Patient educated on discharge instructions as well as new medications use, dosage, administration and possible side effects. Patient verified knowledge. IV removed without difficulty and dry dressing in place. Telemetry monitor removed and returned to [facility]. Pt left facility in stable condition to Home with all of their personal belongings. Report called to floor bed ready

## 2020-08-21 NOTE — OP NOTE
down distal to the mass and  dissected the sigmoid rectal junction circumferentially. The rectum was  divided distal to the mass and the sigmoid colon was divided proximal to  the mass. The mass was excised by dividing the mesenteric vascular  attachments using the LigaSure device. This was set aside and I turned  my attention to the right colon. The right colon was mobilized by  incising the lateral peritoneal attachments. It was delivered into the  incision. The colon was divided in the mid ascending colon distal to  the mass. The small bowel was divided about a foot proximal to where I  normally would divide in order to encompass the base of the Meckel's. The medial terminal ileum and proximal ascending colon were placed  adjacent to each other and secured with silk sutures. Enterotomies were  made and a side-to-side, functional end-to-end anastomosis was created  with the firing of the stapler. This was closed off with a second  firing of the stapler. The corners, apex, and staple line intersections  were all oversewn with silk sutures. The anastomosis was widely patent. The involved section of colon had been excised using the LigaSure device  to divide the mesenteric vascular attachments. The ascending colon  returned to its normal anatomic position. A site was chosen in the left  lower quadrant for the colostomy and a disc of skin and subcutaneous  tissue were excised. Due to _____, a cruciate incision was made in the  fascia. Upon examining the sigmoid colon, he had a tethered area of  mesentery and this was divided and a small additional segment of colon  resected using the stapler in order to give us a better angle for the  colostomy. The staple line end of the sigmoid was delivered through the  incision and a small skin nick was created superior to the midline  incision. On-Q catheters were tunneled laterally on either side. The  fascia was closed with running suture of #1 Prolene. The subcutaneous  tissues were irrigated and the skin was closed with staples. The staple  line was excised from the distal end of the colon and everted in a  rosebud fashion with interrupted sutures of 3-0 Vicryl. A colostomy bag  and dry dressing were applied. All sponge, needle, and instrument  counts were correct at the end of the case. The patient was taken to  the recovery area in stable condition.         García Edmonds MD    D: 08/21/2020 9:31:40       T: 08/21/2020 9:43:55     [email or web address]  Job#: [id number]     Doc#: [id number]    CC:  MD Alberta Plummer MD

## 2020-08-21 NOTE — H&P
I have reviewed the progress note serving as history and physical dated August/6/ 2020 and examined the patient and find no relevant changes. I have reviewed with the patient and/or family the risks, benefits, and alternatives to the procedure.

## 2020-08-21 NOTE — PROGRESS NOTES
RESPIRATORY THERAPY ASSESSMENT    Name:  Amy Beaver  Medical Record Number:  [id number]  Age: 80 y.o. Gender: male  : 1924  Today's Date:  2020  Room:  [facility]30213-01    Assessment     Is the patient being admitted for a COPD or Asthma exacerbation? No   (If yes the patient will be seen every 4 hours for the first 24 hours and then reassessed)    Patient Admission Diagnosis      Allergies  No Known Allergies    Minimum Predicted Vital Capacity:               Actual Vital Capacity:                    Pulmonary History:No history  Home Oxygen Therapy:  room air  Home Respiratory Therapy:Albuterol/Ipratropium Bromide MDI   Current Respiratory Therapy: albuterol/ ipatropium bromide MDI prn           Respiratory Severity Index(RSI)   Patients with orders for inhalation medications, oxygen, or any therapeutic treatment modality will be placed on Respiratory Protocol. They will be assessed with the first treatment and at least every 72 hours thereafter. The following severity scale will be used to determine frequency of treatment intervention.     Smoking History: No Smoking History = 0    Social History  Social History     Tobacco Use    Smoking status: Never Smoker    Smokeless tobacco: Never Used   Substance Use Topics    Alcohol use: No    Drug use: No       Recent Surgical History: Lower Abdominal = 2  Past Surgical History  Past Surgical History:   Procedure Laterality Date    COLONOSCOPY N/A 2020    COLONOSCOPY WITH BIOPSY performed by Tito Dallas MD at [facility]  2020 Dr Corbin Coleman did a SIGMOID COLECTOMY, COLOSTOMY, RIGHT COLECTOMY WITH  [facility] RESECTION OF MECKEL'S DIVERTICULUM    TURP  2019    TURP N/A 2019    CYSTOSCOPY TRANSURETHRAL RESECTION PROSTATE, CYSTOLITHOPAXY performed by Jewels Mendez MD at [facility] 2020    EGD BIOPSY [facility] Sal[facility]       Level of Consciousness: Alert, Oriented, and Cooperative = 0    Level of Activity: Mostly sedentary, minimal walking = 2    Respiratory Pattern: Regular Pattern; RR 8-20 = 0    Breath Sounds: Diminshed bilaterally and/or crackles = 2    Sputum   ,  ,    Cough: Strong, spontaneous, non-productive = 0    Vital Signs   /74   Pulse 109   Temp 98.3 °F (36.8 °C) (Axillary)   Resp 16   Ht 5' 6\" (1.676 m)   Wt 150 lb (68 kg)   SpO2 100%   BMI 24.21 kg/m²   SPO2 (COPD values may differ): 86-87% on room air or greater than 92% on FiO2 35- 50% = 3    Peak Flow (asthma only): not applicable = 0    RSI: 2-37 = TID (three times daily) and Q4hr PRN for dyspnea        Plan       Goals: medication delivery, mobilize retained secretions, volume expansion and improve oxygenation    Patient/caregiver was educated on the proper method of use for Respiratory Care Devices:  No:       Level of patient/caregiver understanding able to:   ? Verbalize understanding   ? Demonstrate understanding       ? Teach back        ? Needs reinforcement       ? No available caregiver               ? Other:     Response to education:  na     Is patient being placed on Home Treatment Regimen? No     Does the patient have everything they need prior to discharge? NA     Comments: pt assessed and chart reviewed    Plan of Care: change tx's to TID    Electronically signed by Jesi Corral on 8/21/2020 at 11:25 AM    Respiratory Protocol Guidelines     1. Assessment and treatment by Respiratory Therapy will be initiated for medication and therapeutic interventions upon initiation of aerosolized medication. 2. Physician will be contacted for respiratory rate (RR) greater than 35 breaths per minute. Therapy will be held for heart rate (HR) greater than 140 beats per minute, pending direction from physician.   3. Bronchodilators will be administered via Metered Dose Inhaler (MDI) with spacer when the following criteria are met:  a. Alert and cooperative     b. HR < 140 bpm  c. RR < 30 bpm                d. Can demonstrate a 2-3 second inspiratory hold  4. Bronchodilators will be administered via Hand Held Nebulizer [facility]) to patients when ANY of the following criteria are met  a. Incognizant or uncooperative          b. Patients treated with HHN at Home        c. Unable to demonstrate proper use of MDI with spacer     d. RR > 30 bpm   5. Bronchodilators will be delivered via Metered Dose Inhaler (MDI), HHN, Aerogen to intubated patients on mechanical ventilation. 6. Inhalation medication orders will be delivered and/or substituted as outlined below. Aerosolized Medications Ordering and Administration Guidelines:    1. All Medications will be ordered by a physician, and their frequency and/or modality will be adjusted as defined by the patients Respiratory Severity Index (RSI) score. 2. If the patient does not have documented COPD, consider discontinuing anticholinergics when RSI is less than 9.  3. If the bronchospasm worsens (increased RSI), then the bronchodilator frequency can be increased to a maximum of every 4 hours. If greater than every 4 hours is required, the physician will be contacted. 4. If the bronchospasm improves, the frequency of the bronchodilator can be decreased, based on the patient's RSI, but not less than home treatment regimen frequency. 5. Bronchodilator(s) will be discontinued if patient has a RSI less than 9 and has received no scheduled or as needed treatment for 72  Hrs. Patients Ordered on a Mucolytic Agent:    1. Must always be administered with a bronchodilator. 2. Discontinue if patient experiences worsened bronchospasm, or secretions have lessened to the point that the patient is able to clear them with a cough. Anti-inflammatory and Combination Medications:    1.  If the patient lacks prior history of lung disease, is not using inhaled anti-inflammatory medication at home, and lacks wheezing by examination or by history for at least 24 hours, contact physician for possible discontinuation.

## 2020-08-22 NOTE — FLOWSHEET NOTE
08/22/20 0400   Vital Signs   Temp 97.6 °F (36.4 °C)   Temp Source Axillary   Pulse 96   Resp 18   /71   BP Location Left upper arm   Level of Consciousness 0   MEWS Score 1   Oxygen Therapy   SpO2 100 %   O2 Device Nasal cannula   O2 Flow Rate (L/min) 2 L/min   Pt c/o abd pain. Pt given Dilaudid 0.5 mg IV. Pt given moistened mouth swab with water for c/o dry mouth.  Lesa Santamaria

## 2020-08-22 NOTE — FLOWSHEET NOTE
08/21/20 2000   Vital Signs   Temp 97.4 °F (36.3 °C)   Temp Source Axillary   Pulse 96   Resp 16   /74   BP Location Left upper arm   Level of Consciousness 1   MEWS Score 2   Pain Assessment   Pain Level 0   Oxygen Therapy   SpO2 96 %   O2 Device Nasal cannula   O2 Flow Rate (L/min) 2 L/min   Pt resting in bed, eyes closed. Resp easy/even. Held po meds due to pt very drowsy at this time. Wakes up easily.   Arturo Linear

## 2020-08-22 NOTE — PROGRESS NOTES
Pt has had no acute changes this shift. Will continue to monitor until the oncoming nurse assumes care.

## 2020-08-22 NOTE — CARE COORDINATION
Advance Care Planning   Advance Care Planning Clinical Specialist  Conversation Note      Date of ACP Conversation: 8/22/2020    [facility] Conducted with:   Patient with [address]: Next of Kin by law (only applies in absence of above) [employer] ACP Clinical Specialist: Cady Bae RN      *When Decision Maker makes decisions on behalf of the incapacitated patient: Decision Maker is asked to consider and make decisions based on patient values, known preferences, or best interests. Current Designated Health Care Decision Maker:   (as [facility] in [facility] field. Validate  this information as still accurate & up-to-date; edit [place] field as needed.)    If no Decision Maker listed above or available through scanned documents, then:    [address] [place] [address]   Who do you trust to make healthcare decisions for you? Name:    Sofie HEAD Phone  Number: [phone]  Can this person be reached and be able to respond quickly, such as within a few minutes or hours? Yes    Who would be your back-up decision maker? Name N/A  Phone Number:N/A    For below questions, when conducting conversation with Conner, substitute \"he\" and \"his\" for \"you\" and \"your\". Hospitalization  If your health were to worsen and it became clear that your chance of recovery was unlikely, what would your preferences be regarding hospitalization?:    Choice:  [x]  The patient would want hospitalization  []  The patient would prefer comfort-focused treatment without hospitalization. Ventilation  If you were in your present state of health and suddenly became very ill and were unable to breathe on your own, what would your preference be about the use of a ventilator (breathing machine) if it were available to you?       If patient would desire the use of a ventilator (breathing machine), answer

## 2020-08-22 NOTE — CARE COORDINATION
Case Management Assessment  Initial Evaluation      Patient Name: India Pedraza  YOB: 1924  Diagnosis: Colon cancer [facility]) [C18.9]  Date / Time: 8/21/2020  6:16 AM    Admission status/Date: 8/21/20 inpatient  Chart Reviewed: Yes      Patient Interviewed: Yes   Family Interviewed:  Yes - RONNIE      Hospitalization in the last 30 days:  Yes      Primary Contact Information:   Who do you trust or have selected to make healthcare decisions for you? Name:   Rachel Driscoll  Phone  Number: [phone]  Can this person be reached and be able to respond quickly, such as within a few minutes or hours? Yes  Who would be your back-up decision maker?   Name N/A  Phone Number:N/A    Met with: spoke with RONNIE via phone as pt very [place]  Interview conducted  (bedside/phone):    Current PCP:  Harika Little  65.  Medicare  Precert required for SNF :  N          3 night stay required - Y    ADLS  Support Systems/Care Needs: Children, Family Members  Transportation: family    Meal Preparation: family    Housing  Living Arrangements: lives 2 story home  Steps: ramp  Intent for return to present living arrangements: Yes  Identified Issues:     [address] with Home Health Care : No Agency:(Services)  Type of Home Care Services: None  Passport/Waiver : No  :                      Phone Number:    Passport/Waiver Services:           Durable Medical Equiptment   DME Provider: N/A  Equipment:   Walker_x__Cane___RTS_x__ BSC___Shower Chair_x__Hospital Bed___W/C____Other________  02 at ____Liter(s)---wears(frequency)_______ HHN ___ CPAP___ BiPap___   N/A____      Home O2 Use :  No    If No for home O2---if presently on O2 during hospitalization:  Yes  if yes CM to follow for potential DC O2 need  Informed of need for care provider to bring portable home O2 tank on day of discharge for nursing to connect prior to leaving:   Not Indicated  Verbalized agreement/Understanding:   Not Indicated    Community

## 2020-08-22 NOTE — FLOWSHEET NOTE
08/21/20 2341   Vital Signs   Temp 97.5 °F (36.4 °C)   Temp Source Oral   Pulse 103   Resp 16   /80   BP Location Left upper arm   Level of Consciousness 0   MEWS Score 2   Pain Assessment   Pain Level 7   Oxygen Therapy   SpO2 97 %   O2 Device Nasal cannula   O2 Flow Rate (L/min) 2 L/min   Pt c/o abd pain 7/10. Dilaudid 0.5 mg IV given. Pt requested mouth swab dipped in water. Gave this per his request. Pt more awake and alert.  Lesa Santamaria

## 2020-08-22 NOTE — PROGRESS NOTES
[facility] GENERAL SURGERY    Surgery Progress Note           POD # 1    PATIENT NAME: John Macdonald     TODAY'S DATE: 8/22/2020    INTERVAL HISTORY:    Pt  Awake, c/o appropriate incisional pain, and is thirsty. OBJECTIVE:   VITALS:  /77   Pulse 101   Temp 97.1 °F (36.2 °C) (Oral)   Resp 18   Ht 5' 6\" (1.676 m)   Wt 150 lb (68 kg)   SpO2 97%   BMI 24.21 kg/m²     INTAKE/OUTPUT:    I/O last 3 completed shifts: In: 1100 [I.V.:1100]  Out: 650 [Urine:550; Blood:100]  No intake/output data recorded. CONSTITUTIONAL:  fatigued and alert  LUNGS:  clear to auscultation  ABDOMEN:   absent bowel sounds, soft, non-distended, tenderness noted at incision   INCISION: clean, dry, stoma - pink, no stool/gas    Data:  CBC:   Recent Labs     08/21/20  0644 08/22/20  0543   WBC 8.4 10.1   HGB 7.8* 7.7*   HCT 27.9* 29.8*    275     BMP:    Recent Labs     08/21/20  0644 08/22/20  0543   * 135*   K 4.1 4.7   CL 97* 102   CO2 27 23   BUN 21* 26*   CREATININE 1.1 1.3   GLUCOSE 135* 148*     Hepatic: No results for input(s): AST, ALT, ALB, BILITOT, ALKPHOS in the last 72 hours. Mag:    No results for input(s): MG in the last 72 hours. Phos:   No results for input(s): PHOS in the last 72 hours. INR: No results for input(s): INR in the last 72 hours.       Radiology Review:       ASSESSMENT AND PLAN:  80 y.o. male status post ex lap, sigmoidectomy/colostomy, ileocecectomy w/ Meckel's resection for cancer x2  - will trial sips of clears   - up in bed, or chair every shift   -   - continue Sharif catheter today, hopefully d/c tomorrow   - CV - stable, no issues   - Renal - low UOP despite IVF + bolus x1                 - will give another gentle bolus today   - Pain - well-controlled w/ pain ball, prn meds   - Prophy - cont Hep sq        Electronically signed by Rigoberto Jewell MD

## 2020-08-22 NOTE — FLOWSHEET NOTE
08/21/20 1045   Vital Signs   Temp 98.3 °F (36.8 °C)   Temp Source Axillary   Pulse 109   Heart Rate Source Monitor   Resp 16   /74   Level of Consciousness 0   MEWS Score 2   Oxygen Therapy   SpO2 100 %   O2 Device Nasal cannula   O2 Flow Rate (L/min) 5 L/min   Patient Observation   Observations turned down to 4L   PT arrived to room from PACU. Admission assessment completed, see flow sheet. Pt is alert to pain only at this point. Vital signs are WNL. Respirations are even & easy. Daughter at bedside, admission questions completed. No s/s of pain. Sharif draining clear yellow urine. Colostomy with no output and bowel sounds active. SR up x 2, and bed in low position. Call light is within reach. Bed alarm in place for pt safety.

## 2020-08-22 NOTE — PROGRESS NOTES
Perfect served md r/t pts fsbs trending up as the day goes on and pt is now on a clear liquid diet and tolerating it well. Awaiting respose. 5088 Md read at [phone] and no orders noted.

## 2020-08-23 NOTE — PROGRESS NOTES
[facility] GENERAL SURGERY    Surgery Progress Note           POD # 2    PATIENT NAME: Amy Beaver     TODAY'S DATE: 8/23/2020    INTERVAL HISTORY:    Pt  Awake but somnolent, taking some clears but having some coughing while drinking, per RN. Denies pain. OBJECTIVE:   VITALS:  /70   Pulse 92   Temp 97.5 °F (36.4 °C) (Oral)   Resp 20   Ht 5' 6\" (1.676 m)   Wt 150 lb (68 kg)   SpO2 100%   BMI 24.21 kg/m²     INTAKE/OUTPUT:    I/O last 3 completed shifts: In: 120 [P.O.:120]  Out: 755 [Urine:725; Stool:30]  I/O this shift: In: 5 [P.O.:720]  Out: 125 [Urine:100; Stool:25]              CONSTITUTIONAL:  fatigued and somnolent  LUNGS:     ABDOMEN:    , soft, non-distended, tenderness noted at incision   INCISION: clean, dry, ostomy - copious stool in pouch    Data:  CBC:   Recent Labs     08/21/20  0644 08/22/20  0543   WBC 8.4 10.1   HGB 7.8* 7.7*   HCT 27.9* 29.8*    275     BMP:    Recent Labs     08/21/20  0644 08/22/20  0543   * 135*   K 4.1 4.7   CL 97* 102   CO2 27 23   BUN 21* 26*   CREATININE 1.1 1.3   GLUCOSE 135* 148*     Hepatic: No results for input(s): AST, ALT, ALB, BILITOT, ALKPHOS in the last 72 hours. Mag:    No results for input(s): MG in the last 72 hours. Phos:   No results for input(s): PHOS in the last 72 hours. INR: No results for input(s): INR in the last 72 hours.       Radiology Review:       ASSESSMENT AND PLAN:  80 y.o. male status post ex lap, sigmoidectomy/colostomy, ileocecectomy w/ Meckel's resection for cancer x2   - stick w/ clears slowly for now   - Speech Path eval in AM before advancing diet   - SSI for hyperglycemia   -  PT/OT        Electronically signed by Rigoberto Casarez MD

## 2020-08-23 NOTE — PROGRESS NOTES
Pt noted to be coughing with fluid and jello. Spoke with md and requested to be npo til speech sees in the am r/t aspiration. md ok for pt to be npo. Also reported that had stopped ivfs this am since he was drinking and concerned about fluid overload but asked if ok to resume while npo. md stated that will be ok.

## 2020-08-23 NOTE — PROGRESS NOTES
Attempted 2 times for new IV site. Unsuccessful. Notified clinical [employer] for new IV site.  Raissa Fu

## 2020-08-23 NOTE — FLOWSHEET NOTE
20   Vital Signs   Temp 97.8 °F (36.6 °C)   Temp Source Oral   Pulse 100   Resp 16   /84   BP Location Left upper arm   Level of Consciousness 0   MEWS Score 1   Oxygen Therapy   SpO2 98 %   O2 Device Nasal cannula   O2 Flow Rate (L/min) 2 L/min   Pt awake, confused to time/place. Oriented to name/. Pt was attempting to sit up on side of bed. Attempted to reorient pt and explain he can't get OOB without assist of staff. Telesitter placed in room. Bed alarm on. Found IV out of pt's arm, bed change completed due to IVF leaking all over pt's bed. Pt c/o pain, unable to give pain med until IV restarted.  Kristin Tracy

## 2020-08-24 NOTE — PLAN OF CARE
Problem: Nutrition  Intervention: Swallowing evaluation  Note: Bedside swallow evaluation completed this date. Aron Mayes M.S. Adamaris Douglas  Speech-language pathologist  [id number]      Intervention: Aspiration precautions  Note: Bedside swallow evaluation completed this date.     Aron Mayes M.S. Adamaris Douglas  Speech-language pathologist  [phone]

## 2020-08-24 NOTE — PROGRESS NOTES
Inpatient Physical Therapy Evaluation and Treatment    Unit: [facility]  Date:  8/24/2020  Patient Name:    Jeromy Redd  Admitting diagnosis:  Colon cancer [facility]) [C18.9]  Admit Date:  8/21/2020  Precautions/Restrictions/WB Status/ Lines/ Wounds/ Oxygen: Fall risk, Bed/chair alarm, Lines -IV, Supplemental O2 (2.5 L), colostomy bag, Sharif catheter and lower midline abdominal incision, [place] (hard of hearing), Telemetry and Telesitter  S/P SIGMOID COLECTOMY, COLOSTOMY (8/21/2020)    Treatment Time: 1100 - 1133  Treatment Number:  1   Timed Code Treatment Minutes: 23 minutes + Eval  Total Treatment Minutes:  33  minutes    Patient Goals for Therapy: Unable to state goals. 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 Home Health S4 Level Recommendation:  NA  AM-PAC Mobility Score    AM-PAC Inpatient Mobility Raw Score : 14     Preadmission Environment    Pt. Roxanne Toth family (Son-Ozzie). Pt states having [address] son is not home  Home environment:    one story home  Steps to enter first floor:  ramp       Steps to second floor: no hand rail  Bathroom:       Walk-in Shower, Grab bars and Shower Chair, raised toilet with arms   Equipment owned:      Rolling Walker, Rollator  and raised toilet seat w/o arms with grab bars near toilet     Preadmission Status / PLOF:  History of falls Yes and states that son is able to get him up, unsure of reliability   Pt.  Able to drive No, Son Geoff Nicole) able to provide transportation   Pt Fully independent with neutral alignment and pressure relief provided. Call light provided and all needs within reach    Exercises Initiated  all completed bilaterally unless indicated  Ankle Pumps x 10 reps    Other  None. Patient/Family Education   Pt educated on role of inpatient PT, POC, importance of continued activity, DC recommendations, safety awareness, transfer techniques, pursed lip breathing, energy conservation, pacing activity and calling for assist with mobility. Assessment  Pt seen for Physical Therapy evaluation in acute care setting. Patient needed increased time to perform activities but was able to follow one step commands consistently. Pt demonstrated decreased Activity tolerance, Balance, Safety and Strength as well as decreased independence with Ambulation, Bed Mobility  and Transfers. Recommending SNF upon discharge as patient functioning well below baseline, demonstrates good rehab potential and unable to return home due to limited or no family support, burden of care beyond caregiver ability, home environment not conducive to patient recovery and limited safety awareness. Goals : To be met in 3 visits:  1). Independent with LE Ex x 10 reps    To be met in 6 visits:  1). Supine to/from sit: CGA  2). Sit to/from stand: CGA  3). Bed to chair: CGA  4). Gait: Ambulate 40 ft.   with  CGA and use of gait belt and rolling walker (RW)  5). Tolerate B LE exercises 3 sets of 10-15 reps    Rehabilitation Potential: Fair  Strengths for achieving goals include:   Pt motivated and Pt cooperative   Barriers to achieving goals include:    Weakness    Plan    To be seen 3-5 x / week  while in acute care setting for therapeutic exercises, bed mobility, transfers, progressive gait training, balance training, and family/patient education. Signature: Barbi Sweeney MS PT, # [id number]      If patient discharges from this facility prior to next visit, this note will serve as the Discharge Summary.

## 2020-08-24 NOTE — FLOWSHEET NOTE
08/23/20 2016   Vital Signs   Temp 96.9 °F (36.1 °C)   Temp Source Oral   Pulse 97   Resp 18   /79   BP Location Left upper arm   Level of Consciousness 0   MEWS Score 1   Oxygen Therapy   SpO2 95 %   O2 Device None (Room air)   Pt resting in bed, po meds held r/t NPO status due to possible aspiration. Pt c/o abd pain. Dilaudid 0.5 mg IV given. Colostomy bag was leaking. Bed change completed. New colostomy bag applied. Pt tolerated well.  Ana Hernandez

## 2020-08-24 NOTE — PROGRESS NOTES
[facility] GENERAL SURGERY DAILY PROGRESS NOTE    SUBJECTIVE: Awake, alert    OBJECTIVE: CURRENT VITALS:  /86   Pulse 57   Temp 97 °F (36.1 °C) (Oral)   Resp 18   Ht 5' 6\" (1.676 m)   Wt 150 lb (68 kg)   SpO2 99%   BMI 24.21 kg/m²          ABD: Soft  INCISION:  C/D/I  COLOSTOMY:  Functional    LABS:    CBC:   Recent Labs     08/22/20  0543   WBC 10.1   RBC 4.25   HGB 7.7*   HCT 29.8*   MCV 70.1*   RDW 20.1*        BMP:   Recent Labs     08/22/20  0543   *   K 4.7      CO2 23   BUN 26*   CREATININE 1.3           ASSESSMENT:   POD 3 Tim's  / R colectomy      PLAN:   PT/OT  Decrease IVF  Diet trial         Jose Espinal

## 2020-08-24 NOTE — PROGRESS NOTES
Am labs were not done r/t unable to obtain. Pt was stuck 10x. Clinical supervisor also tried. md on unit and made aware, Dr. Nilam Nascimento.

## 2020-08-24 NOTE — PROGRESS NOTES
Inpatient Occupational Therapy  Evaluation and Treatment    Unit: [facility]  Date:  8/24/2020  Patient Name:    Mauri Cortés  Admitting diagnosis:  Colon cancer [facility]) [C18.9]  Admit Date:  8/21/2020  Precautions/Restrictions/WB Status/ Lines/ Wounds/ Oxygen: fall risk, IV, bed/chair alarm, guido catheter , supplemental O2 (2.5L), telemetry and telesitter    Treatment Time:  11:06-11:33  Treatment Number: 1     Billable Treatment Time: 27 minutes   Total Treatment Time:   17   minutes    Patient Goals for Therapy:  \" to get stronger \"      Discharge Recommendations: SNF  DME needs for discharge: defer to facility       Therapy recommendations for staff:   Assist of 2 with use of rolling walker (RW) for all transfers to/from BSC/chair    History of Present Illness: 58-year-old gentleman who presented from  GI with recent diagnosis of a sigmoid stricture with biopsy showing  cancer. Given his limited mobility, I suggested a sigmoid resection  with creation of a colostomy to limit his risk. The patient and the  family were agreeable and he is brought for surgery. Home Health S4 Level Recommendation:  NA  AM-PAC Score: AM-PAC Inpatient Daily Activity Raw Score: 11    Preadmission Environment    Pt. Lives with family (DIL ) and son   Home environment:  two story home  Steps to enter first floor:   ramp    Steps to second floor: N/A  Bathroom:  Walk-in Shower, Shower Chair  and Raised toilet seat w/ arms  Equipment owned:  Rufus Singer (DEV)     Preadmission Status / PLOF:  History of falls   Yes and states that son is able to get him up, unsure of reliability   Pt. Able to drive   No  Pt Fully independent with ADL's  No  Pt. Required assistance from family for:  Bathing, Cleaning, Cooking, Dressing and Laundry     Pt.  Fully independent for transfers and gait and walked with: Walker    Pain  No  Rating:NA  Location:NA  Pain Medicine Status: Denies need      Cognition    A&O Person, Place and Time   Able to follow 1 step commands    Subjective  Patient lying supine in bed with no family present - no visitors present due to COVID-19 restrictions  Pt agreeable to this OT eval & tx. Upper Extremity ROM:    Impaired L 30* AROM shoulder, elbow [facility]  WFL R UE     Upper Extremity Strength:    BUE strength impaired but not formally assessed w/ MMT      Upper Extremity Sensation    WFL    Upper Extremity Proprioception:  WFL    Coordination and Tone  WFL    Balance  Functional Sitting Balance:  Impaired  Functional Standing Balance:Impaired    Bed mobility:    Supine to sit:   Min A  Sit to supine:   Not Tested  Rolling:    Not Tested  Scooting in sitting:  Min A  Scooting to head of bed:   Not Tested    Bridging:   Not Tested    Transfers:    Sit to stand:  Min A and Mod A to maintain balance   Stand to sit:  Mod A, needed guidance for safety   Bed to chair:   Mod A of 2 and with use of RW  Standard toilet: Not Tested  Bed to [facility]:  Not Tested    Dressing:      UE:   Not Tested  LE:    Not Tested    Bathing:    UE:  Not Tested  LE:  Not Tested    Eating:   Supervision    Toileting:  Not Tested    Activity Tolerance   Pt completed therapy session with No adverse symptoms noted w/activity    Positioning Needs:   Up in chair, call light and needs in reach. Exercise / Activities Initiated:   N/A    Patient/Family Education:   Role of OT  Energy conservation techniques  Safe RW use/hand placement    Assessment of Deficits: Pt seen for Occupational therapy evaluation in acute care setting. Pt demonstrated decreased Activity tolerance, ADLs, Balance , Bathing, Bed mobility, Dressing, Safety Awareness, Strength and Transfers. Pt functioning below baseline and will likely benefit from skilled occupational therapy services to maximize safety and independence. Goal(s) : To be met in 3 Visits:  1). Bed to toilet/BSC: Min A    To be met in 5 Visits:  1). Supine to/from Sit:  SBA  2). Upper Body Bathing:   SBA  3).  Lower Body Bathing:

## 2020-08-24 NOTE — PROGRESS NOTES
Complaint  Patient Complaint: Pt denies dysphagia. Pain:  Pain Assessment  Pain Assessment: 0-10  Pain Level: 0  Patient's Stated Pain Goal: 3  Pain Type: Surgical pain  Pain Location: Abdomen  Pain Orientation: Mid  Response to Pain Intervention: Asleep with RR greater than 10  POSS Score (Patient Ctrl Analgesia): S    Reason for Referral  Malcom Huizar was referred for a bedside swallow evaluation to assess the efficiency of his swallow function, identify signs and symptoms of aspiration and make recommendations regarding safe dietary consistencies, effective compensatory strategies, and safe eating environment. Impression  Dysphagia Diagnosis: Mild oral stage dysphagia;Mild pharyngeal stage dysphagia; Suspected needs further assessment  Dysphagia Outcome Severity Scale: Level 4: Mild moderate dysphagia- Intermittent supervision/cueing. One - two diet consistencies restricted   Pt seen upright in bed, alert and agreeable to evaluation, RN OK'd SLP entry and evaluation. Pt extremely [place]. See oral / pharyngeal phase areas below regarding BSE results / Viridiana Blakely. Treatment Plan  Requires SLP Intervention: Yes  Duration/Frequency of Treatment: 2-4x/week for LOS  D/C Recommendations: To be determined       Recommended Diet and Intervention  Diet Solids Recommendation: (Clear liquid diet per MD; pt previously on Dysphagia III (soft and bite sized) diet per previous SLP recs)  Liquid Consistency Recommendation: Thin  Recommended Form of Meds: Meds in puree  Recommendations: Modified barium swallow study; Dysphagia treatment  Therapeutic Interventions: Diet tolerance monitoring;Oral care; Patient/Family education    Compensatory Swallowing Strategies  Compensatory Swallowing Strategies: Upright as possible for all oral intake;Remain upright for 30-45 minutes after meals;Eat/Feed slowly; Small bites/sips    Treatment/Goals  Short-term Goals  Timeframe for Short-term Goals: 3 days (8/27/20)  Long-term Goals  Timeframe for Long-term Goals: 5 days (8/29/20)  Goal 1: The pt will tolerate safest and least restrictive diet without s/s of aspiration. Dysphagia Goals: The patient will tolerate recommended diet without observed clinical signs of aspiration; The patient/caregiver will demonstrate understanding of compensatory strategies for improved swallowing safety. ;The patient will tolerate instrumental swallowing procedure    General  Chart Reviewed: Yes  Comments: Pt s/p colostomy 8/21/20. Pt initially on clear liquid diet, however, reports of coughing with clears yesterday, concern for aspiration, and pt was made NPO with BSE consult. Behavior/Cognition: Cooperative; Alert;Pleasant mood; Requires cueing  Respiratory Status: O2 via nasual cannula  O2 Device: Nasal cannula  Liters of Oxygen: 3 L (pt denies O2 at baseline)  Communication Observation: Functional  Follows Directions: Simple  Dentition: Some missing teeth(Pt lacking bottom dentition)  Patient Positioning: Upright in bed  Baseline Vocal Quality: Normal  Volitional Cough: Strong  Prior Dysphagia History: Pt had recent BSE completed on 8/19 with recommendations of Dysphagia III (soft and bite sized) diet with thin liquids. Of note, pt has been seen multiple times by ST in July and August 2020 with same recommendations. Consistencies Administered: Dysphagia Minced and Moist (Dysphagia II); Thin - straw; Thin - cup; Ice Chips(Jello (mech soft))           Vision/Hearing  Vision  Vision: Impaired  Vision Exceptions: Wears glasses at all times  Hearing  Hearing: Exceptions to [facility]  Hearing Exceptions: Hard of hearing/hearing concerns    Oral Motor Deficits  Oral/Motor  Oral Motor: Exceptions to WFL(Pt [place], difficulty understanding directions)  Labial ROM: Reduced left; Reduced right  Labial Strength: Reduced  Labial Coordination: Reduced  Lingual ROM: Reduced left; Reduced right  Lingual Strength: Reduced  Lingual Coordination: Reduced    Oral Phase Dysfunction  Oral Phase  Oral

## 2020-08-24 NOTE — PROGRESS NOTES
RESPIRATORY THERAPY ASSESSMENT    Name:  Malcom Huizar  Medical Record Number:  [id number]  Age: 80 y.o. Gender: male  : 1924  Today's Date:  2020  Room:  [facility]0213-01    Assessment     Is the patient being admitted for a COPD or Asthma exacerbation? No   (If yes the patient will be seen every 4 hours for the first 24 hours and then reassessed)    Patient Admission Diagnosis      Allergies  No Known Allergies     Minimum Predicted Vital Capacity:        Actual Vital Capacity:             Pulmonary History:No history  Home Oxygen Therapy:  room air  Home Respiratory Therapy:Albuterol/Ipratropium Bromide MDI   Current Respiratory Therapy:  Duo TID  Treatment Type: HHN  Medications: Albuterol/Ipratropium    Respiratory Severity Index(RSI)   Patients with orders for inhalation medications, oxygen, or any therapeutic treatment modality will be placed on Respiratory Protocol. They will be assessed with the first treatment and at least every 72 hours thereafter. The following severity scale will be used to determine frequency of treatment intervention.        Smoking History: No Smoking History = 0    Social History  Social History     Tobacco Use    Smoking status: Never Smoker    Smokeless tobacco: Never Used   Substance Use Topics    Alcohol use: No    Drug use: No       Recent Surgical History: Lower Abdominal = 2  Past Surgical History  Past Surgical History:   Procedure Laterality Date    COLONOSCOPY N/A 2020    COLONOSCOPY WITH BIOPSY performed by Isabel Ortez MD at [address] [place] [address] 2020    SIGMOID COLECTOMY, COLOSTOMY, RIGHT COLECTOMY performed by Arturo Loya MD at [address] [place] [address]  2020 Dr Odelia Severe did a SIGMOID COLECTOMY, COLOSTOMY, RIGHT COLECTOMY WITH EN BLOC RESECTION OF MECKEL'S DIVERTICULUM    TURP  2019    TURP N/A 2019    CYSTOSCOPY TRANSURETHRAL RESECTION PROSTATE, CYSTOLITHOPAXY performed by Soraida Purvis MD at [facility] N/A 7/29/2020    EGD BIOPSY performed by Kyler Pierce MD at [facility]       Level of Consciousness: Alert, Oriented, and Cooperative = 0    Level of Activity: Mostly sedentary, minimal walking = 2    Respiratory Pattern: Regular Pattern; RR 8-20 = 0    Breath Sounds: Diminshed bilaterally and/or crackles = 2    Sputum   ,  ,    Cough: Strong, spontaneous, non-productive = 0    Vital Signs   /86   Pulse 57   Temp 97 °F (36.1 °C) (Oral)   Resp 18   Ht 5' 6\" (1.676 m)   Wt 150 lb (68 kg)   SpO2 99%   BMI 24.21 kg/m²   SPO2 (COPD values may differ): 90-91% on room air or greater than 92% on FiO2 24- 28% = 1    Peak Flow (asthma only): not applicable = 0    RSI: 7-8 = BID and Q4HPRN (every four hours as needed) for dyspnea        Plan       Goals: increase oxygenation, medication delivery, mobilize retained secretions, volume expansion and improve oxygenation    Patient/caregiver was educated on the proper method of use for Respiratory Care Devices:  Yes      Level of patient/caregiver understanding able to:   ? Verbalize understanding   ? Demonstrate understanding       ? Teach back        ? Needs reinforcement       ? No available caregiver               ? Other:     Response to education:  Good     Is patient being placed on Home Treatment Regimen? No     Does the patient have everything they need prior to discharge? Yes     Comments: patient assessed chart reviewed    Plan of Care: Duoneb BID and PRN    Electronically signed by Rhea Patel RCP on 8/24/2020 at 12:10 PM    Respiratory Protocol Guidelines     1. Assessment and treatment by Respiratory Therapy will be initiated for medication and therapeutic interventions upon initiation of aerosolized medication. 2. Physician will be contacted for respiratory rate (RR) greater than 35 breaths per minute.  Therapy will be held for heart rate (HR) greater than 140 beats per minute, pending direction from physician. 3. Bronchodilators will be administered via Metered Dose Inhaler (MDI) with spacer when the following criteria are met:  a. Alert and cooperative     b. HR < 140 bpm  c. RR < 30 bpm                d. Can demonstrate a 2-3 second inspiratory hold  4. Bronchodilators will be administered via Hand Held Nebulizer LEIDY [facility]) to patients when ANY of the following criteria are met  a. Incognizant or uncooperative          b. Patients treated with HHN at Home        c. Unable to demonstrate proper use of MDI with spacer     d. RR > 30 bpm   5. Bronchodilators will be delivered via Metered Dose Inhaler (MDI), HHN, Aerogen to intubated patients on mechanical ventilation. 6. Inhalation medication orders will be delivered and/or substituted as outlined below. Aerosolized Medications Ordering and Administration Guidelines:    1. All Medications will be ordered by a physician, and their frequency and/or modality will be adjusted as defined by the patients Respiratory Severity Index (RSI) score. 2. If the patient does not have documented COPD, consider discontinuing anticholinergics when RSI is less than 9.  3. If the bronchospasm worsens (increased RSI), then the bronchodilator frequency can be increased to a maximum of every 4 hours. If greater than every 4 hours is required, the physician will be contacted. 4. If the bronchospasm improves, the frequency of the bronchodilator can be decreased, based on the patient's RSI, but not less than home treatment regimen frequency. 5. Bronchodilator(s) will be discontinued if patient has a RSI less than 9 and has received no scheduled or as needed treatment for 72  Hrs. Patients Ordered on a Mucolytic Agent:    1. Must always be administered with a bronchodilator.     2. Discontinue if patient experiences worsened bronchospasm, or secretions have lessened to the point that the patient is able to clear them with a cough.    Anti-inflammatory and Combination Medications:    1. If the patient lacks prior history of lung disease, is not using inhaled anti-inflammatory medication at home, and lacks wheezing by examination or by history for at least 24 hours, contact physician for possible discontinuation.

## 2020-08-25 NOTE — PROGRESS NOTES
Ems here to transport pt via stretcher. Report given to ems personnel. All personal beongings and packet sent with pt.

## 2020-08-25 NOTE — PROGRESS NOTES
Pt resting with eyes closed, respirs witnessed as e/e, no signs of distress. SR up x2, bed in lowest  position, wheels locked,bed alarm on, Call light and bedside table in easy reach.    Alba Cameron RN

## 2020-08-25 NOTE — CARE COORDINATION
Readmission Assessment  Number of Days since last admission?: 1-7 days  Previous Disposition: Home with Family  Who is being Interviewed: (chart)  What was the patient's/caregiver's perception as to why they think they needed to return back to the hospital?: Other (Comment)(original admit for hypoglycemia -- had scheduled surgery for colectomy - return for surgery)  Did you visit your Primary Care Physician after you left the hospital, before you returned this time?: No  Why weren't you able to visit your PCP?: Did not have an appointment  Did you see a specialist, such as Cardiac, Pulmonary, Orthopedic Physician, etc. after you left the hospital?: No  Who advised the patient to return to the hospital?: Other (Comment)(scheduled surgery)  Does the patient report anything that got in the way of taking their medications?: No     Plan for discharge to [facility] AND [facility] for short stay skilled

## 2020-08-25 NOTE — CARE COORDINATION
Per General Surgery, the Pt is ready for dc today. Called and spoke to admissions with [facility] and they are able to accept with negative rapid Covid test. Per Nyla Hardin in the lab she will send a rapid test to the floor. Nursing aware and will swab Pt.

## 2020-08-25 NOTE — ACP (ADVANCE CARE PLANNING)
referral for advance care planning. Chart reviewed an face-to-face patient visit. Patient is currently unable to communicate his wishes or have a meaningful conversation about documents. He has babbling incomprehensible speech. Currently, he is unable to complete advance directives as only the patient can do this. If he improves and desires to more information please call spiritual care. Spouse is currently the proxy decision-making per [place] law if there is no executed health care power of .   PRN follow-up

## 2020-08-25 NOTE — DISCHARGE SUMMARY
Surgery Discharge Summary    Patient Identification  Nilay Padron is a 80 y.o. male. :  1924  Admit Date:  2020    Discharge date:   No discharge date for patient encounter. Disposition: SNF    Discharge Diagnoses: Active Problems:    Colon cancer ([address]  Resolved Problems:    * No resolved hospital problems. *      Discharge condition: good    Discharge Medications:     Current Discharge Medication List      CONTINUE these medications which have NOT CHANGED    Details   isosorbide dinitrate (ISORDIL) 10 MG tablet TAKE 1 TABLET BY MOUTH 3 TIMES A DAY  Qty: 90 tablet, Refills: 0      rOPINIRole (REQUIP) 1 MG tablet TAKE ONE TABLET AT BEDTIME  Qty: 30 tablet, Refills: 0      ipratropium (ATROVENT HFA) 17 MCG/ACT inhaler Inhale 2 puffs into the lungs every 4 hours as needed for Wheezing  Qty: 1 Inhaler, Refills: 3      albuterol sulfate  (90 Base) MCG/ACT inhaler Inhale 2 puffs into the lungs every 4 hours as needed for Wheezing  Qty: 1 Inhaler, Refills: 3      magnesium hydroxide (MILK OF MAGNESIA) 400 MG/5ML suspension Take 30 mLs by mouth daily as needed for Constipation  Qty: 355 mL, Refills: 0      Misc. Devices ([address]) MISC Use for ambulation  Qty: 1 each, Refills: 0                Current Discharge Medication List            Most Recent Labs:    CBC: No results for input(s): WBC, HGB, HCT, PLT in the last 72 hours. BMP:  No results for input(s): NA, K, CL, CO2, BUN, CREATININE, GLUCOSE in the last 72 hours. Hepatic: No results for input(s): AST, ALT, ALB, BILITOT, ALKPHOS in the last 72 hours. PT/INR:  No results for input(s): INR in the last 72 hours. Consults: none    Surgery: Sigmoid colectomy with colostomy and right colectomy    Patient Instructions: Activity: no heavy lifting, pushing, pulling for 6 weeks, no driving for 2 weeks or while on analgesics  Diet: As tolerated  Follow-up with Dr. Linda Chew in 2 weeks.     The patient and/or family/patient representatives, were provided education regarding discharge instructions, ongoing treatment and follow-up. Details of information given to the patient may be found in the discharge instructions located in the EMR. HPI and Hospital Course:   Patient was admitted after sigmoid colectomy with colostomy as well as right colectomy for cancer. He had an uneventful postoperative course. Due to his underlying age and debility, it was felt he needed rehab prior to return home. He is here for being discharged to a skilled nursing facility on postop day #4.       [facility]

## 2020-08-25 NOTE — PROGRESS NOTES
Shift assessment complete - See flow sheet. Nightly medications given - See [facility]       Patient with no complaints of pain at this time. Patient denies any further needs. Bed alarm is set for patient safety.  Call light explained and in reach

## 2020-08-25 NOTE — PROGRESS NOTES
Apt c/o abdominal pain. Family here for training with the wocn r/t ostomy care. 1300 pt sleeping in bed.

## 2020-08-25 NOTE — PROGRESS NOTES
Inpatient Physical Therapy Daily Treatment Note    Unit: [address]  Date:  8/25/2020  Patient Name:    Fabienne Parrish  Admitting diagnosis:  Colon cancer [facility]) [C18.9]  Admit Date:  8/21/2020  Precautions/Restrictions:Fall risk, Bed/chair alarm, Lines -IV, Supplemental O2 (2.5), colostomy bag, Sharif catheter and lower midline abdominal incision, [place] (hard of hearing), Telemetry and Telesitter  S/P SIGMOID COLECTOMY, COLOSTOMY (8/21/2020)      Discharge Recommendations: SNF  DME needs for discharge: defer to facility       Therapy recommendation for EMS Transport: can transport by wheelchair    Therapy recommendations for staff:   Assist of 1 with use of rolling walker (RW) and gait belt for all transfers and ambulation to/from chair    History of Present Illness: Patient is 80 yrs old male who underwent Sigmoid Colectomy, colostomy on 08/21/2020 for 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 Home Health S4 Level Recommendation: NA  AM-PAC Mobility Score   AM-PAC Inpatient Mobility Raw Score : 17     Treatment Time: 5524 - 4411  Treatment number: 2  Timed Code Treatment Minutes: 35 minutes  Total Treatment Minutes:  35  minutes    Cognition    A&O Person and Place   Able to follow 1 step commands    Subjective  Patient lying supine in bed with no family present   Pt agreeable to this PT tx.      Pain   No  Location: N/A  Rating:    NA/10  Pain Medicine Status: Denies need     Bed Mobility   Supine to Sit:    Min A   Sit to Supine:   Not Tested  Rolling:   Not Tested  Scooting:   Min A  and 2 persons    Transfer Training     Sit to stand:   CGA  Stand to sit:   CGA  Bed to Chair:   CGA with use of gait belt and rolling walker (RW)    Gait Training gait completed as indicated below  Distance:      8 ft  Deviations (firm surface/linoleum):  decreased katy, increased MARCK, forward flexed posture, decreased step length bilaterally and decreased stance time bilaterally  Assistive Device Used:    gait belt and rolling walker (RW)  Level of Assist:    CGA  Comment: Patient showed improved balance today with RW with good sequencing with AD. Stair Training deferred, pt unsafe/not appropriate to complete stairs at this time  # of Steps:   N/A  Level of Assist:  Not Tested  UE Support:  NA  Assistive Device:  N/A  Pattern:   N/A  Comments:     Therapeutic Exercise all completed bilaterally unless indicated  Ankle Pumps x 20 reps  LAQ x 20 reps  marching x 20 reps  Hip abduction: x 20 reps  Hip adduction/pillow squeeze: x 20 reps  Heel and toe raises in sitting for 20 reps. Balance  Sitting:  Fair +; SBA  Comments:     Standing: Fair -; CGA  Comments: 5 min    Patient Education      Role of PT, POC, Discharge recommendations, DC recommendations, safety awareness, transfer techniques, pursed lip breathing, energy conservation, pacing activity and calling for assist with mobility. Positioning Needs       Pt up in chair, alarm set, positioned in proper neutral alignment and pressure relief provided. Call light provided and all needs within reach    ROM Measurements N/A  Knee Flexion:  Knee Extension:     Activity Tolerance   Pt completed therapy session with SOB noted with activity  Spo2 = 98%  HR = 101 BPM.  BP = 116/77    Other  N/A    Assessment :  Patient tolerated the session well today with improved standing tolerance and balance with normal vital parameters response. Recommending SNF upon discharge as patient functioning well below baseline, demonstrates good rehab potential and unable to return home due to limited or no family support, burden of care beyond caregiver ability, home environment not conducive to patient recovery and limited safety awareness. Goals (all goals ongoing unless otherwise indicated)  To be met in 3 visits:  1). Independent with LE Ex x 10 reps     To be met in 6 visits:  1). Supine to/from sit: CGA  2). Sit to/from stand: CGA  3). Bed to chair: CGA  4).   Gait: Ambulate 40 ft.

## 2020-08-25 NOTE — DISCHARGE INSTR - COC
performed by Ewa Dean MD at [address] [facility]       Immunization History:   Immunization History   Administered Date(s) Administered    Influenza 10/16/2012, 09/26/2013    Influenza Vaccine, unspecified formulation 10/16/2012, 09/26/2013    Influenza Virus Vaccine 10/28/2014, 11/30/2015    Influenza Whole 11/24/2008    Influenza, Quadv, IM, PF (6 mo and older Fluzone, Flulaval, Fluarix, and 3 yrs and older Afluria) 01/21/2020    Pneumococcal Conjugate 13-valent (Pojophy49) 11/30/2015    Pneumococcal Polysaccharide (Putgpwaqg14) 05/17/2017    Varicella (Varivax) 05/21/2008       Active Problems:  Patient Active Problem List   Diagnosis Code    Hypertension I10    Benign prostatic hyperplasia N40.0    CAD (coronary artery disease) I25.10    Type 2 diabetes mellitus without complication ([facility] M33.9    Osteoarthrosis M19.90    Restless legs syndrome G25.81    Renal failure N19    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.5    Hyperlipidemia associated with type 2 diabetes mellitus (HCC) E11.69, E78.5    Chronic kidney disease (CKD), stage III (moderate) ([facility]) N18.3    Primary osteoarthritis of both knees M17.0    Elevated LFTs R94.5    BPH with urinary obstruction N40.1, N13.8    Adenocarcinoma of prostate ([facility] C61    Acute cystitis without hematuria N30.00    Hypophosphatemia E83.39    Generalized weakness R53.1    Severe protein-calorie malnutrition (HCC) E43    Constipation K59.00    Community acquired pneumonia J18.9    New onset a-fib (HCC) I48.91    Iron deficiency anemia D50.9    History of atherosclerotic heart disease Z86.79    SAN (dyspnea on exertion) R06.09    Acute anemia D64.9    Pleural effusion J90    Malignant neoplasm of colon (HCC) C18.9    Colon cancer (HCC) C18.9       Isolation/Infection:   Isolation          No Isolation        Patient Infection Status     Infection Onset Added Last Indicated Last Indicated By Review Planned Expiration Resolved Resolved By    COVID-19 Rule Out 08/25/20 08/25/20 08/25/20 COVID-19 (Ordered) 09/01/20 09/08/20      Resolved    COVID-19 Rule Out 08/20/20 08/20/20 08/20/20 COVID-19 (Ordered)   08/20/20 Rule-Out Test Resulted    COVID-19 08/17/20 08/19/20 08/17/20 COVID-19   08/20/20 Ruddy JA Mcclellan    TEST INVALID. NOT A TRUE POSITIVE.     COVID-19 Rule Out 08/17/20 08/17/20 08/17/20 COVID-19 (Ordered)   08/19/20 Rule-Out Test Resulted    COVID-19 Rule Out 07/24/20 07/24/20 07/24/20 COVID-19 (Ordered)   07/26/20 Rule-Out Test Resulted          Nurse Assessment:  Last Vital Signs: /85   Pulse 100   Temp 96.7 °F (35.9 °C) (Oral)   Resp 24   Ht 5' 6\" (1.676 m)   Wt 150 lb (68 kg)   SpO2 93%   BMI 24.21 kg/m²     Last documented pain score (0-10 scale): Pain Level: 0  Last Weight:   Wt Readings from Last 1 Encounters:   08/21/20 150 lb (68 kg)     Mental Status:  alert and oriented to person and place    IV Access:  - None    Nursing Mobility/ADLs:  Walking   {CHP DME PHSA:265936999}  Transfer  {CHP DME BRSS:600047127}  Bathing  {CHP DME MRWQ:726382158}  Dressing  {CHP DME FHPR:280628695}  Toileting  {CHP DME KTEI:796472482}  Feeding  {CHP DME REIL:619307314}  Med Admin  {CHP DME XUPE:532627848}  Med Delivery   {[facility] MED Delivery:807832822}    Wound Care Documentation and Therapy:        Elimination:  Continence:   · Bowel: {YES / OZ:56589}  · Bladder: {YES / DY:34832}  Urinary Catheter: {Urinary Catheter:364250046}   Colostomy/Ileostomy/Ileal Conduit: {YES / SA:10761}  Colostomy-Stomal Appliance: 1 piece  Colostomy-Stoma  Assessment: Pink, Moist  Colostomy-Mucocutaneous Junction: Intact  Colostomy-Peristomal Assessment: Clean, Intact  Colostomy-Treatment: Bag change, Liquid skin barrier  Colostomy-Stool Appearance: Watery  Colostomy-Stool Color: Brown  Colostomy-Stool Amount: Medium  Colostomy-Output (mL): 200 ml    Date of Last BM: ***    Intake/Output Summary (Last 24 hours) at 8/25/2020 1119  Last data filed at 2020 0957  Gross per 24 hour   Intake 3267 ml   Output 575 ml   Net 2692 ml     I/O last 3 completed shifts:   In: 3267 [P.O.:480; I.V.:2787]  Out: 375 [Urine:250; Stool:125]    Safety Concerns:     [address] Mob[address] Safety Concerns:017887097}    Impairments/Disabilities:      [address] Mob[address] Impairments/Disabilities:670485167}    Nutrition Therapy:  Current Nutrition Therapy:   [address] Mob[address] Diet List:003882829}    Routes of Feeding: {CHP DME Other Feedings:859336176}  Liquids: {Slp liquid thickness:28862}  Daily Fluid Restriction: {CHP DME Yes amt example:010685023}  Last Modified Barium Swallow with Video (Video Swallowing Test): {Done Not Done CLFQ:962972166}    Treatments at the Time of Hospital Discharge:   Respiratory Treatments: ***  Oxygen Therapy:  {Therapy; copd oxygen:33211}  Ventilator:    { CC Vent KKXZ:002403384}    Rehab Therapies: {THERAPEUTIC INTERVENTION:2908046667}  Weight Bearing Status/Restrictions: [address] [place]  Weight Bearin}  Other Medical Equipment (for information only, NOT a DME order):  {EQUIPMENT:304386526}  Other Treatments: ***    Patient's personal belongings (please select all that are sent with patient):  {[facility] DME Belongings:175172078}    RN SIGNATURE:  {Esignature:915609174}    CASE MANAGEMENT/SOCIAL WORK SECTION    Inpatient Status Date: 20    Readmission Risk Assessment Score:  Readmission Risk              Risk of Unplanned Readmission:        20           Discharging to Facility/ Agency   · Name: Deborah Nogueira   · Phone: [phone]  · Fax:[phone]    / signature: Electronically signed by Sreekanth Harrison RN on 20 at 12:29 PM EDT    PHYSICIAN SECTION    Prognosis: Good    Condition at Discharge: Stable    Rehab Potential (if transferring to Rehab): Good    Recommended Labs or Other Treatments After Discharge: ostomy care    Physician Certification: I certify the above information and transfer of Bong Tameztering  is necessary for the continuing treatment of the diagnosis listed and that he requires [place] for less 30 days.      Update Admission H&P: No change in H&P    PHYSICIAN SIGNATURE:  Electronically signed by Alton Martinez MD on 8/25/20 at 11:19 AM EDT

## 2020-08-25 NOTE — CONSULTS
Pt seen for ostomy care. Pt is recent post op today per Dr Lindi Jeans for  Sigmoid colectomy with creation of end colostomy, right colectomy with en bloc resection of Meckel's diverticulum. Pt very drowsy. Current pouch intact, stoma red, moist and budded through pouch. No bowel sweat in pouch. Will follow on Monday. Per staff RN, pt is cared for at home by daughter. Will attempt to contact.   Time spent 10 minutes
Pt seen for ostomy teaching with daughter, Awa Yin  in law and granddaughter. They care for him at home. Pt up in chair. Supplies gathered. Daughter able to remove old wafer, prepare new wafer, verbalizes resizing stoma, and apply new wafer with verbal cues only. Discussed closed ended pouches, soaps, clothing, diet and suppliers. Pt has supplies in room. Pt going to short term rehab prior to going home. Family has [facility] contact info. Encouraged to call for issues or questions when home. Will leave Ostomy ordering info  and typed instructions with patient prior to discharge. Time spent 45 minutes      Tori Colette  Colostomy Care Instructions  Gather supplies listed below:  Scissors Throw away bag   [employer] Flex cut to fit wafer, stock #: [id number]   Pattern or measuring guide Coloplast Flex drainable pouch, stock #: [id number]      1. Using pattern provided, cut wafer to appropriate size. Remember to remeasure stoma for the first 4-6 weeks after surgery and adjust size of hole as needed. 2.  Remove old appliance and place in disposable bag, throw away. 3. Cleanse skin around stoma using plain water and a washcloth.  If you choose to use soap, use Dial or Ivory and rinse all residue off skin.  Pat dry. 4. Remove backing on wafer apply over stoma to skin. Rub finger around stoma on wafer to help seal.    5.  Remove backing on pouch.  Line up adhesive to landing zone on wafer and attach.    6. Hold hand over wafer x 5 minutes to assist with seal.          Call Ostomy Nurse at [facility] for questions or problems  Deonna Aaruz [phone]
[phone]

## 2020-08-25 NOTE — PROGRESS NOTES
N/A    Patient Education:   Role of OT  Recommendations for DC    Positioning Needs:   Up in chair, call light and needs in reach. Alarm Set    Family Present:  No    Assessment: Patient demonstrated improved activity tolerance and participation in ADLs and mobility. Patient would benefit from continued skilled OT services. GOALS  To be met in 3 Visits:  1). Bed to toilet/BSC: Min A     To be met in 5 Visits:  1). Supine to/from Sit:             SBA  2). Upper Body Bathing:         SBA  3). Lower Body Bathing:         Min A  4). Upper Body Dressing:       SBA  5). Lower Body Dressing:       Min A  6).  Pt to demonstrate UE exs x 15 reps with minimal cues    Plan: cont with [facility], OTR/L #[id number]      If patient discharges from this facility prior to next visit, this note will serve as the Discharge Summary

## 2020-08-25 NOTE — PROGRESS NOTES
Lab called stating that 2 nurses tried to get blood from pt, but were both unsuccessful. Clinical made aware.

## 2020-08-27 NOTE — ED NOTES
3mcg epinephrine given per verbal order with readback from Dr. Roberta Puente for BP 58/36     Kwesi Hatch, RN  08/27/20 [phone] [id number]

## 2020-08-27 NOTE — ED NOTES
Levophed titrated every 3-5 minutes according to blood pressures to 15mcg/min at time of death. RN present at bedside entire time. MD at bedside, states to stop levophed per family wishes.       Deirdre Ann RN  08/27/20 5693

## 2020-08-27 NOTE — ED NOTES
Letty Babinski from [facility] office called and given information.      Manuel Silveira, EMT-P  08/27/20 3969

## 2020-08-27 NOTE — ED NOTES
Levophed started at 5mcg/min per verbal order with readback from Dr. Radha Reyes.       Concha Valdes, RN  08/27/20 2696

## 2020-08-27 NOTE — ED NOTES
1350: Preoxygenation ventilations started by [facility]. 100mg succinylcholine IVP once and 5mg IVP versed once verbal order with readback from Dr. Sukhwinder Hendricks. 1351: 5mg IVP versed given by Cinthya Castrejon RN  1351: 100mg succinylcholine IVP given by ELLIE Mota RN  5351: Pt intubated by Dr. Sukhwinder Hendricks 7.5 ETT, secured at the 25cm lip line.         Kathy Cuevas RN  08/27/20 1678

## 2020-08-27 NOTE — ED NOTES
Rob , [facility] called. Will have someone here shortly.      Annika Hinton, EMT-P  08/27/20 9542

## 2020-08-27 NOTE — ED PROVIDER NOTES
[address] ENCOUNTER      Pt Name: Oumar Webster  MRN: [id number]  [place] 9/6/1924  Date of evaluation: 8/27/2020  Provider: Олег Rodas MD    [address] [place] [address]       Chief Complaint   Patient presents with    Altered Mental Status         HISTORY OF PRESENT ILLNESS   (Location/Symptom, Timing/Onset, Context/Setting, Quality, Duration, Modifying Factors, Severity)  Note limiting factors. Oumar Webster is a 80 y.o. male who presents to the emergency department     This patient apparently less than a week ago underwent a partial colectomy apparently for colon cancer apparently was a adenocarcinoma of the colon  It was resected by Dr. Meron Santoyo  Patient has been followed medically by Dr. Sola Dorman  The patient does have a history of type 2 diabetes without complaints history of prostate cancer history of chronic atrial fib  History of some mild chronic kidney disease hypertensive disease history of possible UTI he presented I guess on August 18 with a small bowel obstruction surgery was performed. Prognosis was guarded due to his age but the family felt that he was very strong and could survive. Patient did go to the extended care facility. Patient presents here this morning this unresponsive with also hypotension noted. Patient was immediately taken into the room he seemed to be maintaining his airway when he first arrived but it was very marginal I decided to counter watch him because of his age I immediately called the daughter-in-law who felt that everything should be done. We did talk about that.   Patient was seen to have atrial fibrillation on the monitor  It was noted his blood pressure when he first got here was very soft to begin with after short time I realized that he was deteriorating therefore I did like to do endotracheal intubation we did give him some fluids we also hyperventilated him with high flow oxygen through bag valve mask system  The patient then was intubated with 100 mg of succinylcholine and 5 of Versed 7.5 ET tube was placed. It was at 24 cm. Patient initially had good breath sounds for a while we did have some decreased breath sounds on the left side but we did pull it back. This was confirmed by the radiologist we did a pullback and did hear good breath sounds bilaterally  I did show that he had a work-up also since he was hypotensive I did put a central line in him. Patient underwent placement of a right femoral central line      The history is provided by the EMS personnel, medical records and a relative. Nursing Notes were reviewed. REVIEW OF SYSTEMS    (2-9 systems for level 4, 10 or more for level 5)     Review of Systems   Reason unable to perform ROS: Review of systems could not be obtained because of the patient's unconscious state. Constitutional: Positive for activity change. Negative for fever. Eyes: Negative for redness. Gastrointestinal: Positive for abdominal pain. Neurological: Negative for headaches. All other systems reviewed and are negative. Except as noted above the remainder of the review of systems was reviewed and negative.        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    Osteoarthrosis     Renal failure     Restless legs syndrome     SBO (small bowel obstruction) ([address] 01/18/2020    SBO vs ileus    Type II or unspecified type diabetes mellitus without mention of complication, not stated as uncontrolled          SURGICAL HISTORY       Past Surgical History:   Procedure Laterality Date    COLONOSCOPY N/A 7/29/2020    COLONOSCOPY WITH BIOPSY performed by Ewa Dean MD at [facility] N/A 8/21/2020    SIGMOID COLECTOMY, COLOSTOMY, RIGHT COLECTOMY performed by Dexter Faulkner MD at [facility]  08/21/2020 8/21/20 Dr Chiki Clark did a SIGMOID COLECTOMY, COLOSTOMY, RIGHT COLECTOMY WITH EN BLOC RESECTION OF MECKEL'S DIVERTICULUM    TURP  08/14/2019    TURP N/A 8/14/2019    CYSTOSCOPY TRANSURETHRAL RESECTION PROSTATE, CYSTOLITHOPAXY performed by [place] 7/29/2020    EGD BIOPSY performed by Ewa Dean MD at [phone]       Previous Medications    ALBUTEROL SULFATE  (90 BASE) MCG/ACT INHALER    Inhale 2 puffs into the lungs every 4 hours as needed for Wheezing    IPRATROPIUM (ATROVENT HFA) 17 MCG/ACT INHALER    Inhale 2 puffs into the lungs every 4 hours as needed for Wheezing    ISOSORBIDE DINITRATE (ISORDIL) 10 MG TABLET    TAKE 1 TABLET BY MOUTH 3 TIMES A DAY    MAGNESIUM HYDROXIDE (MILK OF MAGNESIA) 400 MG/5ML SUSPENSION    Take 30 mLs by mouth daily as needed for Constipation    MISC. DEVICES ([facility] [employer] [facility]) MISC    Use for ambulation    ROPINIROLE (REQUIP) 1 MG TABLET    TAKE ONE TABLET AT BEDTIME       ALLERGIES     Patient has no known allergies.     FAMILY HISTORY       Family History   Problem Relation Age of Onset    Cancer Mother         Bone CA    Stroke Father           SOCIAL HISTORY       Social History     Socioeconomic History    Marital status:      Spouse name: None    Number of children: None    Years of education: None    Highest education level: None   Occupational History    None   Social Needs    Financial resource strain: None    Food insecurity     Worry: None     Inability: None    Transportation needs Medical: None     Non-medical: None   Tobacco Use    Smoking status: Never Smoker    Smokeless tobacco: Never Used   Substance and Sexual Activity    Alcohol use: No    Drug use: No    Sexual activity: Not Currently     Partners: Female   Lifestyle    Physical activity     Days per week: None     Minutes per session: None    Stress: None   Relationships    Social connections     Talks on phone: None     Gets together: None     Attends Islam service: None     Active member of club or organization: None     Attends meetings of clubs or organizations: None     Relationship status: None    Intimate partner violence     Fear of current or ex partner: None     Emotionally abused: None     Physically abused: None     Forced sexual activity: None   Other Topics Concern    None   Social History Narrative    None       SCREENINGS               PHYSICAL EXAM    (up to 7 for level 4, 8 or more for level 5)     ED Triage Vitals   BP Temp Temp Source Pulse Resp SpO2 Height Weight   08/27/20 1313 08/27/20 1415 08/27/20 1415 08/27/20 1313 08/27/20 1313 08/27/20 1313 -- --   (!) 85/23 96.4 °F (35.8 °C) Rectal 80 24 97 %         Physical Exam  Vitals signs and nursing note reviewed. Constitutional:       General: He is in acute distress. Appearance: He is ill-appearing and toxic-appearing. HENT:      Head: Normocephalic and atraumatic. Right Ear: Tympanic membrane and ear canal normal.      Left Ear: Tympanic membrane and ear canal normal.      Nose: Nose normal.      Mouth/Throat:      Mouth: Mucous membranes are moist.   Eyes:      Extraocular Movements: Extraocular movements intact. Pupils: Pupils are equal, round, and reactive to light. Neck:      Musculoskeletal: No neck rigidity or muscular tenderness. Cardiovascular:      Rate and Rhythm: Rhythm irregular. Pulmonary:      Effort: Pulmonary effort is normal.      Breath sounds: Normal breath sounds.    Abdominal:      General: Abdomen is flat. Bowel sounds are absent. Palpations: Abdomen is soft. Neurological:      Mental Status: He is unresponsive. GCS: GCS eye subscore is 1. GCS verbal subscore is 1. GCS motor subscore is 3. Comments: Patient is unresponsive his pupils are mid position and mandible minimally reactive they are not constricted nor are they dilated  They are also not unequal         DIAGNOSTIC RESULTS     EKG: All EKG's are interpreted by the Emergency Department Physician who either signs or Co-signs this chart in the absence of a cardiologist.        RADIOLOGY:   Non-plain film images such as CT, Ultrasound and MRI are read by the radiologist. Plain radiographic images are visualized and preliminarily interpreted by the emergency physician with the below findings:        Interpretation per the Radiologist below, if available at the time of this note:    XR CHEST PORTABLE   Final Result   1. Bilateral pleural effusions   2.  Endotracheal tube approximately 11 mm above the milla, consider   withdrawing several cm                 LABS:  Results for orders placed or performed during the hospital encounter of 08/27/20   CBC Auto Differential   Result Value Ref Range    WBC 17.3 (H) 4.0 - 11.0 K/uL    RBC 4.41 4.20 - 5.90 M/uL    Hemoglobin 8.0 (L) 13.5 - 17.5 g/dL    Hematocrit 30.6 (L) 40.5 - 52.5 %    MCV 69.4 (L) 80.0 - 100.0 fL    MCH 18.1 (L) 26.0 - 34.0 pg    MCHC 26.1 (L) 31.0 - 36.0 g/dL    RDW 20.1 (H) 12.4 - 15.4 %    Platelets 968 967 - 072 K/uL    MPV 8.2 5.0 - 10.5 fL   Comprehensive Metabolic Panel   Result Value Ref Range    Sodium 138 136 - 145 mmol/L    Potassium 6.2 (HH) 3.5 - 5.1 mmol/L    Chloride 104 99 - 110 mmol/L    CO2 20 (L) 21 - 32 mmol/L    Anion Gap 14 3 - 16    Glucose 148 (H) 70 - 99 mg/dL    BUN 65 (H) 7 - 20 mg/dL    CREATININE 2.3 (H) 0.8 - 1.3 mg/dL    GFR Non-African American 26 (A) >60    GFR  32 (A) >60    Calcium 8.5 8.3 - 10.6 mg/dL    Total Protein 5.6 (L) 6.4 - 8.2 g/dL    Alb 3.3 (L) 3.4 - 5.0 g/dL    Albumin/Globulin Ratio 1.4 1.1 - 2.2    Total Bilirubin 1.3 (H) 0.0 - 1.0 mg/dL    Alkaline Phosphatase 102 40 - 129 U/L     (H) 10 - 40 U/L     (H) 15 - 37 U/L    Globulin 2.3 g/dL   Protime-INR   Result Value Ref Range    Protime 25.7 (H) 10.0 - 13.2 sec    INR 2.27 (H) 0.86 - 1.14   Brain Natriuretic Peptide   Result Value Ref Range    Pro-BNP 15,803 (H) 0 - 449 pg/mL   Lipase   Result Value Ref Range    Lipase 9.0 (L) 13.0 - 60.0 U/L   Lactic Acid, Plasma   Result Value Ref Range    Lactic Acid 4.2 (HH) 0.4 - 2.0 mmol/L   Magnesium   Result Value Ref Range    Magnesium 2.70 (H) 1.80 - 2.40 mg/dL   Blood gas, venous   Result Value Ref Range    pH, Rick 7.250 (L) 7.350 - 7.450    pCO2, Rick 40.1 40.0 - 50.0 mmHg    pO2, Rick 91.0 (H) 25.0 - 40.0 mmHg    HCO3, Venous 17.2 (L) 23.0 - 29.0 mmol/L    Base Excess, Rick -9.4 (L) -3.0 - 3.0 mmol/L    O2 Sat, Rick 96 Not Established %    Carboxyhemoglobin 10.5 (H) 0.0 - 1.5 %    MetHgb, Rick 0.3 <1.5 %    TC02 (Calc), Rick 18 Not Established mmol/L    O2 Content, Rick 12 Not Established VOL %    O2 Therapy Unknown    POCT Glucose   Result Value Ref Range    POC Glucose 143 (H) 70 - 99 mg/dl    Performed on ACCU-CHEK    EKG 12 Lead   Result Value Ref Range    Ventricular Rate 74 BPM    Atrial Rate 127 BPM    QRS Duration 84 ms    Q-T Interval 412 ms    QTc Calculation (Bazett) 457 ms    R Axis 4 degrees    T Axis 11 degrees    Diagnosis       Atrial fibrillationLow voltage QRSAbnormal ECGNo previous ECGs available            EMERGENCY DEPARTMENT COURSE and DIFFERENTIAL DIAGNOSIS/MDM:     Vitals:    08/27/20 1517 08/27/20 1521 08/27/20 1528 08/27/20 1530   BP: (!) 63/14 87/65 (!) 53/40 (!) 35/16   Pulse: 68 67 (!) 35 (!) 34   Resp: 22 17 20 16   Temp:       TempSrc:       SpO2:  (!) 77%  (!) 78%           MDM      REASSESSMENT          CRITICAL CARE TIME     CONSULTS:  None    Per standard of care we did give him a fluid resuscitation with approximately 2 to 3 L of fluids be given for his hypotension unfortunately however he did remain hypotensive we did give appendectomy bolus. We then also then subsequently gave more appendectomy also started him on Levophed. He did not respond to aggressive ACLS modalities we gave epi epinephrine 1 mg, 1 mg of atropine and 1 full amp of sodium bicarbonate I did review his venous blood gases all labs were ordered and reviewed. The patient underwent approximately 45 minutes of critical care time +10 minutes for endotracheal intubation +15 minutes for right central line placement. Please see my procedure notes  EKG ordered and interpreted as atrial fib with low QRS    PROCEDURES:     Intubation    Date/Time: 8/27/2020 4:06 PM  Performed by: Kwesi Shrestha MD  Authorized by: Kwesi Shrestha MD     Consent:     Consent obtained:  Emergent situation    Consent given by:  Guardian    Alternatives discussed:  No treatment  Pre-procedure details:     Patient status:  Unresponsive    Mallampati score: I    Pretreatment medications:  Midazolam    Paralytics:  Succinylcholine  Procedure details:     Preoxygenation:  Bag valve mask    CPR in progress: no      Intubation method:  Oral    Oral intubation technique:  Fiber optic    Laryngoscope blade: Mac 4    Tube size (mm):  7.5    Tube type:  Cuffed    Number of attempts:  1    Cricoid pressure: no      Tube visualized through cords: yes    Placement assessment:     ETT to lip:  24    Tube secured with:  ETT martinez    Breath sounds:  Equal    Placement verification: chest rise    Post-procedure details:     Patient tolerance of procedure:   Tolerated well, no immediate complications  Central Line    Date/Time: 8/27/2020 4:08 PM  Performed by: Kwesi Shrestha MD  Authorized by: Kwesi Shrestha MD     Consent:     Consent obtained:  Emergent situation    Consent given by:  Guardian    Alternatives discussed:  No treatment  Pre-procedure details:     Hand hygiene: Hand hygiene performed prior to insertion      Sterile barrier technique: All elements of maximal sterile technique followed      Skin preparation:  2% chlorhexidine    Skin preparation agent: Skin preparation agent completely dried prior to procedure    Sedation:     Sedation type: Moderate (conscious) sedation  Procedure details:     Location:  R femoral    Patient position:  Flat    Procedural supplies:  Triple lumen    Catheter size:  7.5 Fr    Landmarks identified: yes      Ultrasound guidance: yes      Sterile ultrasound techniques: Sterile gel and sterile probe covers were used      Number of attempts:  1    Successful placement: yes    Post-procedure details:     Post-procedure:  Dressing applied and line sutured    Assessment:  Blood return through all ports and free fluid flow    Patient tolerance of procedure: Tolerated well, no immediate complications        MEDICATIONS GIVEN THIS VISIT:  Medications   norepinephrine (LEVOPHED) 1 MG/ML injection (has no administration in time range)   norepinephrine (LEVOPHED) 1 MG/ML injection (has no administration in time range)   dextrose 5 % solution (has no administration in time range)   0.9 % sodium chloride bolus (1,000 mLs Intravenous New Bag 20 1342)        FINAL IMPRESSION      1. Cardiopulmonary arrest [facility])        Dr. Jackson Copemmanuelle her and was contacted who graciously agreed to sign the death certificate    DISPOSITION/PLAN   DISPOSITION  2020 03:57:21 PM    Patient was pronounced at 14 35  PATIENT REFERRED TO:  No follow-up provider specified. DISCHARGE MEDICATIONS:  New Prescriptions    No medications on file       Controlled Substances Monitoring  No flowsheet data found.         (Please note that portions of this note were completed with a voice recognition program.  Efforts were made to edit the dictations but occasionally words are mis-transcribed.)    Patient was advised to return to the Emergency Department if there was any worsening.     Annie Smiley MD (electronically signed)  Attending Emergency Physician         Evelyn Mi MD  09/01/20 MD Melonie  09/22/20 [id number]

## 2020-08-27 NOTE — CODE DOCUMENTATION
Rhythm change noted, wide complex qrs. Family at bedside, Per daughter in [facility], [place], no compressions to done.

## 2020-08-27 NOTE — TELEPHONE ENCOUNTER
Writer contacted  ED provider to inform of 30 day readmission risk. Writer's attempt to contact ED provider was unsuccessful. Pt will be admitted.

## 2020-08-27 NOTE — ED NOTES
1430: Dr. Keely Nj at bedside pressures 57/28. Per verbal order with readback from Dr. Keely Nj, epinephrine 3mcg given.       Andi Carbone RN  08/27/20 [id number]

## 2020-08-31 LAB
BLOOD CULTURE, ROUTINE: NORMAL
CULTURE, BLOOD 2: NORMAL

## (undated) DEVICE — BOWL MED L 32OZ PLAS W/ MOLD GRAD EZ OPN PEEL PCH

## (undated) DEVICE — ELECTRODE PT RET AD L9FT HI MOIST COND ADH HYDRGEL CORDED

## (undated) DEVICE — MEDI-VAC NON-CONDUCTIVE SUCTION TUBING: Brand: CARDINAL HEALTH

## (undated) DEVICE — Z DISCONTINUED USE 2716237 RELOAD STPL L40MM H1.5-3.5MM WIRE DIA0.2MM REG TISS BLU CRV

## (undated) DEVICE — SUTURE PERMAHAND SZ 3-0 L18IN NONABSORBABLE BLK L26MM SH C013D

## (undated) DEVICE — STAPLER INT L75MM CUT LN L73MM STPL LN L77MM BLU B FRM 8

## (undated) DEVICE — RELOAD STPL L75MM OPN H3.8MM CLS 1.5MM WIRE DIA0.2MM REG

## (undated) DEVICE — GLOVE ORANGE PI 7 1/2   MSG9075

## (undated) DEVICE — FORCEP BX STD CAP 240CM RAD JAW 4

## (undated) DEVICE — ENDO CARRY-ON PROCEDURE KIT INCLUDES SUCTION TUBING, LUBRICANT, GAUZE, BIOHAZARD STICKER, TRANSPORT PAD AND INTERCEPT BEDSIDE KIT.: Brand: ENDO CARRY-ON PROCEDURE KIT

## (undated) DEVICE — BAG URIN STRL FOR URO CTCH SYS

## (undated) DEVICE — PREP SOL PVP IODINE 4%  4 OZ/BTL

## (undated) DEVICE — Y-TYPE TUR/BLADDER IRRIGATION SET, REGULATING CLAMP

## (undated) DEVICE — SHEATH INTRO 17GA L8IN TUNN DISP ON-Q

## (undated) DEVICE — SUTURE PERMA-HAND SZ 2-0 L30IN NONABSORBABLE BLK L26MM SH K833H

## (undated) DEVICE — SPONGE LAP W18XL18IN WHT COT 4 PLY FLD STRUNG RADPQ DISP ST

## (undated) DEVICE — SYRINGE 60ML IRRIG PISTON TOMEY

## (undated) DEVICE — CIRCUIT ANES L72IN 3L BACT AND VIR FLTR EL CONN SGL LIMB

## (undated) DEVICE — SUTURE PERMAHAND SZ 2-0 L18IN NONABSORBABLE BLK L26MM SH C012D

## (undated) DEVICE — CATHETER URETH 22FR BLLN 30CC 3 W F SPEC INF CTRL BARDX

## (undated) DEVICE — APPLICATOR PREP 26ML 0.7% IOD POVACRYLEX 74% ISO ALC ST

## (undated) DEVICE — ELECTRODE LOOP 24FR R ANG MPLR CUT

## (undated) DEVICE — DBD-PACK,CYSTOSCOPY,PK VI,AURORA: Brand: MEDLINE

## (undated) DEVICE — GAUZE,SPONGE,4"X4",8PLY,STRL,LF,10/TRAY: Brand: MEDLINE

## (undated) DEVICE — Z INACTIVE USE 2635504 SOLUTION IRRIG 3000ML 1.5% GL USP UROMATIC CONT

## (undated) DEVICE — KIT INFUS PMP 270ML 4ML/HR 2ML/SITE SOAK CATH L5IN N NARC

## (undated) DEVICE — ELECTRODE ECG MONITR FOAM TEAR DROP ADLT RED

## (undated) DEVICE — SOLUTION IV IRRIG 500ML 0.9% SODIUM CHL 2F7123

## (undated) DEVICE — POOLE SUCTION INSTRUMENT,RIGID: Brand: ARGYLE

## (undated) DEVICE — SEALER ENDOSCP NANO COAT OPN DIV CRV L JAW LIGASURE IMPACT

## (undated) DEVICE — BAG DRNGE 2000ML UROLOGY ANTI REFLX CHMBR SAMP PRT

## (undated) DEVICE — CABLE ENDOSCP L10FT ACT DISP

## (undated) DEVICE — TRAY CATH 16FR F INCLUDE LUB DRNGE BG STATLOK STBL DEV

## (undated) DEVICE — Z CONVERTED USE 2271043 CONTAINER SPEC COLL 4OZ SCR ON LID PEEL PCH

## (undated) DEVICE — COVER BOOT THK2MIL UNIV POLYETH IMPERMEABLE FLD RESIST DISP

## (undated) DEVICE — CONMED SCOPE SAVER BITE BLOCK, 20X27 MM: Brand: SCOPE SAVER

## (undated) DEVICE — GOWN SIRUS NONREIN XL W/TWL: Brand: MEDLINE INDUSTRIES, INC.

## (undated) DEVICE — ELECTRODE,RADIOTRANSLUCENT,FOAM,3PK: Brand: MEDLINE

## (undated) DEVICE — Z DISCONTINUED USE 2716239 STAPLER INT STPL 51MM CUT LN L40MM STD TISS CRV CUT CR40B

## (undated) DEVICE — MAJOR SET UP PK

## (undated) DEVICE — 35 ML SYRINGE REGULAR TIP: Brand: MONOJECT

## (undated) DEVICE — SUTURE PROL SZ 1 L30IN NONABSORBABLE BLU CTX L48MM 1/2 CIR 8455H